# Patient Record
Sex: FEMALE | Race: WHITE | NOT HISPANIC OR LATINO | Employment: FULL TIME
[De-identification: names, ages, dates, MRNs, and addresses within clinical notes are randomized per-mention and may not be internally consistent; named-entity substitution may affect disease eponyms.]

---

## 2017-04-12 ENCOUNTER — RECORDS - HEALTHEAST (OUTPATIENT)
Dept: ADMINISTRATIVE | Facility: OTHER | Age: 57
End: 2017-04-12

## 2017-04-19 ENCOUNTER — HOSPITAL ENCOUNTER (OUTPATIENT)
Dept: ULTRASOUND IMAGING | Facility: CLINIC | Age: 57
Discharge: HOME | End: 2017-04-19

## 2017-04-19 DIAGNOSIS — N28.1 RENAL CYST: ICD-10-CM

## 2017-08-21 ENCOUNTER — HOSPITAL ENCOUNTER (OUTPATIENT)
Facility: CLINIC | Age: 57
End: 2017-08-21
Attending: OPHTHALMOLOGY | Admitting: OPHTHALMOLOGY
Payer: COMMERCIAL

## 2017-12-01 ENCOUNTER — ANESTHESIA EVENT (OUTPATIENT)
Dept: SURGERY | Facility: CLINIC | Age: 57
End: 2017-12-01
Payer: COMMERCIAL

## 2017-12-01 ENCOUNTER — SURGERY (OUTPATIENT)
Age: 57
End: 2017-12-01

## 2017-12-01 ENCOUNTER — ANESTHESIA (OUTPATIENT)
Dept: SURGERY | Facility: CLINIC | Age: 57
End: 2017-12-01
Payer: COMMERCIAL

## 2017-12-01 ENCOUNTER — HOSPITAL ENCOUNTER (OUTPATIENT)
Facility: CLINIC | Age: 57
Discharge: HOME | End: 2017-12-01
Attending: OPHTHALMOLOGY | Admitting: OPHTHALMOLOGY
Payer: COMMERCIAL

## 2017-12-01 VITALS
DIASTOLIC BLOOD PRESSURE: 87 MMHG | WEIGHT: 143 LBS | TEMPERATURE: 97.5 F | OXYGEN SATURATION: 96 % | HEIGHT: 63 IN | RESPIRATION RATE: 14 BRPM | SYSTOLIC BLOOD PRESSURE: 124 MMHG | BODY MASS INDEX: 25.34 KG/M2

## 2017-12-01 PROCEDURE — 25000125 ZZHC RX 250: Performed by: ANESTHESIOLOGY

## 2017-12-01 PROCEDURE — 25000128 H RX IP 250 OP 636: Performed by: NURSE ANESTHETIST, CERTIFIED REGISTERED

## 2017-12-01 PROCEDURE — 40000170 ZZH STATISTIC PRE-PROCEDURE ASSESSMENT II: Performed by: OPHTHALMOLOGY

## 2017-12-01 PROCEDURE — 71000028 ZZH EYE RECOVERY PHASE 2 EACH 15 MINS: Performed by: OPHTHALMOLOGY

## 2017-12-01 PROCEDURE — 71000027 ZZH RECOVERY PHASE 2 EACH 15 MINS: Performed by: OPHTHALMOLOGY

## 2017-12-01 PROCEDURE — 36000056 ZZH SURGERY LEVEL 3 1ST 30 MIN: Performed by: OPHTHALMOLOGY

## 2017-12-01 PROCEDURE — 25000125 ZZHC RX 250: Performed by: NURSE ANESTHETIST, CERTIFIED REGISTERED

## 2017-12-01 PROCEDURE — 37000009 ZZH ANESTHESIA TECHNICAL FEE, EACH ADDTL 15 MIN: Performed by: OPHTHALMOLOGY

## 2017-12-01 PROCEDURE — 37000008 ZZH ANESTHESIA TECHNICAL FEE, 1ST 30 MIN: Performed by: OPHTHALMOLOGY

## 2017-12-01 PROCEDURE — 25000128 H RX IP 250 OP 636: Performed by: ANESTHESIOLOGY

## 2017-12-01 PROCEDURE — 25000125 ZZHC RX 250: Performed by: OPHTHALMOLOGY

## 2017-12-01 PROCEDURE — 36000101 ZZH EYE SURGERY LEVEL 3 1ST 30 MIN: Performed by: OPHTHALMOLOGY

## 2017-12-01 PROCEDURE — 36000102 ZZH EYE SURGERY LEVEL 3 EA 15 ADDTL MIN: Performed by: OPHTHALMOLOGY

## 2017-12-01 PROCEDURE — 25000132 ZZH RX MED GY IP 250 OP 250 PS 637: Performed by: OPHTHALMOLOGY

## 2017-12-01 PROCEDURE — 36000058 ZZH SURGERY LEVEL 3 EA 15 ADDTL MIN: Performed by: OPHTHALMOLOGY

## 2017-12-01 PROCEDURE — 27210794 ZZH OR GENERAL SUPPLY STERILE: Performed by: OPHTHALMOLOGY

## 2017-12-01 RX ORDER — LIDOCAINE HCL/EPINEPHRINE/PF 2%-1:200K
VIAL (ML) INJECTION PRN
Status: DISCONTINUED | OUTPATIENT
Start: 2017-12-01 | End: 2017-12-01 | Stop reason: HOSPADM

## 2017-12-01 RX ORDER — FENTANYL CITRATE 50 UG/ML
INJECTION, SOLUTION INTRAMUSCULAR; INTRAVENOUS PRN
Status: DISCONTINUED | OUTPATIENT
Start: 2017-12-01 | End: 2017-12-01

## 2017-12-01 RX ORDER — ONDANSETRON 2 MG/ML
INJECTION INTRAMUSCULAR; INTRAVENOUS PRN
Status: DISCONTINUED | OUTPATIENT
Start: 2017-12-01 | End: 2017-12-01

## 2017-12-01 RX ORDER — SODIUM CHLORIDE, SODIUM LACTATE, POTASSIUM CHLORIDE, CALCIUM CHLORIDE 600; 310; 30; 20 MG/100ML; MG/100ML; MG/100ML; MG/100ML
INJECTION, SOLUTION INTRAVENOUS CONTINUOUS PRN
Status: DISCONTINUED | OUTPATIENT
Start: 2017-12-01 | End: 2017-12-01

## 2017-12-01 RX ORDER — ACETAMINOPHEN 500 MG
1000 TABLET ORAL ONCE
Status: COMPLETED | OUTPATIENT
Start: 2017-12-01 | End: 2017-12-01

## 2017-12-01 RX ORDER — PROPOFOL 10 MG/ML
INJECTION, EMULSION INTRAVENOUS PRN
Status: DISCONTINUED | OUTPATIENT
Start: 2017-12-01 | End: 2017-12-01

## 2017-12-01 RX ORDER — DEXAMETHASONE SODIUM PHOSPHATE 4 MG/ML
INJECTION, SOLUTION INTRA-ARTICULAR; INTRALESIONAL; INTRAMUSCULAR; INTRAVENOUS; SOFT TISSUE PRN
Status: DISCONTINUED | OUTPATIENT
Start: 2017-12-01 | End: 2017-12-01

## 2017-12-01 RX ORDER — SODIUM CHLORIDE, SODIUM LACTATE, POTASSIUM CHLORIDE, CALCIUM CHLORIDE 600; 310; 30; 20 MG/100ML; MG/100ML; MG/100ML; MG/100ML
INJECTION, SOLUTION INTRAVENOUS CONTINUOUS
Status: DISCONTINUED | OUTPATIENT
Start: 2017-12-01 | End: 2017-12-01 | Stop reason: HOSPADM

## 2017-12-01 RX ORDER — ERYTHROMYCIN 5 MG/G
OINTMENT OPHTHALMIC PRN
Status: DISCONTINUED | OUTPATIENT
Start: 2017-12-01 | End: 2017-12-01 | Stop reason: HOSPADM

## 2017-12-01 RX ADMIN — SODIUM CHLORIDE, POTASSIUM CHLORIDE, SODIUM LACTATE AND CALCIUM CHLORIDE: 600; 310; 30; 20 INJECTION, SOLUTION INTRAVENOUS at 08:45

## 2017-12-01 RX ADMIN — LIDOCAINE HYDROCHLORIDE 1 ML: 10 INJECTION, SOLUTION EPIDURAL; INFILTRATION; INTRACAUDAL; PERINEURAL at 08:36

## 2017-12-01 RX ADMIN — DEXMEDETOMIDINE 4 MCG: 100 INJECTION, SOLUTION, CONCENTRATE INTRAVENOUS at 09:20

## 2017-12-01 RX ADMIN — DEXMEDETOMIDINE 8 MCG: 100 INJECTION, SOLUTION, CONCENTRATE INTRAVENOUS at 09:08

## 2017-12-01 RX ADMIN — MIDAZOLAM HYDROCHLORIDE 1 MG: 1 INJECTION, SOLUTION INTRAMUSCULAR; INTRAVENOUS at 09:00

## 2017-12-01 RX ADMIN — FENTANYL CITRATE 50 MCG: 50 INJECTION, SOLUTION INTRAMUSCULAR; INTRAVENOUS at 09:01

## 2017-12-01 RX ADMIN — LIDOCAINE HYDROCHLORIDE,EPINEPHRINE BITARTRATE 6 ML: 20; .005 INJECTION, SOLUTION EPIDURAL; INFILTRATION; INTRACAUDAL; PERINEURAL at 10:12

## 2017-12-01 RX ADMIN — ACETAMINOPHEN 1000 MG: 500 TABLET, FILM COATED ORAL at 10:30

## 2017-12-01 RX ADMIN — DEXAMETHASONE SODIUM PHOSPHATE 4 MG: 4 INJECTION, SOLUTION INTRA-ARTICULAR; INTRALESIONAL; INTRAMUSCULAR; INTRAVENOUS; SOFT TISSUE at 09:16

## 2017-12-01 RX ADMIN — DEXMEDETOMIDINE 4 MCG: 100 INJECTION, SOLUTION, CONCENTRATE INTRAVENOUS at 09:25

## 2017-12-01 RX ADMIN — MIDAZOLAM HYDROCHLORIDE 0.5 MG: 1 INJECTION, SOLUTION INTRAMUSCULAR; INTRAVENOUS at 09:33

## 2017-12-01 RX ADMIN — ERYTHROMYCIN 2 G: 5 OINTMENT OPHTHALMIC at 10:05

## 2017-12-01 RX ADMIN — ONDANSETRON 4 MG: 2 INJECTION INTRAMUSCULAR; INTRAVENOUS at 09:16

## 2017-12-01 RX ADMIN — SODIUM CHLORIDE, POTASSIUM CHLORIDE, SODIUM LACTATE AND CALCIUM CHLORIDE: 600; 310; 30; 20 INJECTION, SOLUTION INTRAVENOUS at 08:36

## 2017-12-01 RX ADMIN — MIDAZOLAM HYDROCHLORIDE 0.5 MG: 1 INJECTION, SOLUTION INTRAMUSCULAR; INTRAVENOUS at 09:05

## 2017-12-01 RX ADMIN — LIDOCAINE HYDROCHLORIDE,EPINEPHRINE BITARTRATE 4 ML: 20; .005 INJECTION, SOLUTION EPIDURAL; INFILTRATION; INTRACAUDAL; PERINEURAL at 09:14

## 2017-12-01 RX ADMIN — PROPOFOL 50 MG: 10 INJECTION, EMULSION INTRAVENOUS at 09:01

## 2017-12-01 ASSESSMENT — LIFESTYLE VARIABLES: TOBACCO_USE: 0

## 2017-12-01 ASSESSMENT — COPD QUESTIONNAIRES: COPD: 0

## 2017-12-01 NOTE — ANESTHESIA POSTPROCEDURE EVALUATION
Patient: Selam Rene    Procedure(s):  BILATERAL UPPER LID PTOSIS AND MECHANICAL PTOSIS REPAIR - Wound Class: I-Clean    Diagnosis:BILATERAL UPPER LID PTOSIS AND MECHANICAL PTOSIS  Diagnosis Additional Information: No value filed.    Anesthesia Type:  MAC    Note:  Anesthesia Post Evaluation    Patient location during evaluation: PACU  Patient participation: Able to fully participate in evaluation  Level of consciousness: awake  Pain management: adequate  Airway patency: patent  Cardiovascular status: acceptable  Respiratory status: acceptable  Hydration status: acceptable  PONV: none     Anesthetic complications: None          Last vitals:  Vitals:    12/01/17 0824 12/01/17 1015 12/01/17 1034   BP: (!) 119/94 (!) 125/95 124/87   Resp: 16 14 14   Temp: 36.4  C (97.5  F)     SpO2: 94% 95% 96%         Electronically Signed By: Johanne Tripp MD  December 1, 2017  1:13 PM

## 2017-12-01 NOTE — DISCHARGE INSTRUCTIONS
[facility] Discharge  Instructions  Anesthesia ([facility] Care Center)   Adult Discharge Instructions    For 24 hours after surgery    1. Get plenty of rest.  Make arrangements to have a responsible adult stay with you for at least 6 hours after you leave the hospital.  2. Do not drive or use heavy equipment for 24 hours.    3. Do not drink alcohol for 24 hours.  4. Do not sign legal documents or make important decisions for 24 hours.  5. Avoid strenuous or risky activities. You may feel lightheaded.  If so, sit for a few minutes before standing.  Have someone help you get up.   6. Conscious sedation patients may resume a regular diet..  7. Any questions of medical nature, call your physician.    [facility]   Eyelid/Orbital Surgery Discharge Instructions    Lawrence West M.D..     ICE COMPRESSES  Immediately following surgery, you should begin to apply ice compresses.  Apply a cold gel pack or wrap a clean washcloth around a cup of crushed ice in a plastic bag (a bag of frozen peas also works well) and hold the cold compresses directly against the closed eyelid (s).Apply cold pack for a minimum of six times daily for no longer than 15 minutes at a time. Continue cold compresses every day until the bruising and swelling begin to subside.  This can vary for each patient, but three 3 days may be common.    HOT COMPRESSES  After your swelling and bruising have begun to subside, hot compresses should be applied.  Take a clean washcloth and wring it out in hot water (as warm as you can tolerate comfortably).  Hold this warm compress against the closed eyelid(s) at least six times per day for 15 minutes.  This should be continued for about two weeks.    OINTMENT  You may be given some ointment when you leave the hospital.  Apply this ointment to the suture line(s) twice a day, 1/4 inch into the eye at bedtime for 7 days.  Expect some blurring of vision from the ointment.      ACTIVITY  Avoid heavy lifting or vigorous exercise for one week after surgery.  You may resume regular activities as tolerated.  You may shower and wash your hair on the day after surgery; be careful to avoid getting shampoo in your eyes. While your eyes are still swelling, it is recommended you sleep on your back and elevate your head with 2-3 pillows.    MEDICATION  If the doctor has given you some medications to take after surgery, please take these according to the instructions on the bottle.  Pain medications may make you drowsy so do not drive, operate heavy machinery, or use alcohol while taking it.  When you feel that you do not need the prescription pain medication, you may substitute Extra Strength Tylenol for mild pain by also following the directions on the bottle.    If you were taking Coumadin (warfarin) prior to your surgery, you may resume this medication with your next scheduled dose.    WHAT TO EXPECT  You should expect some slight oozing of blood from the incision site over the first two to three days after surgery.  Swelling and bruising will occur for one to two weeks or longer.  You may also experience itching and tearing during the first several weeks after surgery.  This is part of the normal healing process    QUESTIONS  Please feel free to contact the office, should you have any questions that are not answered above.  The phone number is [phone].  Please call immediately if you are unable to establish vision in the operative eye, you are experiencing heavy bleeding that will not stop with gentle pressure or you have any signs of an infection (greenish/yellow discharge or progressive redness).    [facility]  [place] [address]  [place], [place] [address]  [phone]    While you were at the hospital today you were given 1000 mg of Tylenol at 10:30 today. The recommended daily maximum dose is 4000 mg in  24 hours.

## 2017-12-01 NOTE — ANESTHESIA PREPROCEDURE EVALUATION
Anesthesia Evaluation     . Pt has had prior anesthetic.     No history of anesthetic complications          ROS/MED HX    ENT/Pulmonary:     (+)asthma , . .   (-) tobacco use, COPD and sleep apnea   Neurologic:       Cardiovascular:     (+) hypertension----. : . . . :. .      (-) CAD   METS/Exercise Tolerance:     Hematologic:         Musculoskeletal:         GI/Hepatic:     (+) GERD      (-) liver disease   Renal/Genitourinary:      (-) renal disease   Endo:      (-) Type I DM and Type II DM   Psychiatric:         Infectious Disease:         Malignancy:         Other:                     Physical Exam  Normal systems: cardiovascular, pulmonary and dental    Airway   Mallampati: II  TM distance: >3 FB  Neck ROM: full    Dental     Cardiovascular       Pulmonary                     Anesthesia Plan      History & Physical Review  History and physical reviewed and following examination; no interval change.    ASA Status:  2 .    NPO Status:  > 8 hours    Plan for MAC Reason for MAC:  Procedure to face, neck, head or breast  PONV prophylaxis:  Ondansetron (or other 5HT-3)       Postoperative Care  Postoperative pain management:  IV analgesics.      Consents  Anesthetic plan, risks, benefits and alternatives discussed with:  Patient..                          .

## 2017-12-01 NOTE — IP AVS SNAPSHOT
[facility] [place] [facility]    [address]    [place] [address]    Phone:  [phone]    Fax:  [phone]                                       After Visit Summary   12/1/2017    Selam Rene    MRN: [id number]           After Visit Summary Signature Page     I have received my discharge instructions, and my questions have been answered. I have discussed any challenges I see with this plan with the nurse or doctor.    ..........................................................................................................................................  Patient/Patient Representative Signature      ..........................................................................................................................................  Patient Representative Print Name and Relationship to Patient    ..................................................               ................................................  Date                                            Time    ..........................................................................................................................................  Reviewed by Signature/Title    ...................................................              ..............................................  Date                                                            Time

## 2017-12-01 NOTE — IP AVS SNAPSHOT
MRN:[id number]                      After Visit Summary   12/1/2017    Selam Rene    MRN: [id number]           Thank you!     Thank you for choosing [place] for your care. Our goal is always to provide you with excellent care. Hearing back from our patients is one way we can continue to improve our services. Please take a few minutes to complete the written survey that you may receive in the mail after you visit with us. Thank you!        Patient Information     Date Of Birth          1960        About your hospital stay     You were admitted on:  December 1, 2017 You last received care in the:  [facility]    You were discharged on:  December 1, 2017       Who to Call     For medical emergencies, please call 911.  For non-urgent questions about your medical care, please call your primary care provider or clinic, [phone]  For questions related to your surgery, please call your surgery clinic        Attending Provider     Provider Specialty    Lawrence West MD Ophthalmology       Primary Care Provider Office Phone # Fax #    Jennifer SELF Sita [phone]      After Care Instructions     Discharge Medication Instructions       Do NOT take aspirin or medications containing NSAIDS for 72 hours after procedure.            Ice to affected area       Apply cold pack for 15 minutes on, 15 minutes off, for 48 hours while awake.                  Further instructions from your care team       [facility] Discharge  Instructions  Anesthesia ([facility])   Adult Discharge Instructions    For 24 hours after surgery    1. Get plenty of rest.  Make arrangements to have a responsible adult stay with you for at least 6 hours after you leave the hospital.  2. Do not drive or use heavy equipment for 24 hours.    3. Do not drink alcohol for 24 hours.  4. Do not sign legal documents or make important decisions for 24  hours.  5. Avoid strenuous or risky activities. You may feel lightheaded.  If so, sit for a few minutes before standing.  Have someone help you get up.   6. Conscious sedation patients may resume a regular diet..  7. Any questions of medical nature, call your physician.    [facility]   Eyelid/Orbital Surgery Discharge Instructions    Lawrecne West M.D..     ICE COMPRESSES  Immediately following surgery, you should begin to apply ice compresses.  Apply a cold gel pack or wrap a clean washcloth around a cup of crushed ice in a plastic bag (a bag of frozen peas also works well) and hold the cold compresses directly against the closed eyelid (s).Apply cold pack for a minimum of six times daily for no longer than 15 minutes at a time. Continue cold compresses every day until the bruising and swelling begin to subside.  This can vary for each patient, but three 3 days may be common.    HOT COMPRESSES  After your swelling and bruising have begun to subside, hot compresses should be applied.  Take a clean washcloth and wring it out in hot water (as warm as you can tolerate comfortably).  Hold this warm compress against the closed eyelid(s) at least six times per day for 15 minutes.  This should be continued for about two weeks.    OINTMENT  You may be given some ointment when you leave the hospital.  Apply this ointment to the suture line(s) twice a day, 1/4 inch into the eye at bedtime for 7 days.  Expect some blurring of vision from the ointment.     ACTIVITY  Avoid heavy lifting or vigorous exercise for one week after surgery.  You may resume regular activities as tolerated.  You may shower and wash your hair on the day after surgery; be careful to avoid getting shampoo in your eyes. While your eyes are still swelling, it is recommended you sleep on your back and elevate your head with 2-3 pillows.    MEDICATION  If the doctor has given you some medications to take after surgery, please take these  "according to the instructions on the bottle.  Pain medications may make you drowsy so do not drive, operate heavy machinery, or use alcohol while taking it.  When you feel that you do not need the prescription pain medication, you may substitute Extra Strength Tylenol for mild pain by also following the directions on the bottle.    If you were taking Coumadin (warfarin) prior to your surgery, you may resume this medication with your next scheduled dose.    WHAT TO EXPECT  You should expect some slight oozing of blood from the incision site over the first two to three days after surgery.  Swelling and bruising will occur for one to two weeks or longer.  You may also experience itching and tearing during the first several weeks after surgery.  This is part of the normal healing process    QUESTIONS  Please feel free to contact the office, should you have any questions that are not answered above.  The phone number is [phone].  Please call immediately if you are unable to establish vision in the operative eye, you are experiencing heavy bleeding that will not stop with gentle pressure or you have any signs of an infection (greenish/yellow discharge or progressive redness).    [facility]  [facility] [place] [address]  [place], [place] [address]  [phone]    While you were at the hospital today you were given 1000 mg of Tylenol at 10:30 today. The recommended daily maximum dose is 4000 mg in 24 hours.     Pending Results     No orders found from 11/29/2017 to 12/2/2017.            Admission Information     Date & Time Provider Department Dept. Phone    12/1/2017 Lawrence West MD [facility] [phone]      Your Vitals Were     Blood Pressure Temperature Respirations Height Weight Pulse Oximetry    124/87 97.5  F (36.4  C) (Temporal) 14 1.6 m (5' 3\") 64.9 kg (143 lb) 96%    BMI (Body Mass Index)                   " "25.33 kg/m2           [employer] Information     [employer] lets you send messages to your doctor, view your test results, renew your prescriptions, schedule appointments and more. To sign up, go to [email or web address] . Click on \"Log in\" on the left side of the screen, which will take you to the Welcome page. Then click on \"Sign up Now\" on the right side of the page.     You will be asked to enter the access code listed below, as well as some personal information. Please follow the directions to create your username and password.     Your access code is: [email or web address]  Expires: 3/1/2018 10:27 AM     Your access code will  in 90 days. If you need help or a new code, please call your [place] clinic or [phone].        Care EveryWhere ID     This is your Care EveryWhere ID. This could be used by other organizations to access your [place] medical records  [id number]        Equal Access to Services     MARIA ISABEL GIBBONS : Hadii aad ku hadasho Sofuadali, waaxda luqadaha, qaybta kaalmada adeegyagraeme, ruben edgar . So [facility] [phone].    ATENCIÓN: Si habla español, tiene a navarro disposición servicios gratuitos de asistencia lingüística. Llame al [phone].    We comply with applicable federal civil rights laws and [place] laws. We do not discriminate on the basis of race, color, national origin, age, disability, sex, sexual orientation, or gender identity.               Review of your medicines      CONTINUE these medicines which have NOT CHANGED        Dose / Directions    ADVAIR DISKUS 250-50 MCG/DOSE diskus inhaler   Used for:  Mild intermittent asthma   Generic drug:  fluticasone-salmeterol        INHALE ONE PUFF TWICE DAILY   Quantity:  1   Refills:  1       ALBUTEROL IN        prn   Refills:  0       ammonium lactate 12 % cream   Commonly known as:  LAC-HYDRIN   Used for:  Dry skin        Apply topically 2 times daily as needed for dry skin   Quantity:  385 g   Refills:  3       " ARIMIDEX 1 MG tablet   Generic drug:  anastrozole        1 TABLET DAILY   Refills:  0       MACROBID 100 MG capsule   Used for:  Urinary tract infection, site not specified   Generic drug:  nitroFURantoin (macrocrystal-monohydrate)        ONE CAPSULE TWICE DAILY   Quantity:  14   Refills:  0       MIRAPEX PO        1 TABLET 3 TIMES DAILY   Refills:  0       MOBIC PO        1 TABLET DAILY   Refills:  0       predniSONE 20 MG tablet   Commonly known as:  DELTASONE   Used for:  Dysuria        3 tabs daily for 3 days than 2 tabs for 3 days than 1 tab for 3 days   Quantity:  qs   Refills:  0       PROAIR  (90 BASE) MCG/ACT Inhaler   Used for:  Mild intermittent asthma   Generic drug:  albuterol        INHALE 1 TO 2 PUFFS EVERY 4 TO 6 HOURS AS NEEDED   Quantity:  1   Refills:  1 YEAR       PROTONIX PO        1 TABLET DAILY   Refills:  0                Protect others around you: Learn how to safely use, store and throw away your medicines at www.disposemymeds.org.             Medication List: This is a list of all your medications and when to take them. Check marks below indicate your daily home schedule. Keep this list as a reference.      Medications           Morning Afternoon Evening Bedtime As Needed    ADVAIR DISKUS 250-50 MCG/DOSE diskus inhaler   INHALE ONE PUFF TWICE DAILY   Generic drug:  fluticasone-salmeterol                                ALBUTEROL IN   prn                                ammonium lactate 12 % cream   Commonly known as:  LAC-HYDRIN   Apply topically 2 times daily as needed for dry skin                                ARIMIDEX 1 MG tablet   1 TABLET DAILY   Generic drug:  anastrozole                                MACROBID 100 MG capsule   ONE CAPSULE TWICE DAILY   Generic drug:  nitroFURantoin (macrocrystal-monohydrate)                                MIRAPEX PO   1 TABLET 3 TIMES DAILY                                MOBIC PO   1 TABLET DAILY                                predniSONE 20 MG  tablet   Commonly known as:  DELTASONE   3 tabs daily for 3 days than 2 tabs for 3 days than 1 tab for 3 days                                PROAIR  (90 BASE) MCG/ACT Inhaler   INHALE 1 TO 2 PUFFS EVERY 4 TO 6 HOURS AS NEEDED   Generic drug:  albuterol                                PROTONIX PO   1 TABLET DAILY

## 2017-12-01 NOTE — ANESTHESIA CARE TRANSFER NOTE
Patient: Selam Rene    Procedure(s):  BILATERAL UPPER LID PTOSIS AND MECHANICAL PTOSIS REPAIR - Wound Class: I-Clean    Diagnosis: BILATERAL UPPER LID PTOSIS AND MECHANICAL PTOSIS  Diagnosis Additional Information: No value filed.    Anesthesia Type:   MAC     Note:  Airway :Room Air  Patient transferred to:PACU  Comments: Patient awake.   Vital signs stable. Patient transferred to recovery.  IV patent X 1.  Vitals in PACU:  HR 76  RR 17  Sats 98%    Handoff Report: Identifed the Patient, Identified the Reponsible Provider, Reviewed the pertinent medical history, Discussed the surgical course, Reviewed Intra-OP anesthesia mangement and issues during anesthesia, Set expectations for post-procedure period and Allowed opportunity for questions and acknowledgement of understanding      Vitals: (Last set prior to Anesthesia Care Transfer)    CRNA VITALS  12/1/2017 0935 - 12/1/2017 1008      12/1/2017             Pulse: 78    Ht Rate: 77    SpO2: 95 %    Resp Rate (set): 10                Electronically Signed By: APPLE Dalton CRNA  December 1, 2017  10:08 AM

## 2017-12-01 NOTE — OP NOTE
Preoperative Diagnosis: Bilateral Upper Eyelid Ptosis  Post Operative Diagnosis: Bilateral Upper Eyelid Ptosis and Mechanical Ptosis   Operation: Bilateral Upper Lid Ptosis repair and Mechanical Ptosis repair    Anesthesia: Local Monitored    Complications: None    Indications for surgery: Patient has bilateral upper lid ptosis,obstructing the vision and interfering with daily activities. Patient also has excessive skin overhanging the upper   eyelids contributing the visual obstruction.    Procedure: The patient was taken to the operating room and the upper eyelid creases were marked with a marking pen. The upper eyelids were injected with 2% Lidocaine  along both upper eyelid creases. The patient was prepped and draped in the usual sterile fashion. The planned skin incision was outlined with a fine tipped marking pen.  The incision was then made along the previously marked area. A moderate amount of skin was excised taking care to allow adequate closure and avoid lagophthalmos. Meryl scissors were then used to develop a plane over the septum. The septum was opened and a small amount of orbital fat was removed. Levator aponeurosis was then disinserted from the tarsus and a plane was developed between th aponeurosis and the underlying tarsus and hobson's muscle. A 5-0 Mersilene suture was then passed through the tarsus in a lamellar fashion and externalized through the levator aponeurosis. This was tied off in a temporary fashion and the patient was asked to sit up and the eyelids height and contour evaluated. This was repeated until a satisfactory height and contour were obtained,and two additional sutures were placed lateral to the initial suture.This resulted in a normal contour and height to the upper eyelids. Attempted overcorrection of 0.5mm was obtained. The redundant levator aponeurosis was then excised and the skin was then closed with running 6-0 fast absorbing suture. The patient left the operating  room in stable condition.

## 2018-08-23 ENCOUNTER — RECORDS - HEALTHEAST (OUTPATIENT)
Dept: LAB | Facility: HOSPITAL | Age: 58
End: 2018-08-23

## 2018-08-23 LAB — HBV SURFACE AB SERPL IA-ACNC: POSITIVE M[IU]/ML

## 2018-08-24 LAB
MEV IGG SER IA-ACNC: POSITIVE
MUV IGG SER QL IA: POSITIVE
RUBV IGG SERPL QL IA: POSITIVE
VZV IGG SER QL IA: POSITIVE

## 2018-08-25 LAB
GAMMA INTERFERON BACKGROUND BLD IA-ACNC: 0.1 IU/ML
M TB IFN-G BLD-IMP: NEGATIVE
MITOGEN IGNF BCKGRD COR BLD-ACNC: -0.01 IU/ML
MITOGEN IGNF BCKGRD COR BLD-ACNC: 0.01 IU/ML
QTF INTERPRETATION: NORMAL
QTF MITOGEN - NIL: 9.42 IU/ML

## 2018-11-15 ENCOUNTER — RECORDS - HEALTHEAST (OUTPATIENT)
Dept: LAB | Facility: CLINIC | Age: 58
End: 2018-11-15

## 2018-11-16 LAB — BACTERIA SPEC CULT: NO GROWTH

## 2018-12-31 ENCOUNTER — OFFICE VISIT - HEALTHEAST (OUTPATIENT)
Dept: FAMILY MEDICINE | Facility: CLINIC | Age: 58
End: 2018-12-31

## 2018-12-31 DIAGNOSIS — R05.9 COUGH: ICD-10-CM

## 2019-01-18 ENCOUNTER — OFFICE VISIT - HEALTHEAST (OUTPATIENT)
Dept: FAMILY MEDICINE | Facility: CLINIC | Age: 59
End: 2019-01-18

## 2019-01-18 DIAGNOSIS — G43.919 INTRACTABLE MIGRAINE WITHOUT STATUS MIGRAINOSUS, UNSPECIFIED MIGRAINE TYPE: ICD-10-CM

## 2019-01-18 DIAGNOSIS — G43.A0 CYCLIC VOMITING SYNDROME, INTRACTABILITY OF VOMITING NOT SPECIFIED, PRESENCE OF NAUSEA NOT SPECIFIED: ICD-10-CM

## 2019-01-18 DIAGNOSIS — K21.9 GASTROESOPHAGEAL REFLUX DISEASE WITHOUT ESOPHAGITIS: ICD-10-CM

## 2019-01-18 DIAGNOSIS — N39.0 CHRONIC UTI: ICD-10-CM

## 2019-02-01 ENCOUNTER — RECORDS - HEALTHEAST (OUTPATIENT)
Dept: ADMINISTRATIVE | Facility: OTHER | Age: 59
End: 2019-02-01

## 2019-02-04 ENCOUNTER — RECORDS - HEALTHEAST (OUTPATIENT)
Dept: ADMINISTRATIVE | Facility: OTHER | Age: 59
End: 2019-02-04

## 2019-02-08 ENCOUNTER — OFFICE VISIT - HEALTHEAST (OUTPATIENT)
Dept: FAMILY MEDICINE | Facility: CLINIC | Age: 59
End: 2019-02-08

## 2019-02-08 DIAGNOSIS — G43.A0 CYCLICAL VOMITING WITHOUT NAUSEA, INTRACTABILITY OF VOMITING NOT SPECIFIED: ICD-10-CM

## 2019-02-08 DIAGNOSIS — Z01.818 PRE-OP EXAM: ICD-10-CM

## 2019-02-08 LAB
ALBUMIN SERPL-MCNC: 3.8 G/DL (ref 3.5–5)
ALP SERPL-CCNC: 83 U/L (ref 45–120)
ALT SERPL W P-5'-P-CCNC: 9 U/L (ref 0–45)
ANION GAP SERPL CALCULATED.3IONS-SCNC: 7 MMOL/L (ref 5–18)
AST SERPL W P-5'-P-CCNC: 18 U/L (ref 0–40)
ATRIAL RATE - MUSE: 79 BPM
BILIRUB SERPL-MCNC: 0.6 MG/DL (ref 0–1)
BUN SERPL-MCNC: 14 MG/DL (ref 8–22)
CALCIUM SERPL-MCNC: 10.2 MG/DL (ref 8.5–10.5)
CHLORIDE BLD-SCNC: 104 MMOL/L (ref 98–107)
CO2 SERPL-SCNC: 29 MMOL/L (ref 22–31)
CREAT SERPL-MCNC: 0.64 MG/DL (ref 0.6–1.1)
DIASTOLIC BLOOD PRESSURE - MUSE: NORMAL MMHG
ERYTHROCYTE [DISTWIDTH] IN BLOOD BY AUTOMATED COUNT: 11.4 % (ref 11–14.5)
GFR SERPL CREATININE-BSD FRML MDRD: >60 ML/MIN/1.73M2
GLUCOSE BLD-MCNC: 84 MG/DL (ref 70–125)
HCT VFR BLD AUTO: 42.1 % (ref 35–47)
HGB BLD-MCNC: 14.3 G/DL (ref 12–16)
INTERPRETATION ECG - MUSE: NORMAL
MCH RBC QN AUTO: 31.3 PG (ref 27–34)
MCHC RBC AUTO-ENTMCNC: 34.1 G/DL (ref 32–36)
MCV RBC AUTO: 92 FL (ref 80–100)
P AXIS - MUSE: 63 DEGREES
PLATELET # BLD AUTO: 313 THOU/UL (ref 140–440)
PMV BLD AUTO: 7.4 FL (ref 7–10)
POTASSIUM BLD-SCNC: 4.2 MMOL/L (ref 3.5–5)
PR INTERVAL - MUSE: 130 MS
PROT SERPL-MCNC: 7.4 G/DL (ref 6–8)
QRS DURATION - MUSE: 102 MS
QT - MUSE: 366 MS
QTC - MUSE: 419 MS
R AXIS - MUSE: 54 DEGREES
RBC # BLD AUTO: 4.59 MILL/UL (ref 3.8–5.4)
SODIUM SERPL-SCNC: 140 MMOL/L (ref 136–145)
SYSTOLIC BLOOD PRESSURE - MUSE: NORMAL MMHG
T AXIS - MUSE: 53 DEGREES
VENTRICULAR RATE- MUSE: 79 BPM
WBC: 7.6 THOU/UL (ref 4–11)

## 2019-02-08 ASSESSMENT — MIFFLIN-ST. JEOR: SCORE: 1216.34

## 2019-02-19 ENCOUNTER — COMMUNICATION - HEALTHEAST (OUTPATIENT)
Dept: FAMILY MEDICINE | Facility: CLINIC | Age: 59
End: 2019-02-19

## 2019-02-19 DIAGNOSIS — K21.9 GASTROESOPHAGEAL REFLUX DISEASE WITHOUT ESOPHAGITIS: ICD-10-CM

## 2019-04-25 ENCOUNTER — COMMUNICATION - HEALTHEAST (OUTPATIENT)
Dept: TELEHEALTH | Facility: CLINIC | Age: 59
End: 2019-04-25

## 2019-04-25 ENCOUNTER — OFFICE VISIT - HEALTHEAST (OUTPATIENT)
Dept: INTERNAL MEDICINE | Facility: CLINIC | Age: 59
End: 2019-04-25

## 2019-04-25 ENCOUNTER — COMMUNICATION - HEALTHEAST (OUTPATIENT)
Dept: INTERNAL MEDICINE | Facility: CLINIC | Age: 59
End: 2019-04-25

## 2019-04-25 DIAGNOSIS — Z85.3 HISTORY OF BREAST CANCER: ICD-10-CM

## 2019-04-25 DIAGNOSIS — R59.9 ENLARGED LYMPH NODES: ICD-10-CM

## 2019-04-25 ASSESSMENT — MIFFLIN-ST. JEOR: SCORE: 1187.31

## 2019-05-02 ENCOUNTER — COMMUNICATION - HEALTHEAST (OUTPATIENT)
Dept: SCHEDULING | Facility: CLINIC | Age: 59
End: 2019-05-02

## 2019-05-03 ENCOUNTER — OFFICE VISIT - HEALTHEAST (OUTPATIENT)
Dept: FAMILY MEDICINE | Facility: CLINIC | Age: 59
End: 2019-05-03

## 2019-05-03 DIAGNOSIS — S39.012A BACK STRAIN, INITIAL ENCOUNTER: ICD-10-CM

## 2019-05-09 ENCOUNTER — COMMUNICATION - HEALTHEAST (OUTPATIENT)
Dept: FAMILY MEDICINE | Facility: CLINIC | Age: 59
End: 2019-05-09

## 2019-06-01 ENCOUNTER — COMMUNICATION - HEALTHEAST (OUTPATIENT)
Dept: FAMILY MEDICINE | Facility: CLINIC | Age: 59
End: 2019-06-01

## 2019-06-01 DIAGNOSIS — K21.9 GASTROESOPHAGEAL REFLUX DISEASE WITHOUT ESOPHAGITIS: ICD-10-CM

## 2019-06-07 ENCOUNTER — COMMUNICATION - HEALTHEAST (OUTPATIENT)
Dept: FAMILY MEDICINE | Facility: CLINIC | Age: 59
End: 2019-06-07

## 2019-06-07 ENCOUNTER — OFFICE VISIT - HEALTHEAST (OUTPATIENT)
Dept: FAMILY MEDICINE | Facility: CLINIC | Age: 59
End: 2019-06-07

## 2019-06-07 DIAGNOSIS — R30.0 DYSURIA: ICD-10-CM

## 2019-06-07 DIAGNOSIS — M48.062 SPINAL STENOSIS OF LUMBAR REGION WITH NEUROGENIC CLAUDICATION: ICD-10-CM

## 2019-06-07 DIAGNOSIS — G43.919 INTRACTABLE MIGRAINE WITHOUT STATUS MIGRAINOSUS, UNSPECIFIED MIGRAINE TYPE: ICD-10-CM

## 2019-06-07 LAB
ALBUMIN UR-MCNC: NEGATIVE MG/DL
AMORPH CRY #/AREA URNS HPF: ABNORMAL /[HPF]
APPEARANCE UR: ABNORMAL
BACTERIA #/AREA URNS HPF: ABNORMAL HPF
BILIRUB UR QL STRIP: NEGATIVE
COLOR UR AUTO: YELLOW
GLUCOSE UR STRIP-MCNC: NEGATIVE MG/DL
HGB UR QL STRIP: NEGATIVE
KETONES UR STRIP-MCNC: NEGATIVE MG/DL
LEUKOCYTE ESTERASE UR QL STRIP: ABNORMAL
NITRATE UR QL: NEGATIVE
PH UR STRIP: 7 [PH] (ref 5–8)
RBC #/AREA URNS AUTO: ABNORMAL HPF
SP GR UR STRIP: 1.01 (ref 1–1.03)
SQUAMOUS #/AREA URNS AUTO: ABNORMAL LPF
UROBILINOGEN UR STRIP-ACNC: ABNORMAL
WBC #/AREA URNS AUTO: ABNORMAL HPF

## 2019-06-08 LAB — BACTERIA SPEC CULT: NO GROWTH

## 2019-06-13 ENCOUNTER — COMMUNICATION - HEALTHEAST (OUTPATIENT)
Dept: FAMILY MEDICINE | Facility: CLINIC | Age: 59
End: 2019-06-13

## 2019-06-21 ENCOUNTER — OFFICE VISIT - HEALTHEAST (OUTPATIENT)
Dept: FAMILY MEDICINE | Facility: CLINIC | Age: 59
End: 2019-06-21

## 2019-06-21 DIAGNOSIS — R30.0 DYSURIA: ICD-10-CM

## 2019-06-21 DIAGNOSIS — M51.379 DDD (DEGENERATIVE DISC DISEASE), LUMBOSACRAL: ICD-10-CM

## 2019-06-21 DIAGNOSIS — C44.310 BCC (BASAL CELL CARCINOMA), FACE: ICD-10-CM

## 2019-06-21 LAB
ALBUMIN UR-MCNC: NEGATIVE MG/DL
APPEARANCE UR: CLEAR
BACTERIA #/AREA URNS HPF: ABNORMAL HPF
BILIRUB UR QL STRIP: NEGATIVE
COLOR UR AUTO: YELLOW
GLUCOSE UR STRIP-MCNC: NEGATIVE MG/DL
HGB UR QL STRIP: NEGATIVE
KETONES UR STRIP-MCNC: NEGATIVE MG/DL
LEUKOCYTE ESTERASE UR QL STRIP: ABNORMAL
NITRATE UR QL: NEGATIVE
PH UR STRIP: 6.5 [PH] (ref 5–8)
RBC #/AREA URNS AUTO: ABNORMAL HPF
SP GR UR STRIP: 1.02 (ref 1–1.03)
SQUAMOUS #/AREA URNS AUTO: ABNORMAL LPF
UROBILINOGEN UR STRIP-ACNC: ABNORMAL
WBC #/AREA URNS AUTO: ABNORMAL HPF

## 2019-06-21 ASSESSMENT — MIFFLIN-ST. JEOR: SCORE: 1190.48

## 2019-06-22 LAB — BACTERIA SPEC CULT: NO GROWTH

## 2019-06-24 ENCOUNTER — COMMUNICATION - HEALTHEAST (OUTPATIENT)
Dept: FAMILY MEDICINE | Facility: CLINIC | Age: 59
End: 2019-06-24

## 2019-07-01 ENCOUNTER — OFFICE VISIT - HEALTHEAST (OUTPATIENT)
Dept: FAMILY MEDICINE | Facility: CLINIC | Age: 59
End: 2019-07-01

## 2019-07-01 DIAGNOSIS — R30.0 DYSURIA: ICD-10-CM

## 2019-07-01 DIAGNOSIS — N30.00 ACUTE CYSTITIS WITHOUT HEMATURIA: ICD-10-CM

## 2019-07-01 LAB
ALBUMIN UR-MCNC: NEGATIVE MG/DL
APPEARANCE UR: CLEAR
BACTERIA #/AREA URNS HPF: ABNORMAL HPF
BILIRUB UR QL STRIP: NEGATIVE
COLOR UR AUTO: YELLOW
GLUCOSE UR STRIP-MCNC: NEGATIVE MG/DL
HGB UR QL STRIP: ABNORMAL
KETONES UR STRIP-MCNC: NEGATIVE MG/DL
LEUKOCYTE ESTERASE UR QL STRIP: ABNORMAL
NITRATE UR QL: NEGATIVE
PH UR STRIP: 6 [PH] (ref 5–8)
RBC #/AREA URNS AUTO: ABNORMAL HPF
SP GR UR STRIP: 1.02 (ref 1–1.03)
SQUAMOUS #/AREA URNS AUTO: ABNORMAL LPF
UROBILINOGEN UR STRIP-ACNC: ABNORMAL
WBC #/AREA URNS AUTO: ABNORMAL HPF

## 2019-07-03 LAB — BACTERIA SPEC CULT: ABNORMAL

## 2019-07-09 ENCOUNTER — OFFICE VISIT - HEALTHEAST (OUTPATIENT)
Dept: FAMILY MEDICINE | Facility: CLINIC | Age: 59
End: 2019-07-09

## 2019-07-09 DIAGNOSIS — K13.70 LESION OF MOUTH: ICD-10-CM

## 2019-07-09 DIAGNOSIS — J02.9 SORE THROAT: ICD-10-CM

## 2019-07-10 ENCOUNTER — COMMUNICATION - HEALTHEAST (OUTPATIENT)
Dept: LAB | Facility: CLINIC | Age: 59
End: 2019-07-10

## 2019-07-10 ENCOUNTER — HOSPITAL ENCOUNTER (OUTPATIENT)
Dept: CT IMAGING | Facility: HOSPITAL | Age: 59
Discharge: HOME | End: 2019-07-10
Attending: OTOLARYNGOLOGY

## 2019-07-10 ENCOUNTER — OFFICE VISIT - HEALTHEAST (OUTPATIENT)
Dept: OTOLARYNGOLOGY | Facility: CLINIC | Age: 59
End: 2019-07-10

## 2019-07-10 DIAGNOSIS — M27.2 MANDIBULAR ABSCESS: ICD-10-CM

## 2019-07-11 ENCOUNTER — COMMUNICATION - HEALTHEAST (OUTPATIENT)
Dept: ADMINISTRATIVE | Facility: CLINIC | Age: 59
End: 2019-07-11

## 2019-07-24 ENCOUNTER — SURGERY - HEALTHEAST (OUTPATIENT)
Dept: PODIATRY | Facility: CLINIC | Age: 59
End: 2019-07-24

## 2019-07-24 ENCOUNTER — RECORDS - HEALTHEAST (OUTPATIENT)
Dept: GENERAL RADIOLOGY | Facility: CLINIC | Age: 59
End: 2019-07-24

## 2019-07-24 ENCOUNTER — OFFICE VISIT - HEALTHEAST (OUTPATIENT)
Dept: PODIATRY | Facility: CLINIC | Age: 59
End: 2019-07-24

## 2019-07-24 DIAGNOSIS — L84 TYLOMA: ICD-10-CM

## 2019-07-24 DIAGNOSIS — M89.30 HYPERTROPHY OF BONE: ICD-10-CM

## 2019-07-24 DIAGNOSIS — M89.30 HYPERTROPHY OF BONE, UNSPECIFIED SITE: ICD-10-CM

## 2019-07-24 ASSESSMENT — MIFFLIN-ST. JEOR: SCORE: 1187.31

## 2019-07-25 ENCOUNTER — COMMUNICATION - HEALTHEAST (OUTPATIENT)
Dept: ADMINISTRATIVE | Facility: CLINIC | Age: 59
End: 2019-07-25

## 2019-08-02 ENCOUNTER — COMMUNICATION - HEALTHEAST (OUTPATIENT)
Dept: VASCULAR SURGERY | Facility: CLINIC | Age: 59
End: 2019-08-02

## 2019-08-06 ENCOUNTER — COMMUNICATION - HEALTHEAST (OUTPATIENT)
Dept: VASCULAR SURGERY | Facility: CLINIC | Age: 59
End: 2019-08-06

## 2019-08-06 ENCOUNTER — RECORDS - HEALTHEAST (OUTPATIENT)
Dept: ADMINISTRATIVE | Facility: OTHER | Age: 59
End: 2019-08-06

## 2019-08-06 ENCOUNTER — OFFICE VISIT - HEALTHEAST (OUTPATIENT)
Dept: FAMILY MEDICINE | Facility: CLINIC | Age: 59
End: 2019-08-06

## 2019-08-06 DIAGNOSIS — R22.2 CHEST WALL MASS: ICD-10-CM

## 2019-08-06 ASSESSMENT — MIFFLIN-ST. JEOR: SCORE: 1173.35

## 2019-08-08 ENCOUNTER — COMMUNICATION - HEALTHEAST (OUTPATIENT)
Dept: FAMILY MEDICINE | Facility: CLINIC | Age: 59
End: 2019-08-08

## 2019-08-08 DIAGNOSIS — F41.8 TEST ANXIETY: ICD-10-CM

## 2019-08-12 ENCOUNTER — HOSPITAL ENCOUNTER (OUTPATIENT)
Dept: MRI IMAGING | Facility: CLINIC | Age: 59
Discharge: HOME | End: 2019-08-12

## 2019-08-12 DIAGNOSIS — R22.2 CHEST WALL MASS: ICD-10-CM

## 2019-08-12 DIAGNOSIS — K09.9 CYST OF ORAL REGION: ICD-10-CM

## 2019-08-13 ENCOUNTER — COMMUNICATION - HEALTHEAST (OUTPATIENT)
Dept: FAMILY MEDICINE | Facility: CLINIC | Age: 59
End: 2019-08-13

## 2019-08-14 ENCOUNTER — AMBULATORY - HEALTHEAST (OUTPATIENT)
Dept: VASCULAR SURGERY | Facility: CLINIC | Age: 59
End: 2019-08-14

## 2019-08-14 ENCOUNTER — COMMUNICATION - HEALTHEAST (OUTPATIENT)
Dept: VASCULAR SURGERY | Facility: CLINIC | Age: 59
End: 2019-08-14

## 2019-08-15 ENCOUNTER — RECORDS - HEALTHEAST (OUTPATIENT)
Dept: FAMILY MEDICINE | Facility: CLINIC | Age: 59
End: 2019-08-15

## 2019-09-04 ENCOUNTER — COMMUNICATION - HEALTHEAST (OUTPATIENT)
Dept: FAMILY MEDICINE | Facility: CLINIC | Age: 59
End: 2019-09-04

## 2019-09-04 DIAGNOSIS — B00.9 HERPES SIMPLEX VIRUS INFECTION: ICD-10-CM

## 2019-09-04 DIAGNOSIS — J45.909 UNCOMPLICATED ASTHMA, UNSPECIFIED ASTHMA SEVERITY, UNSPECIFIED WHETHER PERSISTENT: ICD-10-CM

## 2019-09-06 ENCOUNTER — COMMUNICATION - HEALTHEAST (OUTPATIENT)
Dept: FAMILY MEDICINE | Facility: CLINIC | Age: 59
End: 2019-09-06

## 2019-09-16 ENCOUNTER — OFFICE VISIT - HEALTHEAST (OUTPATIENT)
Dept: FAMILY MEDICINE | Facility: CLINIC | Age: 59
End: 2019-09-16

## 2019-09-16 DIAGNOSIS — L72.9 INFECTED CYST OF SKIN: ICD-10-CM

## 2019-09-16 DIAGNOSIS — R22.1 PARAPHARYNGEAL SPACE MASS: ICD-10-CM

## 2019-09-16 DIAGNOSIS — Z01.818 PRE-OP EXAM: ICD-10-CM

## 2019-09-16 DIAGNOSIS — L08.9 INFECTED CYST OF SKIN: ICD-10-CM

## 2019-09-16 DIAGNOSIS — R59.9 ENLARGED LYMPH NODES: ICD-10-CM

## 2019-09-16 DIAGNOSIS — R30.0 DYSURIA: ICD-10-CM

## 2019-09-16 ASSESSMENT — MIFFLIN-ST. JEOR: SCORE: 1188.32

## 2019-09-17 ENCOUNTER — AMBULATORY - HEALTHEAST (OUTPATIENT)
Dept: LAB | Facility: CLINIC | Age: 59
End: 2019-09-17

## 2019-09-17 DIAGNOSIS — L72.9 INFECTED CYST OF SKIN: ICD-10-CM

## 2019-09-17 DIAGNOSIS — L08.9 INFECTED CYST OF SKIN: ICD-10-CM

## 2019-09-17 DIAGNOSIS — Z01.818 PRE-OP EXAM: ICD-10-CM

## 2019-09-17 DIAGNOSIS — R59.9 ENLARGED LYMPH NODES: ICD-10-CM

## 2019-09-17 LAB
ANION GAP SERPL CALCULATED.3IONS-SCNC: 8 MMOL/L (ref 5–18)
BASOPHILS # BLD AUTO: 0.1 THOU/UL (ref 0–0.2)
BASOPHILS NFR BLD AUTO: 1 % (ref 0–2)
BUN SERPL-MCNC: 12 MG/DL (ref 8–22)
CALCIUM SERPL-MCNC: 10.4 MG/DL (ref 8.5–10.5)
CHLORIDE BLD-SCNC: 106 MMOL/L (ref 98–107)
CO2 SERPL-SCNC: 29 MMOL/L (ref 22–31)
CREAT SERPL-MCNC: 0.69 MG/DL (ref 0.6–1.1)
EOSINOPHIL # BLD AUTO: 0.6 THOU/UL (ref 0–0.4)
EOSINOPHIL NFR BLD AUTO: 8 % (ref 0–6)
ERYTHROCYTE [DISTWIDTH] IN BLOOD BY AUTOMATED COUNT: 11.4 % (ref 11–14.5)
GFR SERPL CREATININE-BSD FRML MDRD: >60 ML/MIN/1.73M2
GLUCOSE BLD-MCNC: 83 MG/DL (ref 70–125)
HCT VFR BLD AUTO: 38.9 % (ref 35–47)
HGB BLD-MCNC: 13.6 G/DL (ref 12–16)
LYMPHOCYTES # BLD AUTO: 3 THOU/UL (ref 0.8–4.4)
LYMPHOCYTES NFR BLD AUTO: 41 % (ref 20–40)
MCH RBC QN AUTO: 32.3 PG (ref 27–34)
MCHC RBC AUTO-ENTMCNC: 35 G/DL (ref 32–36)
MCV RBC AUTO: 92 FL (ref 80–100)
MONOCYTES # BLD AUTO: 0.6 THOU/UL (ref 0–0.9)
MONOCYTES NFR BLD AUTO: 7 % (ref 2–10)
NEUTROPHILS # BLD AUTO: 3.2 THOU/UL (ref 2–7.7)
NEUTROPHILS NFR BLD AUTO: 43 % (ref 50–70)
PLATELET # BLD AUTO: 307 THOU/UL (ref 140–440)
PMV BLD AUTO: 7.3 FL (ref 7–10)
POTASSIUM BLD-SCNC: 4.3 MMOL/L (ref 3.5–5)
RBC # BLD AUTO: 4.22 MILL/UL (ref 3.8–5.4)
SODIUM SERPL-SCNC: 143 MMOL/L (ref 136–145)
WBC: 7.4 THOU/UL (ref 4–11)

## 2019-09-19 ENCOUNTER — COMMUNICATION - HEALTHEAST (OUTPATIENT)
Dept: FAMILY MEDICINE | Facility: CLINIC | Age: 59
End: 2019-09-19

## 2019-09-20 ENCOUNTER — OFFICE VISIT - HEALTHEAST (OUTPATIENT)
Dept: FAMILY MEDICINE | Facility: CLINIC | Age: 59
End: 2019-09-20

## 2019-09-20 DIAGNOSIS — G47.00 INSOMNIA, UNSPECIFIED TYPE: ICD-10-CM

## 2019-09-20 ASSESSMENT — MIFFLIN-ST. JEOR: SCORE: 1185.6

## 2019-09-23 ENCOUNTER — COMMUNICATION - HEALTHEAST (OUTPATIENT)
Dept: FAMILY MEDICINE | Facility: CLINIC | Age: 59
End: 2019-09-23

## 2019-10-07 ENCOUNTER — OFFICE VISIT - HEALTHEAST (OUTPATIENT)
Dept: FAMILY MEDICINE | Facility: CLINIC | Age: 59
End: 2019-10-07

## 2019-10-07 DIAGNOSIS — B34.9 ACUTE VIRAL SYNDROME: ICD-10-CM

## 2019-10-07 DIAGNOSIS — C08.9 CARCINOMA OF SALIVARY GLAND (H): ICD-10-CM

## 2019-10-08 ENCOUNTER — COMMUNICATION - HEALTHEAST (OUTPATIENT)
Dept: VASCULAR SURGERY | Facility: CLINIC | Age: 59
End: 2019-10-08

## 2019-10-15 ENCOUNTER — COMMUNICATION - HEALTHEAST (OUTPATIENT)
Dept: FAMILY MEDICINE | Facility: CLINIC | Age: 59
End: 2019-10-15

## 2019-10-15 ENCOUNTER — OFFICE VISIT - HEALTHEAST (OUTPATIENT)
Dept: FAMILY MEDICINE | Facility: CLINIC | Age: 59
End: 2019-10-15

## 2019-10-15 DIAGNOSIS — F41.8 SITUATIONAL ANXIETY: ICD-10-CM

## 2019-10-22 ENCOUNTER — COMMUNICATION - HEALTHEAST (OUTPATIENT)
Dept: ADMINISTRATIVE | Facility: CLINIC | Age: 59
End: 2019-10-22

## 2019-10-22 ENCOUNTER — HOME INFUSION (PRE-WILLOW HOME INFUSION) (OUTPATIENT)
Dept: PHARMACY | Facility: CLINIC | Age: 59
End: 2019-10-22

## 2019-10-23 ENCOUNTER — HOME INFUSION (PRE-WILLOW HOME INFUSION) (OUTPATIENT)
Dept: PHARMACY | Facility: CLINIC | Age: 59
End: 2019-10-23

## 2019-10-23 NOTE — PROGRESS NOTES
This is a recent snapshot of the patient's [facility] medical record.  For current drug dose and complete information and questions, call [phone] or In Basket pool, [facility] home infusion ([id number])  CSN Number:  [id number]

## 2019-10-24 ENCOUNTER — HOME INFUSION (PRE-WILLOW HOME INFUSION) (OUTPATIENT)
Dept: PHARMACY | Facility: CLINIC | Age: 59
End: 2019-10-24

## 2019-10-25 NOTE — PROGRESS NOTES
This is a recent snapshot of the patient's [facility] medical record.  For current drug dose and complete information and questions, call [phone] or In Basket pool, [facility] home infusion ([id number])  CSN Number:  [id number]

## 2019-10-28 NOTE — PROGRESS NOTES
This is a recent snapshot of the patient's [facility] medical record.  For current drug dose and complete information and questions, call [phone] or In Basket pool, [facility] home infusion ([id number])  CSN Number:  [id number]

## 2019-10-29 ENCOUNTER — HOME INFUSION (PRE-WILLOW HOME INFUSION) (OUTPATIENT)
Dept: PHARMACY | Facility: CLINIC | Age: 59
End: 2019-10-29

## 2019-10-30 ENCOUNTER — COMMUNICATION - HEALTHEAST (OUTPATIENT)
Dept: FAMILY MEDICINE | Facility: CLINIC | Age: 59
End: 2019-10-30

## 2019-10-30 DIAGNOSIS — K21.9 GASTROESOPHAGEAL REFLUX DISEASE WITHOUT ESOPHAGITIS: ICD-10-CM

## 2019-10-30 NOTE — PROGRESS NOTES
This is a recent snapshot of the patient's [facility] medical record.  For current drug dose and complete information and questions, call [phone] or In Basket pool, [facility] home infusion ([id number])  CSN Number:  [id number]

## 2019-11-12 ENCOUNTER — THERAPY VISIT (OUTPATIENT)
Dept: PHYSICAL THERAPY | Facility: CLINIC | Age: 59
End: 2019-11-12
Payer: COMMERCIAL

## 2019-11-12 DIAGNOSIS — M26.609 TMJ DYSFUNCTION: ICD-10-CM

## 2019-11-12 DIAGNOSIS — M25.511 SHOULDER PAIN, RIGHT: ICD-10-CM

## 2019-11-12 DIAGNOSIS — Z47.89 AFTERCARE FOLLOWING SURGERY OF THE MUSCULOSKELETAL SYSTEM: ICD-10-CM

## 2019-11-12 PROCEDURE — 97530 THERAPEUTIC ACTIVITIES: CPT | Mod: GP | Performed by: PHYSICAL THERAPIST

## 2019-11-12 PROCEDURE — 97162 PT EVAL MOD COMPLEX 30 MIN: CPT | Mod: GP | Performed by: PHYSICAL THERAPIST

## 2019-11-12 PROCEDURE — 97110 THERAPEUTIC EXERCISES: CPT | Mod: GP | Performed by: PHYSICAL THERAPIST

## 2019-11-12 NOTE — PROGRESS NOTES
Physical Therapy Initial Evaluation  November 12, 2019     Precautions/Restrictions/MD instructions: PT eval and treat. Shoulder ROM and mandibular opening. 6 visits    Subjective:   Date of Surgery:  Biopsy 9/23/19,   Salivary gland mass and lymph node removal on 10/23/19  MD order: 11/6/19  C/C: right sided neck tightness, pain extending from right suboccipitals to right shoulder, burning from anterior neck extending to mid chest.  Quality of pain is aching, burning and tight, throbbing, achy. Pains are described as constant in nature. Pain is worse: evening. Pain is rated 5/10.   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 Since onset, symptoms are gradually improving. Previous episodes: history of similar pain with bilateral mastectomy 17 years ago  Worsened by: lifting more than 10#, eating, chewing, talking, stress  Alleviated by: Rest.    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 She denies night pain and pain with swallowing. She denies any regional implanted devices. She denies history of other surgeries in the area.  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 Return to MD:  2 months.      Objective:    Posture:   Facial Symmetry: Poor control of right mouth opening, closing and smilng  TMJ AROM:   Movement  Joint noise Deviation   Opening 19 none none   Left Lateral deviation  10 none    Right Lateral Deviation 7 none      Cervical AROM:  (Major, Moderate, Minimal or Nil loss)  Movement Loss Jose L Mod Min Nil Pain   Protrusion  x      Flexion  30 degrees       Retraction  x      Extension 15 degrees    Feels unstable   Left Rotation 40 degrees       Right Rotation 15 degrees       Left Side Bending 10 degrees       Right Side bending 15 degrees       Incision: healing well with good approximation, no redness, swelling or heat.     SHOULDER:     AROM L AROM R   Flex 170 110   Abd 180 70   Extension 50 50    80   Flx/ER C7 Unable due to pain   Ext/IR T10 Unable due to pain     Assessment/Plan:    The patient is a 59 year old female with chief complaint of significant limitation in shoulder and jaw range of motion.    The patient has the following significant findings with corresponding treatment plan.  Diagnosis 1:  Shoulder and jaw pain s/p surgery for removal of salivary gland carcinoma     Pain -  hot/cold therapy, manual therapy, self management, education, directional preference exercise and home program  Decreased ROM/flexibility - manual therapy and therapeutic exercise  Decreased joint mobility - manual therapy and therapeutic exercise  Decreased strength - therapeutic exercise and therapeutic activities  Decreased proprioception - neuro re-education and therapeutic activities  Impaired muscle performance - neuro re-education  Decreased function - therapeutic activities  Impaired posture - neuro re-education    Therapy Evaluation Codes:   1) History comprised of:   Personal factors that impact the plan of care:      Time since onset of symptoms and Work status.    Comorbidity factors that impact the plan of care are:      Asthma, Cancer and Migraines/headaches.     Medications impacting care: Anti-depressant.  2) Examination of Body Systems comprised of:   Body structures and functions that impact the plan of care:      Cervical spine, Head, Shoulder and Thoracic Spine.   Activity limitations that impact the plan of care are:      Bathing,  Bending, Driving, Dressing, Grasping, Lifting, Reading/Computer work, Sitting, Standing, Walking and Working. Eating, talking, smiling, chewing   Clinical presentation characteristics are:    Evolving/Changing.  3) Presentation comprised of:   Presentation scored as Moderate complexity with Evolving presentation with changing characteristics..  4) Decision-Making    Moderate complexity using standardized patient assessment instrument and/or measureable assessment of functional outcome.  Cumulative Therapy Evaluation is: Moderate complexity.    Previous and current functional limitations:  (See Goal Flow Sheet for this information)    Short term and Long term goals: (See Goal Flow Sheet for this information)     Communication ability:  Patient appears to be able to clearly communicate and understand verbal and written communication and follow directions correctly.  Treatment Explanation - The following has been discussed with the patient: RX ordered/plan of care, anticipated outcomes, and possible risks and side effects.  This patient would benefit from PT intervention to resume normal activities.   Rehab potential is good.    Frequency:  2 X week, once daily  Duration:  for 1 weeks tapering to 1 X a week over 4 weeks. Will request additional visits as needed.  Discharge Plan: Achieve all LTGs, be independent in home treatment program, and reach maximal therapeutic benefit.    Please refer to the daily flowsheet for treatment today, total treatment time and time spent performing 1:1 timed codes.

## 2019-11-14 ENCOUNTER — THERAPY VISIT (OUTPATIENT)
Dept: PHYSICAL THERAPY | Facility: CLINIC | Age: 59
End: 2019-11-14
Payer: COMMERCIAL

## 2019-11-14 DIAGNOSIS — M25.511 SHOULDER PAIN, RIGHT: ICD-10-CM

## 2019-11-14 DIAGNOSIS — Z47.89 AFTERCARE FOLLOWING SURGERY OF THE MUSCULOSKELETAL SYSTEM: ICD-10-CM

## 2019-11-14 DIAGNOSIS — M26.609 TMJ DYSFUNCTION: ICD-10-CM

## 2019-11-14 PROCEDURE — 97140 MANUAL THERAPY 1/> REGIONS: CPT | Mod: GP | Performed by: PHYSICAL THERAPIST

## 2019-11-14 PROCEDURE — 97110 THERAPEUTIC EXERCISES: CPT | Mod: GP | Performed by: PHYSICAL THERAPIST

## 2019-11-18 ENCOUNTER — THERAPY VISIT (OUTPATIENT)
Dept: PHYSICAL THERAPY | Facility: CLINIC | Age: 59
End: 2019-11-18
Payer: COMMERCIAL

## 2019-11-18 DIAGNOSIS — M25.511 SHOULDER PAIN, RIGHT: ICD-10-CM

## 2019-11-18 DIAGNOSIS — M26.609 TMJ DYSFUNCTION: ICD-10-CM

## 2019-11-18 DIAGNOSIS — Z47.89 AFTERCARE FOLLOWING SURGERY OF THE MUSCULOSKELETAL SYSTEM: ICD-10-CM

## 2019-11-18 PROCEDURE — 97110 THERAPEUTIC EXERCISES: CPT | Mod: GP | Performed by: PHYSICAL THERAPIST

## 2019-11-18 PROCEDURE — 97140 MANUAL THERAPY 1/> REGIONS: CPT | Mod: GP | Performed by: PHYSICAL THERAPIST

## 2019-11-21 ENCOUNTER — OFFICE VISIT - HEALTHEAST (OUTPATIENT)
Dept: FAMILY MEDICINE | Facility: CLINIC | Age: 59
End: 2019-11-21

## 2019-11-21 ENCOUNTER — COMMUNICATION - HEALTHEAST (OUTPATIENT)
Dept: TELEHEALTH | Facility: CLINIC | Age: 59
End: 2019-11-21

## 2019-11-21 DIAGNOSIS — S43.431A LABRAL TEAR OF SHOULDER, RIGHT, INITIAL ENCOUNTER: ICD-10-CM

## 2019-11-21 DIAGNOSIS — S46.811S: ICD-10-CM

## 2019-11-21 ASSESSMENT — MIFFLIN-ST. JEOR: SCORE: 1159.8

## 2019-11-22 ENCOUNTER — COMMUNICATION - HEALTHEAST (OUTPATIENT)
Dept: FAMILY MEDICINE | Facility: CLINIC | Age: 59
End: 2019-11-22

## 2019-11-25 ENCOUNTER — THERAPY VISIT (OUTPATIENT)
Dept: PHYSICAL THERAPY | Facility: CLINIC | Age: 59
End: 2019-11-25
Payer: COMMERCIAL

## 2019-11-25 ENCOUNTER — HOME INFUSION (PRE-WILLOW HOME INFUSION) (OUTPATIENT)
Dept: PHARMACY | Facility: CLINIC | Age: 59
End: 2019-11-25

## 2019-11-25 DIAGNOSIS — Z47.89 AFTERCARE FOLLOWING SURGERY OF THE MUSCULOSKELETAL SYSTEM: ICD-10-CM

## 2019-11-25 DIAGNOSIS — M26.609 TMJ DYSFUNCTION: ICD-10-CM

## 2019-11-25 DIAGNOSIS — M25.511 SHOULDER PAIN, RIGHT: ICD-10-CM

## 2019-11-25 PROCEDURE — 97140 MANUAL THERAPY 1/> REGIONS: CPT | Mod: GP | Performed by: PHYSICAL THERAPIST

## 2019-11-25 PROCEDURE — 97110 THERAPEUTIC EXERCISES: CPT | Mod: GP | Performed by: PHYSICAL THERAPIST

## 2019-12-05 ENCOUNTER — THERAPY VISIT (OUTPATIENT)
Dept: PHYSICAL THERAPY | Facility: CLINIC | Age: 59
End: 2019-12-05
Payer: COMMERCIAL

## 2019-12-05 DIAGNOSIS — M25.511 SHOULDER PAIN, RIGHT: ICD-10-CM

## 2019-12-05 DIAGNOSIS — Z47.89 AFTERCARE FOLLOWING SURGERY OF THE MUSCULOSKELETAL SYSTEM: ICD-10-CM

## 2019-12-05 DIAGNOSIS — M26.609 TMJ DYSFUNCTION: ICD-10-CM

## 2019-12-05 PROCEDURE — 97110 THERAPEUTIC EXERCISES: CPT | Mod: GP | Performed by: PHYSICAL THERAPIST

## 2019-12-05 PROCEDURE — 97140 MANUAL THERAPY 1/> REGIONS: CPT | Mod: GP | Performed by: PHYSICAL THERAPIST

## 2019-12-05 NOTE — PROGRESS NOTES
PROGRESS  REPORT    Progress reporting period is from 11/12/19 to 12/5/19.       SUBJECTIVE  Subjective changes noted by patient:  Selam reports she has made improvements in shoulder and jaw range of motion with a 50% improvement overall. She is ready to progress to active shoulder strengthening exercises. She has not yet returned to work. She will not be able to return until her lifting restrictions have been removed as she needs to lift up to 35 pounds for her normal work duties. She will follow up with her surgeon in the next few weeks for updated and more specific work restrictions.  Current pain level is 2/10.      Previous pain level was 5/10.   Changes in function:  Yes (See Goal flowsheet attached for changes in current functional level)  Adverse reaction to treatment or activity: None    OBJECTIVE  Changes noted in objective findings:  Yes,   TMJ AROM:   Movement   Joint noise Deviation   Opening 25 none none   Left Lateral deviation  10 none     Right Lateral Deviation 10 none        Cervical AROM: (Major, Moderate, Minimal or Nil loss)  Movement Loss Jose L Mod Min Nil Pain   Protrusion   x         Flexion   40          Retraction   x         Extension   40     No instability    Left Rotation     70       Right Rotation  60          Incision: near healed, mobility is improving     SHOULDER:       AROM L AROM R   Flex 170 150   Abd 180 155   Extension 50 60    85   Flx/ER C7 C7   Ext/IR T10 L1         ASSESSMENT/PLAN  Updated problem list and treatment plan: Diagnosis 1:  Right shoulder ROM and limited mandibular mouth opening    Pain -  hot/cold therapy, manual therapy, self management, education and home program  Decreased ROM/flexibility - manual therapy and therapeutic exercise  Decreased joint mobility - manual therapy and therapeutic exercise  Decreased strength - therapeutic exercise and therapeutic activities  Decreased proprioception - neuro re-education and therapeutic activities  Impaired  gait - gait training  Impaired muscle performance - neuro re-education  Decreased function - therapeutic activities  Impaired posture - neuro re-education  STG/LTGs have been met or progress has been made towards goals:  Yes (See Goal flow sheet completed today.)  Assessment of Progress: The patient's condition is improving.  Self Management Plans:  Patient has been instructed in a home treatment program.  I have re-evaluated this patient and find that the nature, scope, duration and intensity of the therapy is appropriate for the medical condition of the patient.  Selam continues to require the following intervention to meet STG and LTG's:  PT    Recommendations:  This patient would benefit from continued therapy.     Frequency:  1 X week, once daily  Duration:  for 6 weeks        Please refer to the daily flowsheet for treatment today, total treatment time and time spent performing 1:1 timed codes.

## 2019-12-05 NOTE — LETTER
[facility] PHYSICAL THERAPY  [address] [place] [address]  [phone]    2019    Re: Selam K Edgard   :   1960  MRN:  [id number]   REFERRING PHYSICIAN:   Solitario Posada MD    [facility]    Date of Initial Evaluation:  19  Visits:  Rxs Used: 5  Reason for Referral:     TMJ dysfunction  Shoulder pain, right  Aftercare following surgery of the musculoskeletal system        PROGRESS  REPORT    Progress reporting period is from 19 to 19.       SUBJECTIVE  Subjective changes noted by patient:  Selam reports she has made improvements in shoulder and jaw range of motion with a 50% improvement overall. She is ready to progress to active shoulder strengthening exercises. She has not yet returned to work. She will not be able to return until her lifting restrictions have been removed as she needs to lift up to 35 pounds for her normal work duties. She will follow up with her surgeon in the next few weeks for updated and more specific work restrictions.  Current pain level is 2/10.      Previous pain level was 5/10.   Changes in function:  Yes (See Goal flowsheet attached for changes in current functional level)  Adverse reaction to treatment or activity: None    OBJECTIVE  Changes noted in objective findings:  Yes,                           Re: Selam JOAQUÍN Edgard   :   1960            TMJ AROM:   Movement   Joint noise Deviation   Opening 25 none none   Left Lateral deviation  10 none     Right Lateral Deviation 10 none        Cervical AROM: (Major, Moderate, Minimal or Nil loss)  Movement Loss Jose L Mod Min Nil Pain   Protrusion   x         Flexion   40          Retraction   x         Extension   40     No instability    Left Rotation     70       Right Rotation  60            Incision: near healed, mobility is improving     SHOULDER:       AROM L AROM R   Flex 170 150   Abd  180 155   Extension 50 60    85   Flx/ER C7 C7   Ext/IR T10 L1       ASSESSMENT/PLAN  Updated problem list and treatment plan: Diagnosis 1:  Right shoulder ROM and limited mandibular mouth opening  Pain -  hot/cold therapy, manual therapy, self management, education and home program  Decreased ROM/flexibility - manual therapy and therapeutic exercise  Decreased joint mobility - manual therapy and therapeutic exercise  Decreased strength - therapeutic exercise and therapeutic activities  Decreased proprioception - neuro re-education and therapeutic activities  Impaired gait - gait training  Impaired muscle performance - neuro re-education              Re: Selam Rene   :   1960              Decreased function - therapeutic activities  Impaired posture - neuro re-education  STG/LTGs have been met or progress has been made towards goals:  Yes (See Goal flow sheet completed today.)  Assessment of Progress: The patient's condition is improving.  Self Management Plans:  Patient has been instructed in a home treatment program.  I have re-evaluated this patient and find that the nature, scope, duration and intensity of the therapy is appropriate for the medical condition of the patient.  Selam continues to require the following intervention to meet STG and LTG's:  PT    Recommendations:  This patient would benefit from continued therapy.     Frequency:  1 X week, once daily  Duration:  for 6 weeks    Please refer to the daily flowsheet for treatment today, total treatment time and time spent performing 1:1 timed codes.      Thank you for your referral.    INQUIRIES  Therapist: Genevieve Yuan DPT   [facility] PHYSICAL THERAPY  [address] [place] [address]  Phone: [phone]  Fax: [phone]

## 2019-12-11 NOTE — PROGRESS NOTES
This is a recent snapshot of the patient's [facility] medical record.  For current drug dose and complete information and questions, call [phone] or In Basket pool, [facility] home infusion ([id number])  CSN Number:  [id number]

## 2019-12-12 ENCOUNTER — THERAPY VISIT (OUTPATIENT)
Dept: PHYSICAL THERAPY | Facility: CLINIC | Age: 59
End: 2019-12-12
Payer: COMMERCIAL

## 2019-12-12 DIAGNOSIS — Z47.89 AFTERCARE FOLLOWING SURGERY OF THE MUSCULOSKELETAL SYSTEM: ICD-10-CM

## 2019-12-12 DIAGNOSIS — M26.609 TMJ DYSFUNCTION: ICD-10-CM

## 2019-12-12 DIAGNOSIS — M25.511 SHOULDER PAIN, RIGHT: ICD-10-CM

## 2019-12-12 PROCEDURE — 97110 THERAPEUTIC EXERCISES: CPT | Mod: GP | Performed by: PHYSICAL THERAPIST

## 2019-12-20 ENCOUNTER — OFFICE VISIT - HEALTHEAST (OUTPATIENT)
Dept: FAMILY MEDICINE | Facility: CLINIC | Age: 59
End: 2019-12-20

## 2019-12-20 ENCOUNTER — COMMUNICATION - HEALTHEAST (OUTPATIENT)
Dept: ONCOLOGY | Facility: CLINIC | Age: 59
End: 2019-12-20

## 2019-12-20 ENCOUNTER — OFFICE VISIT - HEALTHEAST (OUTPATIENT)
Dept: OTOLARYNGOLOGY | Facility: CLINIC | Age: 59
End: 2019-12-20

## 2019-12-20 DIAGNOSIS — C06.9: ICD-10-CM

## 2019-12-20 DIAGNOSIS — N39.0 RECURRENT UTI: ICD-10-CM

## 2019-12-20 DIAGNOSIS — R35.0 URINE FREQUENCY: ICD-10-CM

## 2019-12-20 DIAGNOSIS — N39.0 URINARY TRACT INFECTION WITHOUT HEMATURIA, SITE UNSPECIFIED: ICD-10-CM

## 2019-12-20 LAB
ALBUMIN UR-MCNC: NEGATIVE MG/DL
APPEARANCE UR: ABNORMAL
BACTERIA #/AREA URNS HPF: ABNORMAL HPF
BILIRUB UR QL STRIP: NEGATIVE
COLOR UR AUTO: YELLOW
GLUCOSE UR STRIP-MCNC: NEGATIVE MG/DL
HGB UR QL STRIP: ABNORMAL
KETONES UR STRIP-MCNC: NEGATIVE MG/DL
LEUKOCYTE ESTERASE UR QL STRIP: ABNORMAL
NITRATE UR QL: POSITIVE
PH UR STRIP: 6.5 [PH] (ref 5–8)
RBC #/AREA URNS AUTO: ABNORMAL HPF
SP GR UR STRIP: 1.02 (ref 1–1.03)
SQUAMOUS #/AREA URNS AUTO: ABNORMAL LPF
UROBILINOGEN UR STRIP-ACNC: ABNORMAL
WBC #/AREA URNS AUTO: ABNORMAL HPF

## 2019-12-20 ASSESSMENT — MIFFLIN-ST. JEOR: SCORE: 1149.31

## 2019-12-22 LAB — BACTERIA SPEC CULT: ABNORMAL

## 2019-12-23 ENCOUNTER — COMMUNICATION - HEALTHEAST (OUTPATIENT)
Dept: ONCOLOGY | Facility: HOSPITAL | Age: 59
End: 2019-12-23

## 2019-12-24 ENCOUNTER — COMMUNICATION - HEALTHEAST (OUTPATIENT)
Dept: FAMILY MEDICINE | Facility: CLINIC | Age: 59
End: 2019-12-24

## 2019-12-24 ENCOUNTER — OFFICE VISIT - HEALTHEAST (OUTPATIENT)
Dept: FAMILY MEDICINE | Facility: CLINIC | Age: 59
End: 2019-12-24

## 2019-12-24 ENCOUNTER — RECORDS - HEALTHEAST (OUTPATIENT)
Dept: RADIOLOGY | Facility: CLINIC | Age: 59
End: 2019-12-24

## 2019-12-24 DIAGNOSIS — M25.511 RIGHT SHOULDER PAIN, UNSPECIFIED CHRONICITY: ICD-10-CM

## 2019-12-24 DIAGNOSIS — J39.2 PHARYNGEAL MASS: ICD-10-CM

## 2019-12-24 DIAGNOSIS — C08.9 SALIVARY GLAND CARCINOMA (H): ICD-10-CM

## 2019-12-24 ASSESSMENT — MIFFLIN-ST. JEOR: SCORE: 1166.55

## 2020-01-07 ENCOUNTER — COMMUNICATION - HEALTHEAST (OUTPATIENT)
Dept: ADMINISTRATIVE | Facility: HOSPITAL | Age: 60
End: 2020-01-07

## 2020-01-07 ENCOUNTER — OFFICE VISIT - HEALTHEAST (OUTPATIENT)
Dept: ONCOLOGY | Facility: HOSPITAL | Age: 60
End: 2020-01-07

## 2020-01-07 DIAGNOSIS — C06.9: ICD-10-CM

## 2020-01-07 ASSESSMENT — MIFFLIN-ST. JEOR: SCORE: 1153.06

## 2020-01-13 ENCOUNTER — OFFICE VISIT - HEALTHEAST (OUTPATIENT)
Dept: OTOLARYNGOLOGY | Facility: CLINIC | Age: 60
End: 2020-01-13

## 2020-01-13 DIAGNOSIS — R22.0 TONGUE SWELLING: ICD-10-CM

## 2020-02-26 ENCOUNTER — OFFICE VISIT - HEALTHEAST (OUTPATIENT)
Dept: ONCOLOGY | Facility: HOSPITAL | Age: 60
End: 2020-02-26

## 2020-02-26 ENCOUNTER — RECORDS - HEALTHEAST (OUTPATIENT)
Dept: ADMINISTRATIVE | Facility: OTHER | Age: 60
End: 2020-02-26

## 2020-02-26 DIAGNOSIS — Z80.3 FAMILY HISTORY OF MALIGNANT NEOPLASM OF BREAST: ICD-10-CM

## 2020-02-26 DIAGNOSIS — Z80.0 FAMILY HISTORY OF COLON CANCER: ICD-10-CM

## 2020-02-26 DIAGNOSIS — Z71.83 ENCOUNTER FOR NONPROCREATIVE GENETIC COUNSELING: ICD-10-CM

## 2020-02-26 DIAGNOSIS — Z85.828 HISTORY OF BASAL CELL CARCINOMA: ICD-10-CM

## 2020-02-26 DIAGNOSIS — Z80.8 FAMILY HISTORY OF BRAIN CANCER: ICD-10-CM

## 2020-02-26 DIAGNOSIS — C50.919 TRIPLE NEGATIVE MALIGNANT NEOPLASM OF BREAST (H): ICD-10-CM

## 2020-02-26 DIAGNOSIS — Z17.421 TRIPLE NEGATIVE MALIGNANT NEOPLASM OF BREAST (H): ICD-10-CM

## 2020-02-26 DIAGNOSIS — Z83.49: ICD-10-CM

## 2020-03-02 ENCOUNTER — OFFICE VISIT - HEALTHEAST (OUTPATIENT)
Dept: FAMILY MEDICINE | Facility: CLINIC | Age: 60
End: 2020-03-02

## 2020-03-02 DIAGNOSIS — M54.2 ANTERIOR NECK PAIN: ICD-10-CM

## 2020-03-02 DIAGNOSIS — R10.13 EPIGASTRIC PAIN: ICD-10-CM

## 2020-03-02 DIAGNOSIS — Z13.220 SCREENING FOR LIPID DISORDERS: ICD-10-CM

## 2020-03-02 DIAGNOSIS — F32.5 MAJOR DEPRESSIVE DISORDER WITH SINGLE EPISODE, IN FULL REMISSION (H): ICD-10-CM

## 2020-03-02 DIAGNOSIS — G43.719 INTRACTABLE CHRONIC MIGRAINE WITHOUT AURA AND WITHOUT STATUS MIGRAINOSUS: ICD-10-CM

## 2020-03-02 LAB
ALBUMIN SERPL-MCNC: 3.8 G/DL (ref 3.5–5)
ALP SERPL-CCNC: 79 U/L (ref 45–120)
ALT SERPL W P-5'-P-CCNC: <9 U/L (ref 0–45)
ANION GAP SERPL CALCULATED.3IONS-SCNC: 10 MMOL/L (ref 5–18)
AST SERPL W P-5'-P-CCNC: 17 U/L (ref 0–40)
BILIRUB SERPL-MCNC: 0.7 MG/DL (ref 0–1)
BUN SERPL-MCNC: 14 MG/DL (ref 8–22)
CALCIUM SERPL-MCNC: 9.7 MG/DL (ref 8.5–10.5)
CHLORIDE BLD-SCNC: 102 MMOL/L (ref 98–107)
CHOLEST SERPL-MCNC: 228 MG/DL
CO2 SERPL-SCNC: 26 MMOL/L (ref 22–31)
CREAT SERPL-MCNC: 0.65 MG/DL (ref 0.6–1.1)
ERYTHROCYTE [DISTWIDTH] IN BLOOD BY AUTOMATED COUNT: 11.6 % (ref 11–14.5)
FASTING STATUS PATIENT QL REPORTED: NO
GFR SERPL CREATININE-BSD FRML MDRD: >60 ML/MIN/1.73M2
GLUCOSE BLD-MCNC: 102 MG/DL (ref 70–125)
HCT VFR BLD AUTO: 38.8 % (ref 35–47)
HDLC SERPL-MCNC: 69 MG/DL
HGB BLD-MCNC: 13.2 G/DL (ref 12–16)
LDLC SERPL CALC-MCNC: 136 MG/DL
MCH RBC QN AUTO: 31.6 PG (ref 27–34)
MCHC RBC AUTO-ENTMCNC: 34.2 G/DL (ref 32–36)
MCV RBC AUTO: 92 FL (ref 80–100)
PLATELET # BLD AUTO: 323 THOU/UL (ref 140–440)
PMV BLD AUTO: 7.7 FL (ref 7–10)
POTASSIUM BLD-SCNC: 4.2 MMOL/L (ref 3.5–5)
PROT SERPL-MCNC: 7.2 G/DL (ref 6–8)
RBC # BLD AUTO: 4.2 MILL/UL (ref 3.8–5.4)
SODIUM SERPL-SCNC: 138 MMOL/L (ref 136–145)
TRIGL SERPL-MCNC: 114 MG/DL
TSH SERPL DL<=0.005 MIU/L-ACNC: 2.53 UIU/ML (ref 0.3–5)
WBC: 7.2 THOU/UL (ref 4–11)

## 2020-03-02 ASSESSMENT — MIFFLIN-ST. JEOR: SCORE: 1154.42

## 2020-03-02 ASSESSMENT — PATIENT HEALTH QUESTIONNAIRE - PHQ9: SUM OF ALL RESPONSES TO PHQ QUESTIONS 1-9: 1

## 2020-03-03 ENCOUNTER — COMMUNICATION - HEALTHEAST (OUTPATIENT)
Dept: FAMILY MEDICINE | Facility: CLINIC | Age: 60
End: 2020-03-03

## 2020-03-03 ENCOUNTER — AMBULATORY - HEALTHEAST (OUTPATIENT)
Dept: ADMINISTRATIVE | Facility: CLINIC | Age: 60
End: 2020-03-03

## 2020-03-03 ENCOUNTER — SURGERY - HEALTHEAST (OUTPATIENT)
Dept: PODIATRY | Facility: CLINIC | Age: 60
End: 2020-03-03

## 2020-03-03 ENCOUNTER — AMBULATORY - HEALTHEAST (OUTPATIENT)
Dept: PODIATRY | Facility: CLINIC | Age: 60
End: 2020-03-03

## 2020-03-03 ENCOUNTER — COMMUNICATION - HEALTHEAST (OUTPATIENT)
Dept: ADMINISTRATIVE | Facility: CLINIC | Age: 60
End: 2020-03-03

## 2020-03-03 DIAGNOSIS — M89.30 HYPERTROPHY OF BONE: ICD-10-CM

## 2020-03-10 ENCOUNTER — OFFICE VISIT - HEALTHEAST (OUTPATIENT)
Dept: PODIATRY | Facility: CLINIC | Age: 60
End: 2020-03-10

## 2020-03-10 DIAGNOSIS — L84 TYLOMA: ICD-10-CM

## 2020-03-10 DIAGNOSIS — M89.30 HYPERTROPHY OF BONE: ICD-10-CM

## 2020-03-17 ENCOUNTER — COMMUNICATION - HEALTHEAST (OUTPATIENT)
Dept: PODIATRY | Facility: CLINIC | Age: 60
End: 2020-03-17

## 2020-03-18 ENCOUNTER — COMMUNICATION - HEALTHEAST (OUTPATIENT)
Dept: FAMILY MEDICINE | Facility: CLINIC | Age: 60
End: 2020-03-18

## 2020-03-23 ENCOUNTER — COMMUNICATION - HEALTHEAST (OUTPATIENT)
Dept: FAMILY MEDICINE | Facility: CLINIC | Age: 60
End: 2020-03-23

## 2020-03-23 PROBLEM — M25.511 SHOULDER PAIN, RIGHT: Status: RESOLVED | Noted: 2019-11-12 | Resolved: 2020-03-23

## 2020-03-23 PROBLEM — M26.609 TMJ DYSFUNCTION: Status: RESOLVED | Noted: 2019-11-12 | Resolved: 2020-03-23

## 2020-03-23 PROBLEM — Z47.89 AFTERCARE FOLLOWING SURGERY OF THE MUSCULOSKELETAL SYSTEM: Status: RESOLVED | Noted: 2019-11-12 | Resolved: 2020-03-23

## 2020-03-23 NOTE — PROGRESS NOTES
Discharge Note    Progress reporting period is from last progress note on  Dec 5 to Dec 12, 2019.    Selam failed to follow up and current status is unknown.  Please see information below for last relevant information on current status.  Patient seen for 6 visits.    SUBJECTIVE  Subjective changes noted by patient:  More soreness and some tingling at the back of the right suboccipital muscle. She has been focusing on the shoulder because she is more sore at the top of the shoulder. 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  Current pain level is 2/10.     Previous pain level was  5/10.   Changes in function:  Yes (See Goal flowsheet attached for changes in current functional level)  Adverse reaction to treatment or activity: None    OBJECTIVE  Changes noted in objective findings: Flexion 150, , ER 82, EXT 60. Cervical extension 50, B rotation with limits at end range     ASSESSMENT/PLAN  Diagnosis: right shoulder   Updated problem list and treatment plan:   Pain - HEP  Decreased ROM/flexibility - HEP  Decreased function - HEP  Decreased strength - HEP  Impaired muscle performance - HEP  Impaired gait - HEP  Decreased proprioception - HEP  Decreased joint mobility - HEP  STG/LTGs have been met or progress has been made towards goals:  Yes, please see goal flowsheet for most current information  Assessment of Progress: current status is unknown.    Last current status: Pt is progressing as expected   Self Management Plans:  HEP  I have re-evaluated this patient and find that the nature, scope, duration and intensity of the therapy is appropriate for the medical condition of the patient.  Selam continues to require the following intervention to meet STG and LTG's:  HEP.    Recommendations:  Discharge with current home program.  Patient to follow up with MD as needed.    Please refer to the daily flowsheet for treatment today, total treatment time and time spent performing  1:1 timed codes.

## 2020-03-26 ENCOUNTER — COMMUNICATION - HEALTHEAST (OUTPATIENT)
Dept: FAMILY MEDICINE | Facility: CLINIC | Age: 60
End: 2020-03-26

## 2020-03-26 DIAGNOSIS — J45.909 UNCOMPLICATED ASTHMA, UNSPECIFIED ASTHMA SEVERITY, UNSPECIFIED WHETHER PERSISTENT: ICD-10-CM

## 2020-04-01 ENCOUNTER — RECORDS - HEALTHEAST (OUTPATIENT)
Dept: ADMINISTRATIVE | Facility: OTHER | Age: 60
End: 2020-04-01

## 2020-04-01 ENCOUNTER — COMMUNICATION - HEALTHEAST (OUTPATIENT)
Dept: ONCOLOGY | Facility: HOSPITAL | Age: 60
End: 2020-04-01

## 2020-04-02 ENCOUNTER — OFFICE VISIT - HEALTHEAST (OUTPATIENT)
Dept: ONCOLOGY | Facility: CLINIC | Age: 60
End: 2020-04-02

## 2020-04-02 DIAGNOSIS — Z85.828 HISTORY OF BASAL CELL CARCINOMA: ICD-10-CM

## 2020-04-02 DIAGNOSIS — Z80.0 FAMILY HISTORY OF COLON CANCER: ICD-10-CM

## 2020-04-02 DIAGNOSIS — Z17.421 TRIPLE NEGATIVE MALIGNANT NEOPLASM OF BREAST (H): ICD-10-CM

## 2020-04-02 DIAGNOSIS — Z80.3 FAMILY HISTORY OF MALIGNANT NEOPLASM OF BREAST: ICD-10-CM

## 2020-04-02 DIAGNOSIS — C06.9: ICD-10-CM

## 2020-04-02 DIAGNOSIS — C50.919 TRIPLE NEGATIVE MALIGNANT NEOPLASM OF BREAST (H): ICD-10-CM

## 2020-04-07 ENCOUNTER — OFFICE VISIT - HEALTHEAST (OUTPATIENT)
Dept: ONCOLOGY | Facility: HOSPITAL | Age: 60
End: 2020-04-07

## 2020-04-09 ENCOUNTER — COMMUNICATION - HEALTHEAST (OUTPATIENT)
Dept: FAMILY MEDICINE | Facility: CLINIC | Age: 60
End: 2020-04-09

## 2020-04-13 ENCOUNTER — OFFICE VISIT - HEALTHEAST (OUTPATIENT)
Dept: FAMILY MEDICINE | Facility: CLINIC | Age: 60
End: 2020-04-13

## 2020-04-13 DIAGNOSIS — C06.9: ICD-10-CM

## 2020-04-22 ENCOUNTER — COMMUNICATION - HEALTHEAST (OUTPATIENT)
Dept: FAMILY MEDICINE | Facility: CLINIC | Age: 60
End: 2020-04-22

## 2020-04-28 ENCOUNTER — COMMUNICATION - HEALTHEAST (OUTPATIENT)
Dept: OTOLARYNGOLOGY | Facility: CLINIC | Age: 60
End: 2020-04-28

## 2020-05-21 ENCOUNTER — COMMUNICATION - HEALTHEAST (OUTPATIENT)
Dept: LAB | Facility: CLINIC | Age: 60
End: 2020-05-21

## 2020-05-21 ENCOUNTER — COMMUNICATION - HEALTHEAST (OUTPATIENT)
Dept: SCHEDULING | Facility: CLINIC | Age: 60
End: 2020-05-21

## 2020-05-21 ENCOUNTER — COMMUNICATION - HEALTHEAST (OUTPATIENT)
Dept: FAMILY MEDICINE | Facility: CLINIC | Age: 60
End: 2020-05-21

## 2020-05-21 ENCOUNTER — OFFICE VISIT - HEALTHEAST (OUTPATIENT)
Dept: FAMILY MEDICINE | Facility: CLINIC | Age: 60
End: 2020-05-21

## 2020-05-21 DIAGNOSIS — R30.0 DYSURIA: ICD-10-CM

## 2020-05-22 ENCOUNTER — COMMUNICATION - HEALTHEAST (OUTPATIENT)
Dept: LAB | Facility: CLINIC | Age: 60
End: 2020-05-22

## 2020-05-22 DIAGNOSIS — R30.0 DYSURIA: ICD-10-CM

## 2020-05-26 ENCOUNTER — COMMUNICATION - HEALTHEAST (OUTPATIENT)
Dept: FAMILY MEDICINE | Facility: CLINIC | Age: 60
End: 2020-05-26

## 2020-06-18 ENCOUNTER — OFFICE VISIT - HEALTHEAST (OUTPATIENT)
Dept: FAMILY MEDICINE | Facility: CLINIC | Age: 60
End: 2020-06-18

## 2020-06-18 DIAGNOSIS — C18.7 MALIGNANT NEOPLASM OF SIGMOID COLON (H): ICD-10-CM

## 2020-06-18 DIAGNOSIS — Z85.828 HISTORY OF SKIN CANCER: ICD-10-CM

## 2020-06-18 DIAGNOSIS — Z85.3 PERSONAL HISTORY OF MALIGNANT NEOPLASM OF BREAST: ICD-10-CM

## 2020-06-18 DIAGNOSIS — K11.8 MASS OF SALIVARY GLAND: ICD-10-CM

## 2020-06-22 ENCOUNTER — COMMUNICATION - HEALTHEAST (OUTPATIENT)
Dept: ONCOLOGY | Facility: HOSPITAL | Age: 60
End: 2020-06-22

## 2020-06-23 ENCOUNTER — COMMUNICATION - HEALTHEAST (OUTPATIENT)
Dept: FAMILY MEDICINE | Facility: CLINIC | Age: 60
End: 2020-06-23

## 2020-06-30 ENCOUNTER — OFFICE VISIT - HEALTHEAST (OUTPATIENT)
Dept: FAMILY MEDICINE | Facility: CLINIC | Age: 60
End: 2020-06-30

## 2020-06-30 DIAGNOSIS — C18.7 MALIGNANT NEOPLASM OF SIGMOID COLON (H): ICD-10-CM

## 2020-06-30 DIAGNOSIS — C08.9 SALIVARY GLAND CARCINOMA (H): ICD-10-CM

## 2020-06-30 DIAGNOSIS — F41.8 SITUATIONAL ANXIETY: ICD-10-CM

## 2020-06-30 DIAGNOSIS — Z85.3 PERSONAL HISTORY OF MALIGNANT NEOPLASM OF BREAST: ICD-10-CM

## 2020-07-02 ENCOUNTER — COMMUNICATION - HEALTHEAST (OUTPATIENT)
Dept: FAMILY MEDICINE | Facility: CLINIC | Age: 60
End: 2020-07-02

## 2020-07-07 ENCOUNTER — OFFICE VISIT - HEALTHEAST (OUTPATIENT)
Dept: ONCOLOGY | Facility: HOSPITAL | Age: 60
End: 2020-07-07

## 2020-07-07 DIAGNOSIS — C50.919 MALIGNANT NEOPLASM OF FEMALE BREAST, UNSPECIFIED ESTROGEN RECEPTOR STATUS, UNSPECIFIED LATERALITY, UNSPECIFIED SITE OF BREAST (H): ICD-10-CM

## 2020-07-07 DIAGNOSIS — C06.9: ICD-10-CM

## 2020-07-07 DIAGNOSIS — C08.9 SALIVARY GLAND CARCINOMA (H): ICD-10-CM

## 2020-07-07 ASSESSMENT — MIFFLIN-ST. JEOR: SCORE: 1175.74

## 2020-08-21 ENCOUNTER — OFFICE VISIT - HEALTHEAST (OUTPATIENT)
Dept: FAMILY MEDICINE | Facility: CLINIC | Age: 60
End: 2020-08-21

## 2020-08-21 DIAGNOSIS — J01.01 ACUTE RECURRENT MAXILLARY SINUSITIS: ICD-10-CM

## 2020-09-08 ENCOUNTER — COMMUNICATION - HEALTHEAST (OUTPATIENT)
Dept: FAMILY MEDICINE | Facility: CLINIC | Age: 60
End: 2020-09-08

## 2020-09-10 ENCOUNTER — COMMUNICATION - HEALTHEAST (OUTPATIENT)
Dept: FAMILY MEDICINE | Facility: CLINIC | Age: 60
End: 2020-09-10

## 2020-09-10 DIAGNOSIS — J45.909 UNCOMPLICATED ASTHMA, UNSPECIFIED ASTHMA SEVERITY, UNSPECIFIED WHETHER PERSISTENT: ICD-10-CM

## 2020-09-14 ENCOUNTER — OFFICE VISIT - HEALTHEAST (OUTPATIENT)
Dept: FAMILY MEDICINE | Facility: CLINIC | Age: 60
End: 2020-09-14

## 2020-09-14 DIAGNOSIS — J01.01 ACUTE RECURRENT MAXILLARY SINUSITIS: ICD-10-CM

## 2020-09-14 DIAGNOSIS — Z76.89 ENCOUNTER TO ESTABLISH CARE: ICD-10-CM

## 2020-09-18 ENCOUNTER — COMMUNICATION - HEALTHEAST (OUTPATIENT)
Dept: ONCOLOGY | Facility: HOSPITAL | Age: 60
End: 2020-09-18

## 2020-09-30 ENCOUNTER — COMMUNICATION - HEALTHEAST (OUTPATIENT)
Dept: ONCOLOGY | Facility: HOSPITAL | Age: 60
End: 2020-09-30

## 2020-10-05 ENCOUNTER — VIRTUAL VISIT (OUTPATIENT)
Dept: FAMILY MEDICINE | Facility: OTHER | Age: 60
End: 2020-10-05
Payer: COMMERCIAL

## 2020-10-05 ENCOUNTER — COMMUNICATION - HEALTHEAST (OUTPATIENT)
Dept: SCHEDULING | Facility: CLINIC | Age: 60
End: 2020-10-05

## 2020-10-05 PROCEDURE — 99421 OL DIG E/M SVC 5-10 MIN: CPT | Performed by: FAMILY MEDICINE

## 2020-10-06 ENCOUNTER — COMMUNICATION - HEALTHEAST (OUTPATIENT)
Dept: FAMILY MEDICINE | Facility: CLINIC | Age: 60
End: 2020-10-06

## 2020-10-06 DIAGNOSIS — J45.909 UNCOMPLICATED ASTHMA, UNSPECIFIED ASTHMA SEVERITY, UNSPECIFIED WHETHER PERSISTENT: ICD-10-CM

## 2020-10-06 DIAGNOSIS — Z20.822 ENCOUNTER FOR LABORATORY TESTING FOR COVID-19 VIRUS: Primary | ICD-10-CM

## 2020-10-06 PROCEDURE — U0003 INFECTIOUS AGENT DETECTION BY NUCLEIC ACID (DNA OR RNA); SEVERE ACUTE RESPIRATORY SYNDROME CORONAVIRUS 2 (SARS-COV-2) (CORONAVIRUS DISEASE [COVID-19]), AMPLIFIED PROBE TECHNIQUE, MAKING USE OF HIGH THROUGHPUT TECHNOLOGIES AS DESCRIBED BY CMS-2020-01-R: HCPCS | Performed by: FAMILY MEDICINE

## 2020-10-07 LAB
SARS-COV-2 RNA SPEC QL NAA+PROBE: NOT DETECTED
SPECIMEN SOURCE: NORMAL

## 2020-10-20 ENCOUNTER — AMBULATORY - HEALTHEAST (OUTPATIENT)
Dept: ONCOLOGY | Facility: HOSPITAL | Age: 60
End: 2020-10-20

## 2020-10-20 DIAGNOSIS — R45.89 ANXIETY ABOUT HEALTH: ICD-10-CM

## 2020-10-28 ENCOUNTER — HOSPITAL ENCOUNTER (OUTPATIENT)
Dept: CT IMAGING | Facility: HOSPITAL | Age: 60
Setting detail: RADIATION/ONCOLOGY SERIES
Discharge: STILL ADMITTED OR ADMINISTRATIVE | End: 2020-10-28
Attending: INTERNAL MEDICINE

## 2020-10-28 ENCOUNTER — HOSPITAL ENCOUNTER (OUTPATIENT)
Dept: MRI IMAGING | Facility: HOSPITAL | Age: 60
Setting detail: RADIATION/ONCOLOGY SERIES
Discharge: STILL ADMITTED OR ADMINISTRATIVE | End: 2020-10-28
Attending: INTERNAL MEDICINE

## 2020-10-28 DIAGNOSIS — C06.9: ICD-10-CM

## 2020-10-28 LAB
CREAT BLD-MCNC: 0.7 MG/DL (ref 0.6–1.1)
CREAT BLD-MCNC: 0.8 MG/DL (ref 0.6–1.1)
GFR SERPL CREATININE-BSD FRML MDRD: >60 ML/MIN/1.73M2
GFR SERPL CREATININE-BSD FRML MDRD: >60 ML/MIN/1.73M2

## 2020-11-02 ENCOUNTER — OFFICE VISIT - HEALTHEAST (OUTPATIENT)
Dept: ONCOLOGY | Facility: HOSPITAL | Age: 60
End: 2020-11-02

## 2020-11-02 DIAGNOSIS — C06.9: ICD-10-CM

## 2020-11-02 ASSESSMENT — MIFFLIN-ST. JEOR: SCORE: 1172.11

## 2020-11-11 ENCOUNTER — OFFICE VISIT - HEALTHEAST (OUTPATIENT)
Dept: INTERNAL MEDICINE | Facility: CLINIC | Age: 60
End: 2020-11-11

## 2020-11-11 DIAGNOSIS — G44.219 EPISODIC TENSION-TYPE HEADACHE, NOT INTRACTABLE: ICD-10-CM

## 2020-11-11 ASSESSMENT — MIFFLIN-ST. JEOR: SCORE: 1154.42

## 2020-11-25 PROBLEM — G43.909 MIGRAINE HEADACHE: Status: ACTIVE | Noted: 2020-11-25

## 2020-11-25 SDOH — HEALTH STABILITY: MENTAL HEALTH: HOW MANY STANDARD DRINKS CONTAINING ALCOHOL DO YOU HAVE ON A TYPICAL DAY?: NOT ASKED

## 2020-11-25 SDOH — HEALTH STABILITY: MENTAL HEALTH: HOW OFTEN DO YOU HAVE A DRINK CONTAINING ALCOHOL?: 2-4 TIMES A MONTH

## 2020-11-25 SDOH — HEALTH STABILITY: MENTAL HEALTH: HOW OFTEN DO YOU HAVE 6 OR MORE DRINKS ON ONE OCCASION?: NOT ASKED

## 2020-11-27 ENCOUNTER — RECORDS - HEALTHEAST (OUTPATIENT)
Dept: LAB | Facility: HOSPITAL | Age: 60
End: 2020-11-27

## 2020-11-27 ENCOUNTER — VIRTUAL VISIT (OUTPATIENT)
Dept: NEUROLOGY | Facility: CLINIC | Age: 60
End: 2020-11-27
Payer: COMMERCIAL

## 2020-11-27 VITALS — WEIGHT: 135 LBS | BODY MASS INDEX: 23.92 KG/M2 | HEIGHT: 63 IN

## 2020-11-27 DIAGNOSIS — G43.809 OTHER MIGRAINE WITHOUT STATUS MIGRAINOSUS, NOT INTRACTABLE: Primary | ICD-10-CM

## 2020-11-27 DIAGNOSIS — G25.81 RESTLESS LEGS SYNDROME (RLS): ICD-10-CM

## 2020-11-27 LAB
FERRITIN SERPL-MCNC: 104 NG/ML (ref 10–130)
TSH SERPL DL<=0.005 MIU/L-ACNC: 2.56 UIU/ML (ref 0.3–5)
VIT B12 SERPL-MCNC: 487 PG/ML (ref 213–816)

## 2020-11-27 PROCEDURE — 99203 OFFICE O/P NEW LOW 30 MIN: CPT | Mod: TEL | Performed by: PSYCHIATRY & NEUROLOGY

## 2020-11-27 RX ORDER — DEXAMETHASONE 4 MG/1
TABLET ORAL
COMMUNITY
Start: 2020-10-07

## 2020-11-27 RX ORDER — LANSOPRAZOLE 30 MG/1
CAPSULE, DELAYED RELEASE ORAL
Status: ON HOLD | COMMUNITY
Start: 2019-10-31 | End: 2021-02-02

## 2020-11-27 RX ORDER — GABAPENTIN 300 MG/1
300 CAPSULE ORAL AT BEDTIME
Qty: 90 CAPSULE | Refills: 0 | Status: ON HOLD | OUTPATIENT
Start: 2020-11-27 | End: 2021-02-02

## 2020-11-27 RX ORDER — RIZATRIPTAN BENZOATE 10 MG/1
10 TABLET ORAL
COMMUNITY

## 2020-11-27 ASSESSMENT — MIFFLIN-ST. JEOR: SCORE: 1151.49

## 2020-11-27 NOTE — LETTER
11/27/2020         RE: Selam Rene  [address]  [place] [address]        Dear Colleague,    Thank you for referring your patient, Selam Rene, to the [facility] NEUROLOG[facility]IC [place]. Please see a copy of my visit note below.    NEUROLOGY OUTPATIENT CONSULT NOTE (TELEPHONE)  Nov 27, 2020     CHIEF COMPLAINT/REASON FOR VISIT/REASON FOR CONSULT  Patient presents with:  Consult: Consult for headaches. Pt states she has had migraines for a long time. Typically uses Maxalt, but was wanting to discuss other options/  Phone visit: [phone]. Pt does not have access to computer or smart phone.    REASON FOR CONSULTATION- Headaches    REFERRAL SOURCE  Dr. Ayse Basurto  CC Dr. Ayse Basurto      This is a telephone visit note  Visit was held in lieu of off a clinic appointment due to COVID 19 restrictions  Consent was obtained from the patient to have this billable telephone visit.      HISTORY OF PRESENT ILLNESS  Selam Rene is a 60 year old female seen today for evaluation of headaches.  Patient has had headaches since 8 years of age.  Previously they were diagnosed as sinus headaches though in 2000 she was diagnosed with migraine headaches.  She was put on Maxalt and that has been working very well for her.  She tried Imitrex in the past which did not help.  Maxalt does not work if she takes it too late.  She is currently over the last 6 months has slightly worsening of headaches.  They are happening 2-3 times a week.  Lack of sleep can be a trigger for her.  She does have restless leg which bothers her about 3 nights a week.  She tried Mirapex which caused some nausea and throwing up.  Lack of sleep from restless leg is what makes her headaches worse.  She has never tried any preventive medications.  Headaches about 10 to 15 days a month.    Headaches are more pulsating in nature.  More on the right side than the left.  There is photophobia and phonophobia and nausea  with the headaches.  Nuys any visual auras.  Reports no other associated symptoms.    Previous history is reviewed and this is unchanged.    PAST MEDICAL/SURGICAL HISTORY  Past Medical History:   Diagnosis Date     Esophageal reflux      Malignant neoplasm of breast (female), unspecified site     chemo/radiation     Mild intermittent asthma      Restless legs syndrome (RLS)      Patient Active Problem List   Diagnosis     Malignant neoplasm of female breast (H)     Mild intermittent asthma     Esophageal reflux     Migraine headache   Significant for throat cancer, migraine, restless leg    FAMILY HISTORY  Family History   Problem Relation Age of Onset     Chronic Obstructive Pulmonary Disease Mother      Osteoporosis Mother      Anxiety Disorder Mother      Depression Mother      Alcoholism Mother      Leukemia Maternal Grandmother      Stomach Cancer Maternal Grandfather      Diabetes Daughter      Depression Daughter      Breast Cancer Maternal Aunt      Breast Cancer Paternal Aunt      Brain Cancer Maternal Cousin      Rectal Cancer Maternal Cousin      Throat cancer Paternal Cousin      Brain Cancer Paternal Cousin    Brother with migraines    SOCIAL HISTORY  Social History     Tobacco Use     Smoking status: Never Smoker     Smokeless tobacco: Never Used   Substance Use Topics     Alcohol use: Yes     Frequency: 2-4 times a month     Drug use: No       SYSTEMS REVIEW  Twelve-system ROS was done and other than the HPI this was negative.     MEDICATIONS       ALBUTEROL IN, prn       FLOVENT  MCG/ACT inhaler, INHALE TWO PUFFS BY MOUTH DAILY       LANsoprazole (PREVACID) 30 MG DR capsule, Take 1 capsule by mouth daily.       PROAIR  (90 BASE) MCG/ACT IN AERS, INHALE 1 TO 2 PUFFS EVERY 4 TO 6 HOURS AS NEEDED       rizatriptan (MAXALT) 10 MG tablet, Take 10 mg by mouth       ADVAIR DISKUS 250-50 MCG/DOSE IN MISC, INHALE ONE PUFF TWICE DAILY (Patient not taking: No sig reported)       ammonium lactate  "(LAC-HYDRIN) 12 % cream, Apply topically 2 times daily as needed for dry skin (Patient not taking: Reported on 11/27/2020)       ARIMIDEX 1 MG OR TABS, 1 TABLET DAILY       MACROBID 100 MG OR CAPS, ONE CAPSULE TWICE DAILY (Patient not taking: No sig reported)       MIRAPEX OR, 1 TABLET 3 TIMES DAILY       MOBIC OR, 1 TABLET DAILY       PREDNISONE 20 MG OR TABS, 3 tabs daily for 3 days than 2 tabs for 3 days than 1 tab for 3 days (Patient not taking: No sig reported)       PROTONIX OR, 1 TABLET DAILY    No current facility-administered medications on file prior to visit.        PHYSICAL EXAMINATION  VITALS: Ht 1.6 m (5' 3\")   Wt 61.2 kg (135 lb)   BMI 23.91 kg/m    Exam  Patient is awake and oriented. Attention span is normal. Memory is grossly intact. Language is fluent and follows commands appropriately. Appropriate fund of knowledge.  Limited exam due to COVID 19 restrictions and not being able to see the patient face-to-face.      DIAGNOSTICS  MRI 2018  CONCLUSION:  1.  No acute intracranial abnormality. No evidence of acute infarct, mass, or  hemorrhage.    2.  There has been slight interval increase in amount of periventricular FLAIR  hyperintensity when compared to 06/10/2013. This could relate to progression of  sequela of chronic microvascular ischemic change.      OUTSIDE RECORDS  Outside referral notes were reviewed and pertinent information has been summarized:-Patient was last seen on November 11, 2020.  Has a previous history of migraines.  Has not seen neurology for several years.  Has 5 headaches a month.  She states that she has to write it out.  She will occasionally try Sudafed if she is feeling congested.  Maxalt works if she takes it soon enough.    IMPRESSION/REPORT/PLAN  Other migraine without status migrainosus, not intractable  Restless legs syndrome (RLS)    This is a 60 year old female with episodic migraines exacerbated by insomnia due to restless leg syndrome.  I would start her on " gabapentin to see if that would help with her symptoms.  She can continue using Maxalt for abortive therapy.  It should make the Maxalt more effective.  We also check a ferritin to evaluate for other causes of restless leg syndrome.  We will check the blood work for causes of migraines.  Imaging studies were negative for any structural cause of migraines.  I can see her back in about 6 weeks.    -     Vitamin B12  -     TSH with free T4 reflex  -     Methylmalonic Acid  -     Lyme Disease Saadia with reflex to WB Serum  -     Ferritin  -     gabapentin (NEURONTIN) 300 MG capsule; Take 1 capsule (300 mg) by mouth At Bedtime  -     Ferritin    Return in about 6 weeks (around 2021) for In-Clinic Visit.    Over 45 minutes were spent coordinating the care for the patient today more than 25 minutes were spent on the phone majority of the time was spent counseling on the phone.   Billin data points, 4 problem points    Cesario Dale MD  Neurologist  [facility] Neurology [facility]  Tel:- [phone]    This note was dictated using voice recognition software.  Any grammatical or context distortions are unintentional and inherent to the software.        Again, thank you for allowing me to participate in the care of your patient.        Sincerely,        Cesario Dale MD

## 2020-11-27 NOTE — PROGRESS NOTES
NEUROLOGY OUTPATIENT CONSULT NOTE (TELEPHONE)  Nov 27, 2020     CHIEF COMPLAINT/REASON FOR VISIT/REASON FOR CONSULT  Patient presents with:  Consult: Consult for headaches. Pt states she has had migraines for a long time. Typically uses Maxalt, but was wanting to discuss other options/  Phone visit: [phone]. Pt does not have access to computer or smart phone.    REASON FOR CONSULTATION- Headaches    REFERRAL SOURCE  Dr. Ayse Basurto  CC Dr. Ayse Basurto      This is a telephone visit note  Visit was held in lieu of off a clinic appointment due to COVID 19 restrictions  Consent was obtained from the patient to have this billable telephone visit.      HISTORY OF PRESENT ILLNESS  Selam Rene is a 60 year old female seen today for evaluation of headaches.  Patient has had headaches since 8 years of age.  Previously they were diagnosed as sinus headaches though in 2000 she was diagnosed with migraine headaches.  She was put on Maxalt and that has been working very well for her.  She tried Imitrex in the past which did not help.  Maxalt does not work if she takes it too late.  She is currently over the last 6 months has slightly worsening of headaches.  They are happening 2-3 times a week.  Lack of sleep can be a trigger for her.  She does have restless leg which bothers her about 3 nights a week.  She tried Mirapex which caused some nausea and throwing up.  Lack of sleep from restless leg is what makes her headaches worse.  She has never tried any preventive medications.  Headaches about 10 to 15 days a month.    Headaches are more pulsating in nature.  More on the right side than the left.  There is photophobia and phonophobia and nausea with the headaches.  Nuys any visual auras.  Reports no other associated symptoms.    Previous history is reviewed and this is unchanged.    PAST MEDICAL/SURGICAL HISTORY  Past Medical History:   Diagnosis Date     Esophageal reflux      Malignant neoplasm of breast  (female), unspecified site     chemo/radiation     Mild intermittent asthma      Restless legs syndrome (RLS)      Patient Active Problem List   Diagnosis     Malignant neoplasm of female breast (H)     Mild intermittent asthma     Esophageal reflux     Migraine headache   Significant for throat cancer, migraine, restless leg    FAMILY HISTORY  Family History   Problem Relation Age of Onset     Chronic Obstructive Pulmonary Disease Mother      Osteoporosis Mother      Anxiety Disorder Mother      Depression Mother      Alcoholism Mother      Leukemia Maternal Grandmother      Stomach Cancer Maternal Grandfather      Diabetes Daughter      Depression Daughter      Breast Cancer Maternal Aunt      Breast Cancer Paternal Aunt      Brain Cancer Maternal Cousin      Rectal Cancer Maternal Cousin      Throat cancer Paternal Cousin      Brain Cancer Paternal Cousin    Brother with migraines    SOCIAL HISTORY  Social History     Tobacco Use     Smoking status: Never Smoker     Smokeless tobacco: Never Used   Substance Use Topics     Alcohol use: Yes     Frequency: 2-4 times a month     Drug use: No       SYSTEMS REVIEW  Twelve-system ROS was done and other than the HPI this was negative.     MEDICATIONS       ALBUTEROL IN, prn       FLOVENT  MCG/ACT inhaler, INHALE TWO PUFFS BY MOUTH DAILY       LANsoprazole (PREVACID) 30 MG DR capsule, Take 1 capsule by mouth daily.       PROAIR  (90 BASE) MCG/ACT IN AERS, INHALE 1 TO 2 PUFFS EVERY 4 TO 6 HOURS AS NEEDED       rizatriptan (MAXALT) 10 MG tablet, Take 10 mg by mouth       ADVAIR DISKUS 250-50 MCG/DOSE IN MISC, INHALE ONE PUFF TWICE DAILY (Patient not taking: No sig reported)       ammonium lactate (LAC-HYDRIN) 12 % cream, Apply topically 2 times daily as needed for dry skin (Patient not taking: Reported on 11/27/2020)       ARIMIDEX 1 MG OR TABS, 1 TABLET DAILY       MACROBID 100 MG OR CAPS, ONE CAPSULE TWICE DAILY (Patient not taking: No sig reported)        "MIRAPEX OR, 1 TABLET 3 TIMES DAILY       MOBIC OR, 1 TABLET DAILY       PREDNISONE 20 MG OR TABS, 3 tabs daily for 3 days than 2 tabs for 3 days than 1 tab for 3 days (Patient not taking: No sig reported)       PROTONIX OR, 1 TABLET DAILY    No current facility-administered medications on file prior to visit.        PHYSICAL EXAMINATION  VITALS: Ht 1.6 m (5' 3\")   Wt 61.2 kg (135 lb)   BMI 23.91 kg/m    Exam  Patient is awake and oriented. Attention span is normal. Memory is grossly intact. Language is fluent and follows commands appropriately. Appropriate fund of knowledge.  Limited exam due to COVID 19 restrictions and not being able to see the patient face-to-face.      DIAGNOSTICS  MRI 2018  CONCLUSION:  1.  No acute intracranial abnormality. No evidence of acute infarct, mass, or  hemorrhage.    2.  There has been slight interval increase in amount of periventricular FLAIR  hyperintensity when compared to 06/10/2013. This could relate to progression of  sequela of chronic microvascular ischemic change.      OUTSIDE RECORDS  Outside referral notes were reviewed and pertinent information has been summarized:-Patient was last seen on November 11, 2020.  Has a previous history of migraines.  Has not seen neurology for several years.  Has 5 headaches a month.  She states that she has to write it out.  She will occasionally try Sudafed if she is feeling congested.  Maxalt works if she takes it soon enough.    IMPRESSION/REPORT/PLAN  Other migraine without status migrainosus, not intractable  Restless legs syndrome (RLS)    This is a 60 year old female with episodic migraines exacerbated by insomnia due to restless leg syndrome.  I would start her on gabapentin to see if that would help with her symptoms.  She can continue using Maxalt for abortive therapy.  It should make the Maxalt more effective.  We also check a ferritin to evaluate for other causes of restless leg syndrome.  We will check the blood work for " causes of migraines.  Imaging studies were negative for any structural cause of migraines.  I can see her back in about 6 weeks.    -     Vitamin B12  -     TSH with free T4 reflex  -     Methylmalonic Acid  -     Lyme Disease Saadia with reflex to WB Serum  -     Ferritin  -     gabapentin (NEURONTIN) 300 MG capsule; Take 1 capsule (300 mg) by mouth At Bedtime  -     Ferritin    Return in about 6 weeks (around 2021) for In-Clinic Visit.    Over 45 minutes were spent coordinating the care for the patient today more than 25 minutes were spent on the phone majority of the time was spent counseling on the phone.   Billin data points, 4 problem points    Cesario Dale MD  Neurologist  [facility]  Tel:- [phone]    This note was dictated using voice recognition software.  Any grammatical or context distortions are unintentional and inherent to the software.

## 2020-11-27 NOTE — NURSING NOTE
Chief Complaint   Patient presents with     Consult     Consult for headaches. Pt states she has had migraines for a long time. Typically uses Maxalt, but was wanting to discuss other options/     Phone visit     [phone]. Pt does not have access to computer or smart phone.     Shani West LPN on 11/27/2020 at 11:12 AM

## 2020-11-30 LAB
B BURGDOR IGG+IGM SER QL: 0.89 INDEX VALUE
METHYLMALONATE SERPL-SCNC: 0.2 UMOL/L (ref 0–0.4)

## 2020-12-06 ENCOUNTER — COMMUNICATION - HEALTHEAST (OUTPATIENT)
Dept: FAMILY MEDICINE | Facility: CLINIC | Age: 60
End: 2020-12-06

## 2020-12-06 DIAGNOSIS — K21.9 GASTROESOPHAGEAL REFLUX DISEASE WITHOUT ESOPHAGITIS: ICD-10-CM

## 2021-01-05 ENCOUNTER — COMMUNICATION - HEALTHEAST (OUTPATIENT)
Dept: ADMINISTRATIVE | Facility: CLINIC | Age: 61
End: 2021-01-05

## 2021-01-05 DIAGNOSIS — J45.909 UNCOMPLICATED ASTHMA, UNSPECIFIED ASTHMA SEVERITY, UNSPECIFIED WHETHER PERSISTENT: ICD-10-CM

## 2021-01-14 ENCOUNTER — SURGERY - HEALTHEAST (OUTPATIENT)
Dept: PODIATRY | Facility: CLINIC | Age: 61
End: 2021-01-14

## 2021-01-14 ENCOUNTER — AMBULATORY - HEALTHEAST (OUTPATIENT)
Dept: PODIATRY | Facility: CLINIC | Age: 61
End: 2021-01-14

## 2021-01-14 ENCOUNTER — COMMUNICATION - HEALTHEAST (OUTPATIENT)
Dept: PODIATRY | Facility: CLINIC | Age: 61
End: 2021-01-14

## 2021-01-14 DIAGNOSIS — M89.30 HYPERTROPHY OF BONE: ICD-10-CM

## 2021-01-15 ENCOUNTER — OFFICE VISIT - HEALTHEAST (OUTPATIENT)
Dept: INTERNAL MEDICINE | Facility: CLINIC | Age: 61
End: 2021-01-15

## 2021-01-15 ENCOUNTER — HEALTH MAINTENANCE LETTER (OUTPATIENT)
Age: 61
End: 2021-01-15

## 2021-01-15 DIAGNOSIS — R59.1 LYMPHADENOPATHY: ICD-10-CM

## 2021-01-15 DIAGNOSIS — F32.5 MAJOR DEPRESSIVE DISORDER WITH SINGLE EPISODE, IN FULL REMISSION (H): ICD-10-CM

## 2021-01-15 DIAGNOSIS — C06.9: ICD-10-CM

## 2021-01-15 ASSESSMENT — MIFFLIN-ST. JEOR: SCORE: 1163.49

## 2021-01-16 ENCOUNTER — OFFICE VISIT - HEALTHEAST (OUTPATIENT)
Dept: FAMILY MEDICINE | Facility: CLINIC | Age: 61
End: 2021-01-16

## 2021-01-16 DIAGNOSIS — Z85.3 HX: BREAST CANCER: ICD-10-CM

## 2021-01-16 DIAGNOSIS — R30.0 DYSURIA: ICD-10-CM

## 2021-01-16 DIAGNOSIS — N39.0 URINARY TRACT INFECTION IN FEMALE: ICD-10-CM

## 2021-01-16 LAB
ALBUMIN UR-MCNC: NEGATIVE MG/DL
AMORPH CRY #/AREA URNS HPF: ABNORMAL /[HPF]
APPEARANCE UR: ABNORMAL
BACTERIA #/AREA URNS HPF: ABNORMAL HPF
BILIRUB UR QL STRIP: NEGATIVE
COLOR UR AUTO: YELLOW
GLUCOSE UR STRIP-MCNC: NEGATIVE MG/DL
HGB UR QL STRIP: ABNORMAL
KETONES UR STRIP-MCNC: NEGATIVE MG/DL
LEUKOCYTE ESTERASE UR QL STRIP: ABNORMAL
NITRATE UR QL: NEGATIVE
PH UR STRIP: 7 [PH] (ref 5–8)
RBC #/AREA URNS AUTO: ABNORMAL HPF
SP GR UR STRIP: 1.02 (ref 1–1.03)
SQUAMOUS #/AREA URNS AUTO: ABNORMAL LPF
UROBILINOGEN UR STRIP-ACNC: ABNORMAL
WBC #/AREA URNS AUTO: ABNORMAL HPF

## 2021-01-18 ENCOUNTER — RECORDS - HEALTHEAST (OUTPATIENT)
Dept: ADMINISTRATIVE | Facility: OTHER | Age: 61
End: 2021-01-18

## 2021-01-18 ENCOUNTER — COMMUNICATION - HEALTHEAST (OUTPATIENT)
Dept: SCHEDULING | Facility: CLINIC | Age: 61
End: 2021-01-18

## 2021-01-18 LAB — BACTERIA SPEC CULT: ABNORMAL

## 2021-01-19 ENCOUNTER — SURGERY - HEALTHEAST (OUTPATIENT)
Dept: PODIATRY | Facility: CLINIC | Age: 61
End: 2021-01-19

## 2021-01-19 ENCOUNTER — AMBULATORY - HEALTHEAST (OUTPATIENT)
Dept: PODIATRY | Facility: CLINIC | Age: 61
End: 2021-01-19

## 2021-01-19 ENCOUNTER — COMMUNICATION - HEALTHEAST (OUTPATIENT)
Dept: INTERNAL MEDICINE | Facility: CLINIC | Age: 61
End: 2021-01-19

## 2021-01-20 ENCOUNTER — AMBULATORY - HEALTHEAST (OUTPATIENT)
Dept: SURGERY | Facility: AMBULATORY SURGERY CENTER | Age: 61
End: 2021-01-20

## 2021-01-20 DIAGNOSIS — Z11.59 ENCOUNTER FOR SCREENING FOR OTHER VIRAL DISEASES: ICD-10-CM

## 2021-01-25 ENCOUNTER — OFFICE VISIT - HEALTHEAST (OUTPATIENT)
Dept: INTERNAL MEDICINE | Facility: CLINIC | Age: 61
End: 2021-01-25

## 2021-01-25 DIAGNOSIS — K21.00 GASTROESOPHAGEAL REFLUX DISEASE WITH ESOPHAGITIS WITHOUT HEMORRHAGE: ICD-10-CM

## 2021-01-25 DIAGNOSIS — E78.00 ELEVATED LDL CHOLESTEROL LEVEL: ICD-10-CM

## 2021-01-25 DIAGNOSIS — J45.20 MILD INTERMITTENT ASTHMA, UNSPECIFIED WHETHER COMPLICATED: ICD-10-CM

## 2021-01-25 DIAGNOSIS — R03.0 ELEVATED BLOOD PRESSURE READING WITHOUT DIAGNOSIS OF HYPERTENSION: ICD-10-CM

## 2021-01-25 DIAGNOSIS — Z01.818 PREOPERATIVE EXAMINATION: ICD-10-CM

## 2021-01-25 ASSESSMENT — MIFFLIN-ST. JEOR: SCORE: 1180.27

## 2021-01-25 ASSESSMENT — PATIENT HEALTH QUESTIONNAIRE - PHQ9: SUM OF ALL RESPONSES TO PHQ QUESTIONS 1-9: 2

## 2021-01-27 ENCOUNTER — COMMUNICATION - HEALTHEAST (OUTPATIENT)
Dept: PODIATRY | Facility: CLINIC | Age: 61
End: 2021-01-27

## 2021-01-27 ENCOUNTER — NURSE TRIAGE (OUTPATIENT)
Dept: NURSING | Facility: CLINIC | Age: 61
End: 2021-01-27

## 2021-01-28 ENCOUNTER — OFFICE VISIT (OUTPATIENT)
Dept: PODIATRY | Facility: CLINIC | Age: 61
End: 2021-01-28
Payer: COMMERCIAL

## 2021-01-28 ENCOUNTER — TELEPHONE (OUTPATIENT)
Dept: PODIATRY | Facility: CLINIC | Age: 61
End: 2021-01-28

## 2021-01-28 VITALS
WEIGHT: 140 LBS | BODY MASS INDEX: 23.9 KG/M2 | DIASTOLIC BLOOD PRESSURE: 80 MMHG | HEIGHT: 64 IN | SYSTOLIC BLOOD PRESSURE: 118 MMHG

## 2021-01-28 DIAGNOSIS — M79.675 TOE PAIN, LEFT: Primary | ICD-10-CM

## 2021-01-28 DIAGNOSIS — Z11.59 ENCOUNTER FOR SCREENING FOR OTHER VIRAL DISEASES: Primary | ICD-10-CM

## 2021-01-28 PROCEDURE — 99213 OFFICE O/P EST LOW 20 MIN: CPT | Performed by: PODIATRIST

## 2021-01-28 ASSESSMENT — MIFFLIN-ST. JEOR: SCORE: 1177.1

## 2021-01-28 NOTE — LETTER
2021         RE: Selam Rene  [address]  [place] [address]        Dear Colleague,    Thank you for referring your patient, Selam Rene, to the [facility]. Please see a copy of my visit note below.    ASSESSMENT:  Encounter Diagnosis   Name Primary?     fifth Toe pain, left Yes     MEDICAL DECISION MAKING:  I reviewed previous podiatry documentation.  I personally reviewed the previous x-ray images with Selam.  She is requesting that I performed the surgery that was planned with Dr. Weiss.  He is currently on leave.  I am not sure what procedure was planned.  After reviewing the x-rays and her toe clinically, I am anticipating removal of the base of the distal phalanx/arthroplasty with possible derotational skin plasty.  The callus on the toe is a pinch callus from the fifth toe riding underneath the fourth.  I explained there are no guarantees with surgery.  Her only goal is pain reduction.  I reviewed the surgical procedure and the postoperative course.\discussed the risk of infection, ongoing pain, and weeks to months of postoperative swelling.    I will place a case request for her surgical procedures.  (arthroplasty of left fifth toe with possible deratotation skin plasty)    Disclaimer: This note consists of symbols derived from keyboarding, dictation and/or voice recognition software. As a result, there may be errors in the script that have gone undetected. Please consider this when interpreting information found in this chart.    Ed Tello DPM, FACFAS, [facility] Department of Podiatry/Foot & Ankle Surgery      ____________________________________________________________________    HPI:         Chief Complaint: burning, painful baby toe  Onset of problem: 5 years  Pain/ discomfort is described as:  Burning, throbbing  Pain Ratin/10   Frequency:  intermittent    The pain is exacerbated by prolonged weight bearing and certain  shoes  Previous treatment: wider shoe  She had surgery scheduled with Dr. Weiss. This was cancelled.   *  Past Medical History:   Diagnosis Date     Esophageal reflux      Malignant neoplasm of breast (female), unspecified site     chemo/radiation     Mild intermittent asthma      Restless legs syndrome (RLS)    *  *  Past Surgical History:   Procedure Laterality Date     colon ressection       COLONOSCOPY       ENT SURGERY      tonsilectomy     GYN SURGERY      hysterectomy     MASTECTOMY, BILATERAL      25 surgeries with reconstruction     REPAIR PTOSIS BILATERAL Bilateral 12/1/2017    Procedure: REPAIR PTOSIS BILATERAL;  BILATERAL UPPER LID PTOSIS AND MECHANICAL PTOSIS REPAIR;  Surgeon: Lawrence West MD;  Location: [facility]     petey moreno     *  *  Current Outpatient Medications   Medication Sig Dispense Refill     ALBUTEROL IN prn       ARIMIDEX 1 MG OR TABS 1 TABLET DAILY       FLOVENT  MCG/ACT inhaler INHALE TWO PUFFS BY MOUTH DAILY       gabapentin (NEURONTIN) 300 MG capsule Take 1 capsule (300 mg) by mouth At Bedtime 90 capsule 0     LANsoprazole (PREVACID) 30 MG DR capsule Take 1 capsule by mouth daily.       MIRAPEX OR 1 TABLET 3 TIMES DAILY       MOBIC OR 1 TABLET DAILY       PROAIR  (90 BASE) MCG/ACT IN AERS INHALE 1 TO 2 PUFFS EVERY 4 TO 6 HOURS AS NEEDED 1 1 YEAR     PROTONIX OR 1 TABLET DAILY       rizatriptan (MAXALT) 10 MG tablet Take 10 mg by mouth       ADVAIR DISKUS 250-50 MCG/DOSE IN MISC INHALE ONE PUFF TWICE DAILY (Patient not taking: No sig reported) 1 1     ammonium lactate (LAC-HYDRIN) 12 % cream Apply topically 2 times daily as needed for dry skin (Patient not taking: Reported on 11/27/2020) 385 g 3     MACROBID 100 MG OR CAPS ONE CAPSULE TWICE DAILY (Patient not taking: No sig reported) 14 0     PREDNISONE 20 MG OR TABS 3 tabs daily for 3 days than 2 tabs for 3 days than 1 tab for 3 days (Patient not taking: No sig reported) qs 0         EXAM:    Vitals: Ht 1.613 m (5'  "3.5\")   Wt 63.5 kg (140 lb)   BMI 24.41 kg/m    BMI: Body mass index is 24.41 kg/m .    Constitutional:  Selam Rene is in no apparent distress, appears well-nourished.  Cooperative with history and physical exam.    Vascular:  Pedal pulses are palpable for both the DP and PT arteries.  CFT < 3 sec.  No edema.      Neuro: Light touch sensation is intact to the L4, L5, S1 distributions  No evidence of weakness, spasticity, or contracture in the lower extremities.     Derm: Normal texture and turgor.  No erythema, ecchymosis, or cyanosis.  No open lesions. There is a painful callus at the distal medial left fifth toe.     Musculoskeletal:    Lower extremity muscle strength is normal. No gross deformities.  Pain on palpation: distal medial left fifth toe over a callus.  The toe does not appear to have much varus rotation. It has a slight dorsal drift.       X-Ray Findings:    She has 2 phalanges in the left fifth toe.  A prior x-ray with a BB overlying the painful region, suggest her pain is near the base of the distal phalanx.  This is likely related to skin being squeezed between the widening of the metaphysis of the left fifth toe interphalangeal joint and the widening of the left fourth toe proximal interphalangeal joint.    XR FOOT LT G/E 3 VW 4/7/2016 10:19 AM     HISTORY: Corns and callosities, Pain in left toe(s), Acquired  deformities of toe(s), unspecified, left foot              IMPRESSION: Foreign body projected in the soft tissues of the fifth  toe. Clinical correlation recommended. No other findings.     JOE DAIGLE MD            Again, thank you for allowing me to participate in the care of your patient.        Sincerely,        Ed Tello, TITA    "

## 2021-01-28 NOTE — PATIENT INSTRUCTIONS
Thank you for choosing [facility] Podiatry / Foot & Ankle Surgery!    DR. PAVON'S CLINIC LOCATIONS     [facility] SCHEDULE SURGERY: [phone]   [address] [place] [address] APPOINTMENTS: [phone]   [place] [address] BILLING QUESTIONS: [phone]  [phone] RADIOLOGY: [phone]       [place]    [address]    [place] [address]    [phone]  [phone]        REVIEW OF SURGICAL RISKS  The following is a list of possible risks associated with foot and ankle surgery. This is not all-inclusive. Please realize that risks associated with elective foot surgery are low and there are ways to deal with them when they occur.     1) Infection:  Probably the most concerning and discussed risk of surgery, the chance of infection is about 1% with elective surgery. Often it involves the skin around the incision and resolves with oral antibiotics. It is rare that infection will be deep and require surgical intervention. You will receive an antibiotic prior to surgery.    2)  Pain: Surgery is trauma to the foot. Therefore a certain amount of pain is to be expected. This will be most bothersome during the first 1-2 days. Taking your pain medication, elevating the extremity above heart level, and using ice are all very important for adequate pain management.     3)  Swelling: This is due to the trauma of surgery. A certain degree of swelling is normal. However, if there is too much, it can impair healing, increase the risk of infection, add to your pain, and linger for several months after surgery making return to normal shoes difficult. Elevating the limb is extremely important.    4)  Healing Complications: There are many factors that can impair healing. There is no way to speed up the biological rate of healing. People tend to find ways to slow it down. Proper nutrition, not smoking, following the instructions provided by your surgeon are ways to help with normal  healing.    Sometimes the skin is slow to heal, and an open area along the incision develops.  If this happens, it cannot be stitched closed. It then needs to heal from the inside out. Rarely there will be an issue with bone healing, which might require a special device called a bone stimulator and/or a revision surgery.    5)  Temporary and Permanent Numbness: Numbness beyond the area of surgery is to be expected. Initially it is from the local anesthetic that was injected into your foot. This wears off within 24 hours. Continued numbness or tingling is usually from swelling that compresses the nerves. Numbness that lasts for weeks is from nerve injury/irritation. This might eventually resolve or be permanent.      6)  Blood Clot:  Although rare, this is potentially the most dangerous risk associated with foot surgery. A blood clot in the leg can lead to varicose veins and chronic swelling. A blood clot that travels to the lungs is a very serious condition requiring hospitalization. Increased risk is related to trauma of surgery and degree of immobilization. There are many other risk factors that are not related directly to surgery (smoking, family history, personal history of varicose veins and/or blood clots, cancer, high fat cholesterol and lipids). Your surgeon will discuss this with you and devise an appropriate plan to help prevent this complication.    7)  Hypertrophic Scar: Some people have skin that is prone to forming exaggerated scar tissue. This can be unsightly and uncomfortable. There are ways to treat it.        8)  Complex Regional Pain Syndrome:  Rarely some people have pain that is out of proportion to the surgical procedure and harder to get control of. This pain can linger and cause changes in skin temperature and appearance. If this occurs, physical therapy and/or a pain specialist might be needed.      9) There are also intra-operative challenges and complications that can occur.    "Intra-operative challenges can involve finding poor bone quality, difficulty with the internal fixation (when used), and even inadvertent injury to neighboring structures that would then need to be repaired.     Please remember that surgical complications are rare. Your surgeon has a plan to deal with them, should one occur. The primary goal of surgery is pain reduction. There are no guarantees. An \"abnormal\" foot prior to surgery, is not necessarily a \"normal\" foot afterwards. Hopefully it is a less painful one.      If you have any questions, please discuss with Dr. Tello prior to surgery.        POST-OPERATIVE CARE RECOMENDATIONS    ACTIVITY:    1)  Weight bearing status: __________________________    2)  Keep your surgical lower extremity elevated 23/24 hours above heart level. You will want to keep your foot elevated as much as possible for the first week after surgery.     3)  It is recommended that you get up and move around (walk, crutch, roll) for short periods each hour while you are awake. It is okay to wiggle your toes. If you are not in a splint or cast, move your ankle joint. Motion helps lower risk of a blood clot in your leg.      4)  If you bathe or shower, the dressing must be kept dry. Safety is a concern and sponge bathing might be best. You can purchase a water proof cast protector.     5)  You are not to drive while you are taking pain medications. No driving, if surgery was done on the right foot/ ankle or if you drive a stick shift.     SPECIFIC CARES:    1)  Keep the dressing clean, dry, and intact. If your dressing gets wet, comes apart, or falls off, please call your clinic and notify Dr. Tello. Some bleeding through the dressing is okay. But if it causes a spot bigger than 2 inches in diameter, please notify Dr. Tello.     2)  Place an ice pack behind the knee of the surgical side for up to 20min/hour. It can also be placed on the front of the ankle.     4)  Call your clinic if you " experience calf pain, chest pain, or problems breathing.    5)  Call your clinic if fever, increasing pain that you can't control or a large amount of bleeding.      6) What to do if your pain seems out of control:   1)  Make sure you are truly elevating the foot/ankle above heart level.   2)  Make sure you are using a cold pack/ ice   3)  Check the pain medication instructions:     Usually you can take two pain pills every four hours, if needed.   4)  If the above are not adequate, then you need to remove the brace/ boot and   remove the compression wrap. The wrap might be too tight. After you do   this, elevate the foot for an hour and then re-wrap the foot lightly and   replace the splint / boot.      Follow up in clinic one week after surgery. This appointment should already be set up.      MEDICATIONS:    IMPORTANT:  Take your pain medication when you get home, even if you are not having pain. You want it in your system before the local anesthetic (foot numbness from the shot) wears off.      1)  Take your pain medication with food. This will help prevent any nausea side effects.  If hydroxyzine was prescribed, it is for itching, leg spasms, and makes the other pain medication work better.    2)  Anti blood clot plan: To help prevent a blood clot in your legs, it is important that you wiggle your toes and ankles frequently. Obviously, this might be limited on the surgical side. Get up and move around a few minutes every hour while you are awake.  Keep the white sock on the non-surgical side and the compression wraps to the knee on the surgical side. If Dr. Tello thinks that you are at high risk for a blood clot, he might put you on a blood thinner called enoxaparin.    Please call the clinic if you have any pain or swelling in either calf.        SHOWERING BEFORE SURGERY  You must wash with the soap (Hibiclens - 4% CHG) twice before coming to the hospital for your surgery. This will decrease bacteria (germs) on  your skin. It will also help reduce your chance of infection (illness caused by germs) after surgery.  Read the directions and safety tips on the bottle of soap. Wash once the evening before surgery and once the morning of surgery. Use 4 ounces of soap each time. When showering, it is best to use two fresh washcloths and a fresh towel.   Items you will need for showerin newly washed washcloths    2 newly washed towels    8 ounces of one of the soap  FOLLOW THESE INSTRUCTIONS:  The evening before surgery   1. Shower or bathe as you normally would, use your regular soap and a clean washcloth. Give special attention to places where your incision (surgical cut) or catheters will be. This includes your groin area. Rinse well. You may wash your hair with your regular shampoo.  2. Next, wash your entire body from your chin down with the antiseptic soap. See the next page for directions about how to do this.  3. Rinse well and dry off using a newly washed towel.  The morning of surgery  Repeat steps 1, 2 and 3.   Other suggestions:    Wear freshly washed pajamas or clothing after your evening shower.    Wear freshly washed clothes the day of surgery.    Wash and change your bed sheets the day before surgery to have clean bed sheets after you shower and when you get home from surgery.    If you have trouble washing all areas, make sure someone helps you.    Don t use any deodorant, lotion or powder after your shower.    Women who are menstruating should wear a fresh sanitary pad to the hospital.       **If you have questions or concerns after surgery, please call: Podiatry/Foot & Ankle Surgery Triage Team at the [facility] at [phone] (option 2 > option 3 for triage). If it is after 4:30PM you can reach a [facility] Nurse Advisor at [phone] or [phone] toll-free. They can then page the podiatrist on call, if needed. There is a [facility] podiatrist on call all of the time.**

## 2021-01-28 NOTE — PROGRESS NOTES
ASSESSMENT:  Encounter Diagnosis   Name Primary?     fifth Toe pain, left Yes     MEDICAL DECISION MAKING:  I reviewed previous podiatry documentation.  I personally reviewed the previous x-ray images with Selam.  She is requesting that I performed the surgery that was planned with Dr. Weiss.  He is currently on leave.  I am not sure what procedure was planned.  After reviewing the x-rays and her toe clinically, I am anticipating removal of the base of the distal phalanx/arthroplasty with possible derotational skin plasty.  The callus on the toe is a pinch callus from the fifth toe riding underneath the fourth.  I explained there are no guarantees with surgery.  Her only goal is pain reduction.  I reviewed the surgical procedure and the postoperative course.\  We discussed the risk of infection, ongoing pain, and weeks to months of postoperative swelling.    I will place a case request for her surgical procedures.  (arthroplasty of left fifth toe with possible deratotation skin plasty)    Disclaimer: This note consists of symbols derived from keyboarding, dictation and/or voice recognition software. As a result, there may be errors in the script that have gone undetected. Please consider this when interpreting information found in this chart.    Ed Tello DPM, FACFAS, MS    [place] [facility]      ____________________________________________________________________    HPI:         Chief Complaint: burning, painful baby toe  Onset of problem: 5 years  Pain/ discomfort is described as:  Burning, throbbing  Pain Ratin/10   Frequency:  intermittent    The pain is exacerbated by prolonged weight bearing and certain shoes  Previous treatment: wider shoe  She had surgery scheduled with Dr. Weiss. This was cancelled.   *  Past Medical History:   Diagnosis Date     Esophageal reflux      Malignant neoplasm of breast (female), unspecified site     chemo/radiation     Mild  "intermittent asthma      Restless legs syndrome (RLS)    *  *  Past Surgical History:   Procedure Laterality Date     colon ressection       COLONOSCOPY       ENT SURGERY      tonsilectomy     GYN SURGERY      hysterectomy     MASTECTOMY, BILATERAL      25 surgeries with reconstruction     REPAIR PTOSIS BILATERAL Bilateral 12/1/2017    Procedure: REPAIR PTOSIS BILATERAL;  BILATERAL UPPER LID PTOSIS AND MECHANICAL PTOSIS REPAIR;  Surgeon: Lawrence West MD;  [facility]     *  *  Current Outpatient Medications   Medication Sig Dispense Refill     ALBUTEROL IN prn       ARIMIDEX 1 MG OR TABS 1 TABLET DAILY       FLOVENT  MCG/ACT inhaler INHALE TWO PUFFS BY MOUTH DAILY       gabapentin (NEURONTIN) 300 MG capsule Take 1 capsule (300 mg) by mouth At Bedtime 90 capsule 0     LANsoprazole (PREVACID) 30 MG DR capsule Take 1 capsule by mouth daily.       MIRAPEX OR 1 TABLET 3 TIMES DAILY       MOBIC OR 1 TABLET DAILY       PROAIR  (90 BASE) MCG/ACT IN AERS INHALE 1 TO 2 PUFFS EVERY 4 TO 6 HOURS AS NEEDED 1 1 YEAR     PROTONIX OR 1 TABLET DAILY       rizatriptan (MAXALT) 10 MG tablet Take 10 mg by mouth       ADVAIR DISKUS 250-50 MCG/DOSE IN MISC INHALE ONE PUFF TWICE DAILY (Patient not taking: No sig reported) 1 1     ammonium lactate (LAC-HYDRIN) 12 % cream Apply topically 2 times daily as needed for dry skin (Patient not taking: Reported on 11/27/2020) 385 g 3     MACROBID 100 MG OR CAPS ONE CAPSULE TWICE DAILY (Patient not taking: No sig reported) 14 0     PREDNISONE 20 MG OR TABS 3 tabs daily for 3 days than 2 tabs for 3 days than 1 tab for 3 days (Patient not taking: No sig reported) qs 0         EXAM:    Vitals: Ht 1.613 m (5' 3.5\")   Wt 63.5 kg (140 lb)   BMI 24.41 kg/m    BMI: Body mass index is 24.41 kg/m .    Constitutional:  Selam Rene is in no apparent distress, appears well-nourished.  Cooperative with history and physical exam.    Vascular:  Pedal pulses are " palpable for both the DP and PT arteries.  CFT < 3 sec.  No edema.      Neuro: Light touch sensation is intact to the L4, L5, S1 distributions  No evidence of weakness, spasticity, or contracture in the lower extremities.     Derm: Normal texture and turgor.  No erythema, ecchymosis, or cyanosis.  No open lesions. There is a painful callus at the distal medial left fifth toe.     Musculoskeletal:    Lower extremity muscle strength is normal. No gross deformities.  Pain on palpation: distal medial left fifth toe over a callus.  The toe does not appear to have much varus rotation. It has a slight dorsal drift.       X-Ray Findings:    She has 2 phalanges in the left fifth toe.  A prior x-ray with a BB overlying the painful region, suggest her pain is near the base of the distal phalanx.  This is likely related to skin being squeezed between the widening of the metaphysis of the left fifth toe interphalangeal joint and the widening of the left fourth toe proximal interphalangeal joint.    XR FOOT LT G/E 3 VW 4/7/2016 10:19 AM     HISTORY: Corns and callosities, Pain in left toe(s), Acquired  deformities of toe(s), unspecified, left foot              IMPRESSION: Foreign body projected in the soft tissues of the fifth  toe. Clinical correlation recommended. No other findings.     JOE DAIGLE MD

## 2021-01-28 NOTE — TELEPHONE ENCOUNTER
Scheduled surgery.     ATC: Patient getting covid test completed with [employer].  Please place covid order.     Type of surgery: left 5th toe arthroplasty  Location of surgery: [facility]  Date and time of surgery: [facility]2/21 [facility]   Surgeon: Omer  Pre-Op Appt Date: already completed with [employer]  Post-Op Appt Date: 2/10/21   Packet sent out: No  Pre-cert/Authorization completed:  No  Date: 1/28/21    Jim Keene Surgery Scheduler

## 2021-01-28 NOTE — TELEPHONE ENCOUNTER
Please place surgery order. Patient would like to move forward with surgery.       Jim Keene, Surgery Scheduler

## 2021-01-30 ENCOUNTER — AMBULATORY - HEALTHEAST (OUTPATIENT)
Dept: FAMILY MEDICINE | Facility: CLINIC | Age: 61
End: 2021-01-30

## 2021-01-30 DIAGNOSIS — Z11.59 ENCOUNTER FOR SCREENING FOR OTHER VIRAL DISEASES: ICD-10-CM

## 2021-01-31 LAB
SARS-COV-2 PCR COMMENT: NORMAL
SARS-COV-2 RNA SPEC QL NAA+PROBE: NEGATIVE
SARS-COV-2 VIRUS SPECIMEN SOURCE: NORMAL

## 2021-02-01 ENCOUNTER — COMMUNICATION - HEALTHEAST (OUTPATIENT)
Dept: SCHEDULING | Facility: CLINIC | Age: 61
End: 2021-02-01

## 2021-02-02 ENCOUNTER — ANESTHESIA (OUTPATIENT)
Dept: SURGERY | Facility: CLINIC | Age: 61
End: 2021-02-02
Payer: COMMERCIAL

## 2021-02-02 ENCOUNTER — APPOINTMENT (OUTPATIENT)
Dept: GENERAL RADIOLOGY | Facility: CLINIC | Age: 61
End: 2021-02-02
Attending: PODIATRIST
Payer: COMMERCIAL

## 2021-02-02 ENCOUNTER — HOSPITAL ENCOUNTER (OUTPATIENT)
Facility: CLINIC | Age: 61
Discharge: HOME | End: 2021-02-02
Attending: PODIATRIST | Admitting: PODIATRIST
Payer: COMMERCIAL

## 2021-02-02 ENCOUNTER — ANESTHESIA EVENT (OUTPATIENT)
Dept: SURGERY | Facility: CLINIC | Age: 61
End: 2021-02-02
Payer: COMMERCIAL

## 2021-02-02 VITALS
SYSTOLIC BLOOD PRESSURE: 136 MMHG | HEART RATE: 80 BPM | OXYGEN SATURATION: 95 % | WEIGHT: 139 LBS | HEIGHT: 64 IN | RESPIRATION RATE: 14 BRPM | BODY MASS INDEX: 23.73 KG/M2 | TEMPERATURE: 97.7 F | DIASTOLIC BLOOD PRESSURE: 91 MMHG

## 2021-02-02 DIAGNOSIS — T50.905A ITCHING DUE TO DRUG: ICD-10-CM

## 2021-02-02 DIAGNOSIS — G89.18 POST-OPERATIVE PAIN: Primary | ICD-10-CM

## 2021-02-02 DIAGNOSIS — M79.675 TOE PAIN, LEFT: ICD-10-CM

## 2021-02-02 DIAGNOSIS — L29.89 ITCHING DUE TO DRUG: ICD-10-CM

## 2021-02-02 PROCEDURE — 250N000011 HC RX IP 250 OP 636: Performed by: PODIATRIST

## 2021-02-02 PROCEDURE — 250N000009 HC RX 250: Performed by: PODIATRIST

## 2021-02-02 PROCEDURE — 710N000012 HC RECOVERY PHASE 2, PER MINUTE: Performed by: PODIATRIST

## 2021-02-02 PROCEDURE — 250N000011 HC RX IP 250 OP 636

## 2021-02-02 PROCEDURE — 250N000009 HC RX 250

## 2021-02-02 PROCEDURE — 250N000013 HC RX MED GY IP 250 OP 250 PS 637: Performed by: ANESTHESIOLOGY

## 2021-02-02 PROCEDURE — 28108 REMOVAL OF TOE LESIONS: CPT | Mod: T4 | Performed by: PODIATRIST

## 2021-02-02 PROCEDURE — 250N000025 HC SEVOFLURANE, PER MIN: Performed by: PODIATRIST

## 2021-02-02 PROCEDURE — 258N000003 HC RX IP 258 OP 636

## 2021-02-02 PROCEDURE — 360N000083 HC SURGERY LEVEL 3 W/ FLUORO, PER MIN: Performed by: PODIATRIST

## 2021-02-02 PROCEDURE — 999N000141 HC STATISTIC PRE-PROCEDURE NURSING ASSESSMENT: Performed by: PODIATRIST

## 2021-02-02 PROCEDURE — 370N000017 HC ANESTHESIA TECHNICAL FEE, PER MIN: Performed by: PODIATRIST

## 2021-02-02 PROCEDURE — 999N000179 XR SURGERY CARM FLUORO LESS THAN 5 MIN W STILLS

## 2021-02-02 PROCEDURE — 28160 PARTIAL REMOVAL OF TOE: CPT | Mod: T4 | Performed by: PODIATRIST

## 2021-02-02 PROCEDURE — 999N000063 XR FOOT PORT LT 3 VW: Mod: LT

## 2021-02-02 PROCEDURE — 272N000001 HC OR GENERAL SUPPLY STERILE: Performed by: PODIATRIST

## 2021-02-02 PROCEDURE — 710N000009 HC RECOVERY PHASE 1, LEVEL 1, PER MIN: Performed by: PODIATRIST

## 2021-02-02 RX ORDER — OXYCODONE HYDROCHLORIDE 5 MG/1
5 TABLET ORAL EVERY 6 HOURS PRN
Qty: 12 TABLET | Refills: 0 | Status: SHIPPED | OUTPATIENT
Start: 2021-02-02 | End: 2021-02-05

## 2021-02-02 RX ORDER — LANSOPRAZOLE 30 MG/1
30 CAPSULE, DELAYED RELEASE ORAL DAILY
COMMUNITY

## 2021-02-02 RX ORDER — LIDOCAINE HYDROCHLORIDE 20 MG/ML
INJECTION, SOLUTION INFILTRATION; PERINEURAL PRN
Status: DISCONTINUED | OUTPATIENT
Start: 2021-02-02 | End: 2021-02-02

## 2021-02-02 RX ORDER — BUPIVACAINE HYDROCHLORIDE 5 MG/ML
INJECTION, SOLUTION EPIDURAL; INTRACAUDAL
Status: DISCONTINUED
Start: 2021-02-02 | End: 2021-02-02 | Stop reason: HOSPADM

## 2021-02-02 RX ORDER — NALOXONE HYDROCHLORIDE 0.4 MG/ML
0.4 INJECTION, SOLUTION INTRAMUSCULAR; INTRAVENOUS; SUBCUTANEOUS
Status: DISCONTINUED | OUTPATIENT
Start: 2021-02-02 | End: 2021-02-02 | Stop reason: HOSPADM

## 2021-02-02 RX ORDER — DEXAMETHASONE SODIUM PHOSPHATE 4 MG/ML
INJECTION, SOLUTION INTRA-ARTICULAR; INTRALESIONAL; INTRAMUSCULAR; INTRAVENOUS; SOFT TISSUE PRN
Status: DISCONTINUED | OUTPATIENT
Start: 2021-02-02 | End: 2021-02-02

## 2021-02-02 RX ORDER — CEFAZOLIN SODIUM 2 G/100ML
2 INJECTION, SOLUTION INTRAVENOUS
Status: COMPLETED | OUTPATIENT
Start: 2021-02-02 | End: 2021-02-02

## 2021-02-02 RX ORDER — PROPOFOL 10 MG/ML
INJECTION, EMULSION INTRAVENOUS CONTINUOUS PRN
Status: DISCONTINUED | OUTPATIENT
Start: 2021-02-02 | End: 2021-02-02

## 2021-02-02 RX ORDER — ONDANSETRON 2 MG/ML
4 INJECTION INTRAMUSCULAR; INTRAVENOUS EVERY 30 MIN PRN
Status: DISCONTINUED | OUTPATIENT
Start: 2021-02-02 | End: 2021-02-02 | Stop reason: HOSPADM

## 2021-02-02 RX ORDER — BUPIVACAINE HYDROCHLORIDE 5 MG/ML
INJECTION, SOLUTION PERINEURAL PRN
Status: DISCONTINUED | OUTPATIENT
Start: 2021-02-02 | End: 2021-02-02 | Stop reason: HOSPADM

## 2021-02-02 RX ORDER — NALOXONE HYDROCHLORIDE 0.4 MG/ML
0.2 INJECTION, SOLUTION INTRAMUSCULAR; INTRAVENOUS; SUBCUTANEOUS
Status: DISCONTINUED | OUTPATIENT
Start: 2021-02-02 | End: 2021-02-02 | Stop reason: HOSPADM

## 2021-02-02 RX ORDER — OXYCODONE HYDROCHLORIDE 5 MG/1
5 TABLET ORAL ONCE
Status: COMPLETED | OUTPATIENT
Start: 2021-02-02 | End: 2021-02-02

## 2021-02-02 RX ORDER — ONDANSETRON 4 MG/1
4 TABLET, ORALLY DISINTEGRATING ORAL EVERY 30 MIN PRN
Status: DISCONTINUED | OUTPATIENT
Start: 2021-02-02 | End: 2021-02-02 | Stop reason: HOSPADM

## 2021-02-02 RX ORDER — FENTANYL CITRATE 50 UG/ML
25-50 INJECTION, SOLUTION INTRAMUSCULAR; INTRAVENOUS
Status: DISCONTINUED | OUTPATIENT
Start: 2021-02-02 | End: 2021-02-02 | Stop reason: HOSPADM

## 2021-02-02 RX ORDER — HYDROMORPHONE HYDROCHLORIDE 1 MG/ML
.3-.5 INJECTION, SOLUTION INTRAMUSCULAR; INTRAVENOUS; SUBCUTANEOUS EVERY 5 MIN PRN
Status: DISCONTINUED | OUTPATIENT
Start: 2021-02-02 | End: 2021-02-02 | Stop reason: HOSPADM

## 2021-02-02 RX ORDER — IBUPROFEN 600 MG/1
600 TABLET, FILM COATED ORAL EVERY 6 HOURS PRN
Qty: 28 TABLET | Refills: 0 | Status: SHIPPED | OUTPATIENT
Start: 2021-02-02

## 2021-02-02 RX ORDER — SODIUM CHLORIDE, SODIUM LACTATE, POTASSIUM CHLORIDE, CALCIUM CHLORIDE 600; 310; 30; 20 MG/100ML; MG/100ML; MG/100ML; MG/100ML
INJECTION, SOLUTION INTRAVENOUS CONTINUOUS PRN
Status: DISCONTINUED | OUTPATIENT
Start: 2021-02-02 | End: 2021-02-02

## 2021-02-02 RX ORDER — HYDROXYZINE HYDROCHLORIDE 25 MG/1
25 TABLET, FILM COATED ORAL 3 TIMES DAILY PRN
Qty: 30 TABLET | Refills: 0 | Status: SHIPPED | OUTPATIENT
Start: 2021-02-02

## 2021-02-02 RX ORDER — ACETAMINOPHEN 500 MG
500-1000 TABLET ORAL EVERY 8 HOURS PRN
Qty: 42 TABLET | Refills: 0 | Status: SHIPPED | OUTPATIENT
Start: 2021-02-02

## 2021-02-02 RX ORDER — ONDANSETRON 2 MG/ML
INJECTION INTRAMUSCULAR; INTRAVENOUS PRN
Status: DISCONTINUED | OUTPATIENT
Start: 2021-02-02 | End: 2021-02-02

## 2021-02-02 RX ORDER — CEFAZOLIN SODIUM 1 G/3ML
1 INJECTION, POWDER, FOR SOLUTION INTRAMUSCULAR; INTRAVENOUS SEE ADMIN INSTRUCTIONS
Status: DISCONTINUED | OUTPATIENT
Start: 2021-02-02 | End: 2021-02-02 | Stop reason: HOSPADM

## 2021-02-02 RX ORDER — FENTANYL CITRATE 50 UG/ML
INJECTION, SOLUTION INTRAMUSCULAR; INTRAVENOUS PRN
Status: DISCONTINUED | OUTPATIENT
Start: 2021-02-02 | End: 2021-02-02

## 2021-02-02 RX ORDER — PROPOFOL 10 MG/ML
INJECTION, EMULSION INTRAVENOUS PRN
Status: DISCONTINUED | OUTPATIENT
Start: 2021-02-02 | End: 2021-02-02

## 2021-02-02 RX ORDER — SODIUM CHLORIDE, SODIUM LACTATE, POTASSIUM CHLORIDE, CALCIUM CHLORIDE 600; 310; 30; 20 MG/100ML; MG/100ML; MG/100ML; MG/100ML
INJECTION, SOLUTION INTRAVENOUS CONTINUOUS
Status: DISCONTINUED | OUTPATIENT
Start: 2021-02-02 | End: 2021-02-02 | Stop reason: HOSPADM

## 2021-02-02 RX ADMIN — MIDAZOLAM 2 MG: 1 INJECTION INTRAMUSCULAR; INTRAVENOUS at 08:54

## 2021-02-02 RX ADMIN — PROPOFOL 150 MCG/KG/MIN: 10 INJECTION, EMULSION INTRAVENOUS at 08:54

## 2021-02-02 RX ADMIN — PHENYLEPHRINE HYDROCHLORIDE 100 MCG: 10 INJECTION INTRAVENOUS at 09:47

## 2021-02-02 RX ADMIN — PHENYLEPHRINE HYDROCHLORIDE 100 MCG: 10 INJECTION INTRAVENOUS at 09:36

## 2021-02-02 RX ADMIN — SODIUM CHLORIDE, POTASSIUM CHLORIDE, SODIUM LACTATE AND CALCIUM CHLORIDE: 600; 310; 30; 20 INJECTION, SOLUTION INTRAVENOUS at 08:53

## 2021-02-02 RX ADMIN — ONDANSETRON 4 MG: 2 INJECTION INTRAMUSCULAR; INTRAVENOUS at 09:50

## 2021-02-02 RX ADMIN — OXYCODONE HYDROCHLORIDE 5 MG: 5 TABLET ORAL at 11:31

## 2021-02-02 RX ADMIN — LIDOCAINE HYDROCHLORIDE 20 MG: 20 INJECTION, SOLUTION INFILTRATION; PERINEURAL at 08:55

## 2021-02-02 RX ADMIN — LIDOCAINE HYDROCHLORIDE 40 MG: 20 INJECTION, SOLUTION INFILTRATION; PERINEURAL at 09:09

## 2021-02-02 RX ADMIN — PHENYLEPHRINE HYDROCHLORIDE 100 MCG: 10 INJECTION INTRAVENOUS at 09:31

## 2021-02-02 RX ADMIN — DEXAMETHASONE SODIUM PHOSPHATE 4 MG: 4 INJECTION, SOLUTION INTRA-ARTICULAR; INTRALESIONAL; INTRAMUSCULAR; INTRAVENOUS; SOFT TISSUE at 09:20

## 2021-02-02 RX ADMIN — CEFAZOLIN SODIUM 2 G: 2 INJECTION, SOLUTION INTRAVENOUS at 08:54

## 2021-02-02 RX ADMIN — FENTANYL CITRATE 50 MCG: 50 INJECTION, SOLUTION INTRAMUSCULAR; INTRAVENOUS at 09:00

## 2021-02-02 RX ADMIN — PROPOFOL 100 MG: 10 INJECTION, EMULSION INTRAVENOUS at 09:09

## 2021-02-02 ASSESSMENT — MIFFLIN-ST. JEOR: SCORE: 1172.56

## 2021-02-02 ASSESSMENT — LIFESTYLE VARIABLES: TOBACCO_USE: 0

## 2021-02-02 NOTE — BRIEF OP NOTE
[facility]    Brief Operative Note    Pre-operative diagnosis: Toe pain, left [M79.675]  Post-operative diagnosis Same as pre-operative diagnosis    Procedure: Procedure(s):  ARTHROPLASTY OF LEFT 5TH TOE/ PARTIAL PHALANGECTOMY OF LEFT 5TH TOE  Surgeon: Surgeon(s) and Role:     * Ed Tello DPM - Primary  Anesthesia: General   Estimated blood loss: 1 ml  Drains: None  Specimens: * No specimens in log *  Findings:   None.  Complications: None.  Implants: * No implants in log *

## 2021-02-02 NOTE — ANESTHESIA PROCEDURE NOTES
Airway    Patient location during procedure: OR  Staff -   Anesthesiologist:  Ayaan Gonzalez MD  Resident/Fellow: Ed Villarreal APRN CRNA  Other Anesthesia Staff: Maximilian Mayfield  Performed By: ESME    Indications and Patient Condition  Indications for airway management: william-procedural  Induction type:intravenousMask difficulty assessment: 0 - not attempted    Final Airway Details  Final airway type: supraglottic airway    Endotracheal Airway Details   Secured with: pink tape    Post intubation assessment   Placement verified by: capnometry and chest rise   Number of attempts at approach: 1  Secured with:pink tape  Ease of procedure: easy  Dentition: Intact and Unchanged

## 2021-02-02 NOTE — OP NOTE
Procedure Date: 02/02/2021      SURGEON:  Ed Tello DPM      :  Alex West DPM      PREOPERATIVE DIAGNOSIS:  Toe pain, left fifth toe, due to painful corn and varus rotation.      PROCEDURES:   1.  Arthroplasty, left fifth toe.   2.  Partial phalangectomy, left fifth toe distal phalanx.      ANESTHESIA:  General.      ESTIMATED BLOOD LOSS:  1  mL      INJECTABLES:  Marcaine 0.5% plain.      COMPLICATIONS:  None apparent.      INDICATIONS FOR SURGERY:  Selam Rene is a 61-year-old female who presented to my clinic last week inquiring about surgical intervention.  She's had long-term pain associated with her left fifth toe and was scheduled to undergo surgery by one of my colleagues.  My colleague took an unexpected leave of absence and she presented hoping to proceed with surgical intervention.  Clinical exam revealed a painful corn overlying the distal medial aspect of her toe juxtaposed to the proximal interphalangeal joint of the fourth toe.  The fifth toe was noted to have some varus rotation.    I thought surgical intervention was reasonable as she had planned this with another provider.  I discussed options with her in detail and confirmed them again today prior to surgery.  I offered a partial phalangectomy or removal of some of the bone deep to the corn.  However, I think the rigidity of the toe with varus rotation contributes to the pressure on the skin, as her fifth toes contacts the fourth.  I also suggested an arthroplasty of the interphalangeal joint.  She was agreeable and opted to have both procedures done to increase the chance that she has pain relief.  Both procedures were discussed in detail including the risks, the benefits, the postoperative cares, course and prognosis.  No guarantees were given.      DESCRIPTION OF PROCEDURE:  Selam Rene was transported to the operating room and placed supine on the operating table.  IV sedation was initiated. Due to ongoing left  leg movement, .she was converted to general endotracheal anesthesia.  The left lower extremity was then prepped and draped in the normal aseptic fashion.  A timeout was called.  The left foot was exsanguinated and the ankle tourniquet inflated.      PROCEDURE #1:  Incision was made at the distal medial aspect of the left fifth toe.  This involved 2 semi-elliptical incisions that circumvented the corn.  Incision was taken through to the subcutaneous tissue.  The interposed wedge of tissue was removed including the corn.  Blunt dissection was then used to dissect down to the periosteum.  An incision was made through the periosteum.  Subperiosteal reflection was performed.  Some bone was removed with the use of a bone rongeur and a small rasp.  The area was irrigated with a copious amount of normal sterile saline.  Closure was performed with 4-0 Prolene.      PROCEDURE #2:  A dorsal lateral incision was made overlying the left fifth toe interphalangeal joint.  Layered dissection was performed.  A transverse tenotomy and capsulotomy was performed at the level of the joint.  The medial and lateral collateral ligaments were released.  Sagittal saw was then used to resect some of the head of the proximal phalanx.  After doing this, the toe was more movable in the transverse plane.  The wound was irrigated with a copious amount of normal sterile saline.  The extensor tendon was reapproximated with 4-0 Vicryl and skin reapproximated with 4-0 Prolene.      A well-padded compressive dressing was placed, splinting the left fifth toe in a more rectus position.  Selam Alvarez tolerated the anesthesia and procedure well.         JUDIE PAVON DPM             D: 2021   T: 2021   MT: YOVANY      Name:     SELAM ALVAREZ   MRN:      [id number]        Account:        [id number]   :      1960           Procedure Date: 2021      Document: [id number]

## 2021-02-02 NOTE — DISCHARGE INSTRUCTIONS
Same Day Surgery Discharge Instructions for  Sedation and General Anesthesia       It's not unusual to feel dizzy, light-headed or faint for up to 24 hours after surgery or while taking pain medication.  If you have these symptoms: sit for a few minutes before standing and have someone assist you when you get up to walk or use the bathroom.      You should rest and relax for the next 24 hours. We recommend you make arrangements to have an adult stay with you for at least 24 hours after your discharge.  Avoid hazardous and strenuous activity.      DO NOT DRIVE any vehicle or operate mechanical equipment for 24 hours following the end of your surgery.  Even though you may feel normal, your reactions may be affected by the medication you have received.      Do not drink alcoholic beverages for 24 hours following surgery.       Slowly progress to your regular diet as you feel able. It's not unusual to feel nauseated and/or vomit after receiving anesthesia.  If you develop these symptoms, drink clear liquids (apple juice, ginger ale, broth, 7-up, etc. ) until you feel better.  If your nausea and vomiting persists for 24 hours, please notify your surgeon.        All narcotic pain medications, along with inactivity and anesthesia, can cause constipation. Drinking plenty of liquids and increasing fiber intake will help.      For any questions of a medical nature, call your surgeon.      Do not make important decisions for 24 hours.      If you had general anesthesia, you may have a sore throat for a couple of days related to the breathing tube used during surgery.  You may use Cepacol lozenges to help with this discomfort.  If it worsens or if you develop a fever, contact your surgeon.       If you feel your pain is not well managed with the pain medications prescribed by your surgeon, please contact your surgeon's office to let them know so they can address your concerns.       CoVid 19 Information    We want to give you  information regarding Covid. Please consult your primary care provider with any questions you might have.     Patient who have symptoms (cough, fever, or shortness of breath), need to isolate for 7 days from when symptoms started OR 72 hours after fever resolves (without fever reducing medications) AND improvement of respiratory symptoms (whichever is longer).      Isolate yourself at home (in own room/own bathroom if possible)    Do Not allow any visitors    Do Not go to work or school    Do Not go to [place],  centers, shopping, or other public places.    Do Not shake hands.    Avoid close and intimate contact with others (hugging, kissing).    Follow CDC recommendations for household cleaning of frequently touched services.     After the initial 7 days, continue to isolate yourself from household members as much as possible. To continue decrease the risk of community spread and exposure, you and any members of your household should limit activities in public for 14 days after starting home isolation.     You can reference the following CDC link for helpful home isolation/care tips:  https://www.cdc.gov/coronavirus/2019-ncov/downloads/10Things.pdf    Protect Others:    Cover Your Mouth and Nose with a mask, disposable tissue or wash cloth to avoid spreading germs to others.    Wash your hands and face frequently with soap and water    Call Your Primary Doctor If: Breathing difficulty develops or you become worse.    For more information about COVID19 and options for caring for yourself at home, please visit the CDC website at https://www.cdc.gov/coronavirus/2019-ncov/about/steps-when-sick.html  For more options for care at [facility], please visit our website at [email or web address]        **If you have questions or concerns about your procedure,  call Dr. Tello at [phone]**

## 2021-02-02 NOTE — OR NURSING
Discharge instructions was given by danielito to sister Raven. No questions or concerns art this time

## 2021-02-02 NOTE — ANESTHESIA POSTPROCEDURE EVALUATION
Patient: Selam Rene    Procedure(s):  ARTHROPLASTY OF LEFT 5TH TOE/ PARTIAL PHALANGECTOMY OF LEFT 5TH TOE    Diagnosis:Toe pain, left [M79.675]  Diagnosis Additional Information: No value filed.    Anesthesia Type:  General    Note:     Postop Pain Control: Uneventful            Sign Out: Well controlled pain   PONV: No   Neuro/Psych: Uneventful            Sign Out: Acceptable/Baseline neuro status   Airway/Respiratory: Uneventful            Sign Out: Acceptable/Baseline resp. status   CV/Hemodynamics: Uneventful            Sign Out: Acceptable CV status   Other NRE: NONE   DID A NON-ROUTINE EVENT OCCUR? No         Last vitals:  Vitals:    02/02/21 1015 02/02/21 1030 02/02/21 1045   BP: 131/83 134/79 (!) 140/79   Pulse: 73 73 78   Resp: 13 14 11   Temp: 35.8  C (96.4  F) 35.7  C (96.3  F) 35.9  C (96.6  F)   SpO2: 100% 100% 93%       Electronically Signed By: Ayaan Gonzalez MD  February 2, 2021  11:06 AM

## 2021-02-02 NOTE — ANESTHESIA CARE TRANSFER NOTE
Patient: Selam Rene    Procedure(s):  ARTHROPLASTY OF LEFT 5TH TOE/ PARTIAL PHALANGECTOMY OF LEFT 5TH TOE    Diagnosis: Toe pain, left [M79.675]  Diagnosis Additional Information: No value filed.    Anesthesia Type:   MAC     Note:    Oropharynx: oropharynx clear of all foreign objects and spontaneously breathing  Level of Consciousness: awake  Oxygen Supplementation: face mask  Level of Supplemental Oxygen: 6  Independent Airway: airway patency satisfactory and stable  Dentition: dentition unchanged  Vital Signs Stable: post-procedure vital signs reviewed and stable  Report to RN Given: handoff report given  Patient transferred to: PACU  Comments: At end of procedure, spontaneous respirations, adequate tidal volumes, followed commands to voice, LMA removed atraumatically, oropharynx suctioned, airway patent after LMA removal. Oxygen via facemask at 6 liters per minute to PACU. Oxygen tubing connected to wall O2 in PACU, SpO2, NiBP, and EKG monitors and alarms on and functioning, Diana Hugger warmer connected to patient gown, report on patient's clinical status given to PACU RN, RN questions answered.  Handoff Report: Identifed the Patient, Identified the Reponsible Provider, Reviewed the pertinent medical history, Discussed the surgical course, Reviewed Intra-OP anesthesia mangement and issues during anesthesia, Set expectations for post-procedure period and Allowed opportunity for questions and acknowledgement of understanding      Vitals: (Last set prior to Anesthesia Care Transfer)  CRNA VITALS  2/2/2021 0929 - 2/2/2021 1004      2/2/2021             Pulse:  81    Ht Rate:  89    SpO2:  98 %    Resp Rate (observed):  (!) 2    Resp Rate (set):  10        Electronically Signed By: APPLE Llanes CRNA  February 2, 2021  10:04 AM

## 2021-02-02 NOTE — ANESTHESIA PREPROCEDURE EVALUATION
Anesthesia Pre-Procedure Evaluation    Patient: Selam Rene   MRN: [id number] : 1960        Preoperative Diagnosis: Toe pain, left [M79.675]   Procedure : Procedure(s):  ARTHROPLASTY, TOE (arthroplasty of left fifth toe with possible deratotation skin plasty)     Past Medical History:   Diagnosis Date     Adenocarcinoma of salivary gland (H)      Chronic rhinitis      Diverticulitis      Esophageal reflux      Hypertrophy of bone      Major depressive disorder with single episode, in full remission (H)      Malignant neoplasm of breast (female), unspecified site     chemo/radiation     Mass of pharynx      Mild intermittent asthma      Restless legs syndrome (RLS)      Shoulder pain, right      Situational anxiety       Past Surgical History:   Procedure Laterality Date     colon ressection       COLONOSCOPY       ENT SURGERY      tonsilectomy     GYN SURGERY      hysterectomy     MASTECTOMY, BILATERAL      25 surgeries with reconstruction     REPAIR PTOSIS BILATERAL Bilateral 2017    Procedure: REPAIR PTOSIS BILATERAL;  BILATERAL UPPER LID PTOSIS AND MECHANICAL PTOSIS REPAIR;  Surgeon: Lawrence West MD;  Location: [facility]     SOFT TISSUE SURGERY      MOH'S SURGERY     [facility]        Allergies   Allergen Reactions     Sulfa Drugs       Social History     Tobacco Use     Smoking status: Never Smoker     Smokeless tobacco: Never Used   Substance Use Topics     Alcohol use: Yes     Frequency: 2-4 times a month      Wt Readings from Last 1 Encounters:   21 63.5 kg (140 lb)        Anesthesia Evaluation   Pt has had prior anesthetic. Type: General.    No history of anesthetic complications       ROS/MED HX  ENT/Pulmonary: Comment: Hx of salivary gland adenocarcinoma    (+) allergic rhinitis, asthma  (-) tobacco use   Neurologic: Comment: RLS    (+) migraines,     Cardiovascular:       METS/Exercise Tolerance:     Hematologic:       Musculoskeletal:       GI/Hepatic:     (+) GERD,      Renal/Genitourinary:       Endo:       Psychiatric/Substance Use:     (+) psychiatric history anxiety and depression     Infectious Disease:       Malignancy:   (+) Malignancy, History of Breast.    Other:               OUTSIDE LABS:  CBC:   Lab Results   Component Value Date    WBC 8.4 02/05/2008    HGB 14.9 02/05/2008    HCT 43.8 02/05/2008     02/05/2008     BMP:   Lab Results   Component Value Date     02/05/2008    POTASSIUM 4.2 02/05/2008    CHLORIDE 104 02/05/2008    CO2 26 02/05/2008    BUN 11 02/05/2008    CR 0.63 02/05/2008    GLC 87 02/05/2008     COAGS: No results found for: PTT, INR, FIBR  POC: No results found for: BGM, HCG, HCGS  HEPATIC:   Lab Results   Component Value Date    ALBUMIN 5.0 (H) 02/05/2008    PROTTOTAL 9.4 (H) 02/05/2008    ALT 6 02/05/2008    AST 27 02/05/2008    ALKPHOS 96 02/05/2008    BILITOTAL 0.5 02/05/2008     OTHER:   Lab Results   Component Value Date    JOHANNA 10.3 02/05/2008       Anesthesia Plan     History & Physical Review      ASA Status:  3.   Plan for MAC Reason for MAC:  Deep or markedly invasive procedure (G8).         PONV prophylaxis:  Ondansetron (or other 5HT-3).       Consents  Anesthesia Plan(s) and associated risks, benefits, and realistic alternatives discussed.    Questions answered and patient/representative(s) expressed understanding.    Discussed with:  Patient.             Postoperative Care  Postoperative pain management: IV analgesics and Multi-modal analgesia.           Ayaan Gonzalez MD

## 2021-02-08 ENCOUNTER — RECORDS - HEALTHEAST (OUTPATIENT)
Dept: ADMINISTRATIVE | Facility: OTHER | Age: 61
End: 2021-02-08

## 2021-02-10 ENCOUNTER — AMBULATORY - HEALTHEAST (OUTPATIENT)
Dept: ULTRASOUND IMAGING | Facility: HOSPITAL | Age: 61
End: 2021-02-10

## 2021-02-10 ENCOUNTER — OFFICE VISIT (OUTPATIENT)
Dept: PODIATRY | Facility: CLINIC | Age: 61
End: 2021-02-10
Payer: COMMERCIAL

## 2021-02-10 ENCOUNTER — RECORDS - HEALTHEAST (OUTPATIENT)
Dept: ADMINISTRATIVE | Facility: OTHER | Age: 61
End: 2021-02-10

## 2021-02-10 VITALS
BODY MASS INDEX: 23.73 KG/M2 | DIASTOLIC BLOOD PRESSURE: 86 MMHG | HEIGHT: 64 IN | SYSTOLIC BLOOD PRESSURE: 128 MMHG | WEIGHT: 139 LBS

## 2021-02-10 DIAGNOSIS — Z11.59 ENCOUNTER FOR SCREENING FOR OTHER VIRAL DISEASES: ICD-10-CM

## 2021-02-10 DIAGNOSIS — Z09 SURGERY FOLLOW-UP EXAMINATION: Primary | ICD-10-CM

## 2021-02-10 PROCEDURE — 99024 POSTOP FOLLOW-UP VISIT: CPT | Performed by: PODIATRIST

## 2021-02-10 ASSESSMENT — MIFFLIN-ST. JEOR: SCORE: 1172.56

## 2021-02-10 NOTE — LETTER
2/10/2021         RE: Selam Rene  [address]  [place] [address]        Dear Colleague,    Thank you for referring your patient, Selam Rene, to the [facility]. Please see a copy of my visit note below.    S: Selam Rene  presents 1 week  post op.      Procedure(s) performed:      PREOPERATIVE DIAGNOSIS:  Toe pain, left fifth toe, due to painful corn and varus rotation.      PROCEDURES:   1.  Arthroplasty, left fifth toe.   2.  Partial phalangectomy, left fifth toe distal phalanx.      Intermittent burning and throbbing  4/10  Wearing surgical shoe  Asks if she can return to work tomorrow, with restrictions. She works in sterile processing    O:   Vascular:  Pedal pulses are palpable.  Minimal left 5th toe edema.    Neuro: Light touch sensation is intact distal to the incision.    Derm: The incisions are well coapted.  Sutures are intact.  No significant william-incisional erythema.     Musculoskeletal: left fifth toe rectus and does not contact the 4th toe like it did. It is non rigid.     ASSESSMENT:  1) s/p above noted surgery.  No clinical signs of infection.  Pain is controlled.  Encounter Diagnosis   Name Primary?     Surgery follow-up examination Yes       PLAN:  1) sterile re-dress of fifthfourth toe was performed.  2) she is to continue with the surgical shoe.  Ice and elevation on a as needed basis now.  3) I think it is reasonable for her to return to work with restrictions.  A letter was written for her to return to 6-hour shifts tomorrow through Monday.  Work is to be seated work only.  She has to be allowed to wear her surgical shoe.  If things go well, she can return to work 2/16/21 without restrictions.    She will follow-up with Dr. Guerin on 2/15/2021 for suture removal.  If doing well, return to work the next day without restrictions.  Wearing a stiff soled shoe will likely be helpful for the next month.  I asked her to follow-up with  me in some form at approximately 6 weeks postop.      Ed Tello DPM;        Again, thank you for allowing me to participate in the care of your patient.        Sincerely,        Ed Tello DPM

## 2021-02-10 NOTE — LETTER
[facility] PODIATRY  [address]  [address]  [facility] [address]  Phone: [phone]  Fax: [phone]      REPORT OF WORK ABILITY    NOTE TO EMPLOYEE: You must promptly provide a copy of this report to your  employer or worker's compensation insurer, and Qualified Rehabilitation Consultant.    Date: 2/10/2021                     Employee Name: Selam Rene         YOB: 1960      To Whom It May Concern:    Selam Rene had foot surgery 2/2/21. She was evaluated in clinic today and is doing well.      Return to work:  1) 2/11/21 with restrictions: seated work only (majority of the time); 6 hour shifts; allowed to wear surgical shoe.     2) 2/16/21: return to work without restrictions.     Thank you.        Ed Tello DPM, FACFAS, MS  [facility] Department of Podiatry/Foot & Ankle Surgery

## 2021-02-10 NOTE — PROGRESS NOTES
S: Selam Rene  presents 1 week  post op.      Procedure(s) performed:      PREOPERATIVE DIAGNOSIS:  Toe pain, left fifth toe, due to painful corn and varus rotation.      PROCEDURES:   1.  Arthroplasty, left fifth toe.   2.  Partial phalangectomy, left fifth toe distal phalanx.      Intermittent burning and throbbing  4/10  Wearing surgical shoe  Asks if she can return to work tomorrow, with restrictions. She works in sterile processing    O:   Vascular:  Pedal pulses are palpable.  Minimal left 5th toe edema.    Neuro: Light touch sensation is intact distal to the incision.    Derm: The incisions are well coapted.  Sutures are intact.  No significant william-incisional erythema.     Musculoskeletal: left fifth toe rectus and does not contact the 4th toe like it did. It is non rigid.     ASSESSMENT:  1) s/p above noted surgery.  No clinical signs of infection.  Pain is controlled.  Encounter Diagnosis   Name Primary?     Surgery follow-up examination Yes       PLAN:  1) sterile re-dress of fifthfourth toe was performed.  2) she is to continue with the surgical shoe.  Ice and elevation on a as needed basis now.  3) I think it is reasonable for her to return to work with restrictions.  A letter was written for her to return to 6-hour shifts tomorrow through Monday.  Work is to be seated work only.  She has to be allowed to wear her surgical shoe.  If things go well, she can return to work 2/16/21 without restrictions.    She will follow-up with Dr. Guerin on 2/15/2021 for suture removal.  If doing well, return to work the next day without restrictions.  Wearing a stiff soled shoe will likely be helpful for the next month.  I asked her to follow-up with me in some form at approximately 6 weeks postop.      Ed Tello, TITA;

## 2021-02-15 ENCOUNTER — OFFICE VISIT (OUTPATIENT)
Dept: PODIATRY | Facility: CLINIC | Age: 61
End: 2021-02-15
Payer: COMMERCIAL

## 2021-02-15 VITALS
WEIGHT: 139 LBS | HEIGHT: 64 IN | DIASTOLIC BLOOD PRESSURE: 70 MMHG | SYSTOLIC BLOOD PRESSURE: 112 MMHG | BODY MASS INDEX: 23.73 KG/M2

## 2021-02-15 DIAGNOSIS — Z98.890 S/P FOOT SURGERY, LEFT: Primary | ICD-10-CM

## 2021-02-15 DIAGNOSIS — M20.42 HAMMERTOE OF LEFT FOOT: ICD-10-CM

## 2021-02-15 PROCEDURE — 99024 POSTOP FOLLOW-UP VISIT: CPT | Performed by: PODIATRIST

## 2021-02-15 ASSESSMENT — MIFFLIN-ST. JEOR: SCORE: 1172.56

## 2021-02-15 NOTE — PROGRESS NOTES
"Foot & Ankle Surgery  February 15, 2021    S:  Patient in today sp left 5th toe arthroplasty and sandra-phalangectomy by Dr Tello on 2/2/21.  Pain levels minimal.  Wearing surgical shoe, but states she's been on her feet since post-op day 2.  Saw Dr Tello last week, and he gave her instructions on how to proceed after today if sutures are removed    /70   Ht 1.613 m (5' 3.5\")   Wt 63 kg (139 lb)   BMI 24.24 kg/m        ROS - positive for CC.  Patient denies current nausea, vomiting, chills, fevers, belly pain, calf pain, chest pain or SOB.  Complete remainder of ROS is otherwise neg.    PE - sutures removed, incisions healing well without gapping, drainage or SOI.  Skin shows no trophic, color or temperature changes otherwise.  No calf redness, swelling or pain noted otherwise.    A/P - 61 year old yo patient approx 2 weeks sp above procedure  -sutures removed, no s-s needed  -return to shoe gear as tolerated  -return to activities as tolerated  -RICE/NSAID vs tylenol prn based on pain  -tensogrip dispensed for edema control  -post-op instruction changes otherwise per Dr Tello    Follow up  -  4 weeks with Dr Tello or sooner with acute issues    Plan for [id number] - per Dr Tello's previous discussion       Costa Rubio, LAZARUSM FACFAS FACFAOM  Podiatric Foot & Ankle Surgeon  [facility]  [phone]      "

## 2021-03-06 ENCOUNTER — AMBULATORY - HEALTHEAST (OUTPATIENT)
Dept: FAMILY MEDICINE | Facility: CLINIC | Age: 61
End: 2021-03-06

## 2021-03-06 DIAGNOSIS — Z11.59 ENCOUNTER FOR SCREENING FOR OTHER VIRAL DISEASES: ICD-10-CM

## 2021-03-08 ENCOUNTER — HOSPITAL ENCOUNTER (OUTPATIENT)
Dept: ULTRASOUND IMAGING | Facility: HOSPITAL | Age: 61
Discharge: HOME | End: 2021-03-08
Attending: SURGERY

## 2021-03-08 ENCOUNTER — COMMUNICATION - HEALTHEAST (OUTPATIENT)
Dept: SCHEDULING | Facility: CLINIC | Age: 61
End: 2021-03-08

## 2021-03-08 DIAGNOSIS — R22.1 NECK NODULE: ICD-10-CM

## 2021-03-11 ENCOUNTER — TELEPHONE (OUTPATIENT)
Dept: NEUROLOGY | Facility: CLINIC | Age: 61
End: 2021-03-11

## 2021-03-11 NOTE — TELEPHONE ENCOUNTER
Component      Latest Ref Rng & Units 11/27/2020   Ferritin      10 - 130 ng/mL 104   Lyme Antibody Cascade      <0.90 Index Value 0.89   MMA Serum/Plasma, Vitamin B12 Status      0.00 - 0.40 umol/L 0.20   TSH      0.30 - 5.00 uIU/mL 2.56   Vitamin B-12      213 - 816 pg/mL 487       Patient is not schedule for follow up. I will send Scheduling dept a message to call patient and schedule follow up.

## 2021-03-26 ENCOUNTER — COMMUNICATION - HEALTHEAST (OUTPATIENT)
Dept: ADMINISTRATIVE | Facility: CLINIC | Age: 61
End: 2021-03-26

## 2021-03-26 DIAGNOSIS — J45.909 UNCOMPLICATED ASTHMA, UNSPECIFIED ASTHMA SEVERITY, UNSPECIFIED WHETHER PERSISTENT: ICD-10-CM

## 2021-05-25 ENCOUNTER — RECORDS - HEALTHEAST (OUTPATIENT)
Dept: ADMINISTRATIVE | Facility: CLINIC | Age: 61
End: 2021-05-25

## 2021-05-26 ENCOUNTER — RECORDS - HEALTHEAST (OUTPATIENT)
Dept: ADMINISTRATIVE | Facility: CLINIC | Age: 61
End: 2021-05-26

## 2021-05-27 ENCOUNTER — RECORDS - HEALTHEAST (OUTPATIENT)
Dept: ADMINISTRATIVE | Facility: CLINIC | Age: 61
End: 2021-05-27

## 2021-05-27 VITALS — HEART RATE: 64 BPM | SYSTOLIC BLOOD PRESSURE: 118 MMHG | RESPIRATION RATE: 14 BRPM | DIASTOLIC BLOOD PRESSURE: 80 MMHG

## 2021-05-27 VITALS
TEMPERATURE: 98.3 F | RESPIRATION RATE: 16 BRPM | SYSTOLIC BLOOD PRESSURE: 122 MMHG | OXYGEN SATURATION: 96 % | HEART RATE: 103 BPM | DIASTOLIC BLOOD PRESSURE: 83 MMHG

## 2021-05-27 ASSESSMENT — PATIENT HEALTH QUESTIONNAIRE - PHQ9
SUM OF ALL RESPONSES TO PHQ QUESTIONS 1-9: 1
SUM OF ALL RESPONSES TO PHQ QUESTIONS 1-9: 2

## 2021-05-28 ENCOUNTER — RECORDS - HEALTHEAST (OUTPATIENT)
Dept: ADMINISTRATIVE | Facility: CLINIC | Age: 61
End: 2021-05-28

## 2021-05-28 ENCOUNTER — RECORDS - HEALTHEAST (OUTPATIENT)
Dept: ADMINISTRATIVE | Facility: OTHER | Age: 61
End: 2021-05-28

## 2021-05-28 ASSESSMENT — ASTHMA QUESTIONNAIRES
ACT_TOTALSCORE: 25
ACT_TOTALSCORE: 20

## 2021-05-28 NOTE — TELEPHONE ENCOUNTER
Who is calling:  Patient  Reason for Call:  Checking status on below messages- no call back to patient, employer did NOT receive her note,( I re-faxed note to patients employer while on phone- no call back needed it was received)   Date of last appointment with primary care:    Has the patient been recently seen:  Yes  Okay to leave a detailed message: Yes

## 2021-05-28 NOTE — PROGRESS NOTES
Internal Medicine Office Visit  [facility]  Patient Name: Selam Rene  Patient Age: 59 y.o.  YOB: 1960  MRN: [id number]    Date of Visit: 2019  Reason for Office Visit:   Chief Complaint   Patient presents with     Breast Pain     Right side of breast pain for 6 months on the right breast. Had Breast Cancer in . Pain comes and goes. Has felt several bumps on the right side of right breast and under right arm.            Assessment / Plan / Medical Decision Makin. Enlarged lymph nodes  2. History of breast cancer  Given patient's history of breast cancer and current findings will recommend a chest x-ray, ultrasound of the right breast as well as right axillary lymph node ultrasound.  Patient was able to get scheduled for x-ray today the ultrasound is for tomorrow patient declines appointment for tomorrow indicating she she will reschedule for next week when it is more convenient for her.  Patient will follow post diagnostic testing, sooner if needed.  All patient's questions addressed she verbalized understanding and agree with plan.        Orders Placed This Encounter   Procedures     XR Chest 2 Views     US Breast Limited (Focal) Right     US Axillary Lymph Node Right   Followup: Return in about 1 week (around 2019). earlier if needed.    Health Maintenance Review  Health Maintenance   Topic Date Due     DEPRESSION FOLLOW UP  1960     ASTHMA FOLLOW-UP  1960     ADVANCE DIRECTIVES DISCUSSED WITH PATIENT  1978     PAP SMEAR  1990     INFLUENZA VACCINE RULE BASED (1) 2018     MAMMOGRAM  2020 (Originally 1960)     ASTHMA CONTROL TEST  2020     COLONOSCOPY  2021     TD 18+ HE  2027     TDAP ADULT ONE TIME DOSE  Completed         I am having Selam Rene maintain her albuterol, albuterol, diphenhydramine HCl (ZZZQUIL ORAL), fluticasone propionate, ibuprofen, cetirizine, valacyclovir  HCl (VALTREX ORAL), rizatriptan, ARIMIDEX, and lansoprazole.      HPI:  Selam Rene is a 59 y.o. year old who presents to the office today for 6-month history of right sided breast pain into right axilla.  She reports is most prominent at night.  She reports at times it runs under her right clavicle into her right axilla.  She denies any night sweats, unintentional weight loss, cough, bone pain.    2002 right breast cancer.  She did undergo bilateral mastectomy.  Is not currently seeing oncology perhaps last visit 2009.        Review of Systems- pertinent positive in bold:  Constitutional: Fever, chills, night sweats, fainting, weight change, fatigue, dizziness, sleeping difficulties, loud snoring/pauses in breathing  Eyes: change in vision, blurred or double vision, redness/eye pain  Ears, nose, mouth, throat: change in hearing, ear pain, hoarseness, difficulty swallowing, sores in the mouth or throat  Respiratory: shortness of breath, cough, bloody sputum, wheezing  Skin: change in moles/freckles, rash, nodules  Hematologic/lymphatic: See HPI  Neurologic/emotional: worrisome memory change, numbness/tingling, anxiety, mood swings      Current Scheduled Meds:  Outpatient Encounter Medications as of 4/25/2019   Medication Sig Dispense Refill     albuterol (PROAIR HFA;PROVENTIL HFA;VENTOLIN HFA) 90 mcg/actuation inhaler Inhale 2 puffs every 6 (six) hours as needed for wheezing.       albuterol (PROVENTIL) 2.5 mg /3 mL (0.083 %) nebulizer solution Take 3 mL (2.5 mg total) by nebulization every 6 (six) hours as needed for wheezing. 75 mL 0     anastrozole (ARIMIDEX) 1 mg tablet Take 1 mg by mouth daily.       cetirizine (ZYRTEC) 10 MG tablet Take 1 tablet (10 mg total) by mouth daily. 30 tablet 0     diphenhydramine HCl (ZZZQUIL ORAL) Take 25 mg by mouth at bedtime as needed.       fluticasone (FLOVENT HFA) 110 mcg/actuation inhaler Inhale 2 puffs daily.       ibuprofen (ADVIL,MOTRIN) 200 MG tablet Take 400 mg by  "mouth every 6 (six) hours as needed for pain.       lansoprazole (PREVACID) 15 MG capsule Take 1 capsule (15 mg total) by mouth daily. 30 capsule 2     rizatriptan (MAXALT) 10 MG tablet Take 1 tablet (10 mg total) by mouth every 2 (two) hours as needed for migraine. May repeat in 2 hours if needed 10 tablet 11     valacyclovir HCl (VALTREX ORAL) Take by mouth.       No facility-administered encounter medications on file as of 2019.      Past Medical History:   Diagnosis Date     Acute Sinusitis     Created by Conversion      Anxiety      Asthma      Basal cell carcinoma     nasal area, reconstruction.     Breast cancer (H)      Cyclic vomiting syndrome      Depression      Migraine      Past Surgical History:   Procedure Laterality Date     BLADDER SUSPENSION       NASAL RECONSTRUCTION      multiple mohs procedures and nasal reconstruction for BCC     [facility]  DELIVERY ONLY      Description:  Section;  Recorded: 2009;     CO ENLARGE BREAST      Description: Breast Surgery Enlargement Procedure Bilateral;  Recorded: 2011;  Comments: , , .     CO REMOVAL OF TONSILS,<11 Y/O      Description: Tonsillectomy;  Recorded: 2011;     CO TOTAL ABDOM HYSTERECTOMY      Description: Total Abdominal Hysterectomy;  Recorded: 2011;  Comments: with oopherectomy     SIMPLE MASTECTOMY  2002    bilateral, history of breast cancer     Social History     Tobacco Use     Smoking status: Never Smoker     Smokeless tobacco: Never Used   Substance Use Topics     Alcohol use: Yes     Frequency: 2-4 times a month     Drinks per session: 1 or 2     Comment: socially     Drug use: No       Objective / Physical Examination:  Vitals:    19 0915   BP: 118/78   Pulse: 91   Resp: 16   Temp: 98  F (36.7  C)   SpO2: 97%   Weight: 137 lb (62.1 kg)   Height: 5' 5\" (1.651 m)     Wt Readings from Last 3 Encounters:   19 137 lb (62.1 kg)   19 143 lb 6.4 oz (65 kg)   19 142 lb 3.2 oz " (64.5 kg)     Body mass index is 22.8 kg/m .     General Appearance: Alert and oriented, cooperative, affect appropriate, speech clear, in no apparent distress  Head: Normocephalic, atraumatic  Eyes:   Conjunctivae clear and sclerae non-icteric  Throat: Lips and mucosa moist.   Neck: Supple, trachea midline. No cervical adenopathy  Lungs:  Normal inspiratory and expiratory effort  Breast: Patient noted to have bilateral mastectomy with implants.  Upon palpation into the right axilla she does have a prominent lymph node noting it is tender to the touch.  Integumentary: Warm and dry. Without suspicious looking lesions  Neuro: Alert and oriented, follows commands appropriately.       Ayse Basurto, CNP

## 2021-05-28 NOTE — TELEPHONE ENCOUNTER
"Patient calling stating \"I hurt my back at work the other day\" Intermittent pain in lower left side of back that radiates to left side that she rates 6-7/10 due to an injury at work from heavy lifting that occurred on 4/25      \"I have a herniated disk on the right\"    Denies brusing    See assessment below    Per protocol, patient to be seen in the office today. Patient agreeable with the plan, care advice given and patient transferred to scheduling.    Reason for Disposition    Age > 50 and no history of prior similar back pain    Answer Assessment - Initial Assessment Questions  1. ONSET: \"When did the pain begin?\"       \"Last week on 4/25, burned real bad immediately then calmed down and then Monday started hurting more\"  2. LOCATION: \"Where does it hurt?\" (upper, mid or lower back)      \"Starts about where my left kidney is and radiates around the left side)  3. SEVERITY: \"How bad is the pain?\"  (e.g., Scale 1-10; mild, moderate, or severe)    - MILD (1-3): doesn't interfere with normal activities     - MODERATE (4-7): interferes with normal activities or awakens from sleep     - SEVERE (8-10): excruciating pain, unable to do any normal activities       6-7/10, Moderate pain  4. PATTERN: \"Is the pain constant?\" (e.g., yes, no; constant, intermittent)       Intermittent  5. RADIATION: \"Does the pain shoot into your legs or elsewhere?\"      Left side  6. CAUSE:  \"What do you think is causing the back pain?\"       \"I have no idea, it doesn't feel like a herniated disk\"  7. BACK OVERUSE:  \"Any recent lifting of heavy objects, strenuous work or exercise?\"      Yes, lifting heavy objects at work  8. MEDICATIONS: \"What have you taken so far for the pain?\" (e.g., nothing, acetaminophen, NSAIDS)      \"I have been taking ibuprofen\"  9. NEUROLOGIC SYMPTOMS: \"Do you have any weakness, numbness, or problems with bowel/bladder control?\"      Denies  10. OTHER SYMPTOMS: \"Do you have any other symptoms?\" (e.g., fever, " "abdominal pain, burning with urination, blood in urine)        Denies  11. PREGNANCY: \"Is there any chance you are pregnant?\" (e.g., yes, no; LMP)        No    Protocols used: BACK PAIN-A-OH      "
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
## 2021-05-29 ENCOUNTER — RECORDS - HEALTHEAST (OUTPATIENT)
Dept: ADMINISTRATIVE | Facility: CLINIC | Age: 61
End: 2021-05-29

## 2021-05-29 NOTE — TELEPHONE ENCOUNTER
RN cannot approve Refill Request    RN can NOT refill this medication pt has 15 mg cap pn med list not 30 mg.         Althea Ascencio, [facility] Triage/Med Refill 6/3/2019    Requested Prescriptions   Pending Prescriptions Disp Refills     lansoprazole (PREVACID) 30 MG capsule [Pharmacy Med Name: Lansoprazole Oral Capsule Delayed Release 30 MG] 30 capsule 0     Sig: Take 1 capsule (30 mg total) by mouth daily.       GI Medications Refill Protocol Passed - 6/1/2019  2:33 PM        Passed - PCP or prescribing provider visit in last 12 or next 3 months.     Last office visit with prescriber/PCP: 5/3/2019 Meg Stallworth PA-C OR same dept: 5/3/2019 Meg Stallworth PA-C OR same specialty: 5/3/2019 Meg Stallworth PA-C  Last physical: 2/8/2019 Last MTM visit: Visit date not found   Next visit within 3 mo: Visit date not found  Next physical within 3 mo: Visit date not found  Prescriber OR PCP: Meg Stallworth PA-C  Last diagnosis associated with med order: 1. Gastroesophageal reflux disease without esophagitis  - lansoprazole (PREVACID) 30 MG capsule [Pharmacy Med Name: Lansoprazole Oral Capsule Delayed Release 30 MG]; Take 1 capsule (30 mg total) by mouth daily.  Dispense: 30 capsule; Refill: 0    If protocol passes may refill for 12 months if within 3 months of last provider visit (or a total of 15 months).

## 2021-05-29 NOTE — PROGRESS NOTES
No spinal stenosis and no new disk changes since 2012, please call results to the patient or send a letter if not reachable by phone.

## 2021-05-29 NOTE — PROGRESS NOTES
HPI:  Selam Rene is a 59 y.o. female who is seen for   Chief Complaint   Patient presents with     Follow-up     Possible bladder infection   Selam Rene 2-week follow-up on chronic low back pain, and possible new bladder infection, has had symptoms of dysuria and nocturia.  She states that she has returned to work full-time, would like to have a note that lifts her restriction on weight lifting.  She states that a restriction of no more than 15 pounds would be good for 1 more week and then she may return to regular lifting.  She is trying to be careful about her back position, lifting with her legs, not pushing or pulling very heavy loads.  She denies numbness or tingling of her lower extremities and weakness of her lower extremities.  She would also like a referral to dermatologist for mole mapping.  She has history of basal cell cancer removed from her nose and needs to get set up for her yearly mole checks.  Review of systems: Negative for fever, nausea, vomiting, abdominal pain, oliguria, hematuria.    Lab Results   Component Value Date    HGBA1C 4.8 03/05/2013     Lab Results   Component Value Date    CREATININE 0.64 02/08/2019     Patient Active Problem List   Diagnosis     Restless Legs Syndrome     Migraine Headache     Mild Persistent Asthma     Diverticulosis     Depression     Collision Of Motor Vehicle With Non-motor Vehicle On Road     Multiple Renal Cysts     Anxiety     Chronic Rhinitis     Acute Sinusitis     Chronic Reflux Esophagitis     Gastritis     Lower Back Pain     Diverticulitis Of Colon     Herpes Simplex Type II     Rectocele     Asthma     Basal cell carcinoma, face     DDD (degenerative disc disease), lumbosacral     Disorder of bone and cartilage     H/O partial resection of colon     Lateral epicondylitis     Malignant neoplasm of female breast (H)     Mohs defect     Right knee DJD     S/P Mohs surgery for basal cell carcinoma     Allergic rhinitis     Depressive disorder  Esophageal reflux     Family History   Problem Relation Age of Onset     COPD Mother      Osteoporosis Mother      Depression Mother      Anxiety disorder Mother      Alcohol abuse Mother      Lung cancer Father      Stroke Father      Alcohol abuse Father      Liver disease Father      Diabetes Daughter      Depression Daughter      Breast cancer Maternal Aunt      Breast cancer Paternal Aunt      Stomach cancer Maternal great-grandfather      Social History     Socioeconomic History     Marital status:      Spouse name: None     Number of children: None     Years of education: None     Highest education level: None   Occupational History     None   Social Needs     Financial resource strain: None     Food insecurity:     Worry: None     Inability: None     Transportation needs:     Medical: None     Non-medical: None   Tobacco Use     Smoking status: Never Smoker     Smokeless tobacco: Never Used   Substance and Sexual Activity     Alcohol use: Yes     Frequency: 2-4 times a month     Drinks per session: 1 or 2     Comment: socially     Drug use: No     Sexual activity: Yes     Partners: Male   Lifestyle     Physical activity:     Days per week: None     Minutes per session: None     Stress: None   Relationships     Social connections:     Talks on phone: None     Gets together: None     Attends [employer] service: None     Active member of club or organization: None     Attends meetings of clubs or organizations: None     Relationship status: None     Intimate partner violence:     Fear of current or ex partner: None     Emotionally abused: None     Physically abused: None     Forced sexual activity: None   Other Topics Concern     None   Social History Narrative     None     Past Surgical History:   Procedure Laterality Date     BLADDER SUSPENSION       NASAL RECONSTRUCTION      multiple mohs procedures and nasal reconstruction for BCC     AR  DELIVERY ONLY      Description:  Section;   undergoing fluorescein dye angiography may retain small amounts of fluorescein in the body for 48-72 hours post procedure  Samples containing fluorescein can produce falsely depressed TSH values  If the patient had this procedure,a specimen should be resubmitted post fluorescein clearance            The recommended reference ranges for TSH during pregnancy are as follows:  First trimester 0 1 to 2 5 uIU/mL  Second trimester  0 2 to 3 0 uIU/mL  Third trimester 0 3 to 3 0 uIU/m Recorded: 2009;     MD ENLARGE BREAST      Description: Breast Surgery Enlargement Procedure Bilateral;  Recorded: 2011;  Comments: , , .     MD REMOVAL OF TONSILS,<11 Y/O      Description: Tonsillectomy;  Recorded: 2011;     MD TOTAL ABDOM HYSTERECTOMY      Description: Total Abdominal Hysterectomy;  Recorded: 2011;  Comments: with oopherectomy     SIMPLE MASTECTOMY  2002    bilateral, history of breast cancer     Current Outpatient Medications on File Prior to Visit   Medication Sig Dispense Refill     albuterol (PROAIR HFA;PROVENTIL HFA;VENTOLIN HFA) 90 mcg/actuation inhaler Inhale 2 puffs every 6 (six) hours as needed for wheezing.       albuterol (PROVENTIL) 2.5 mg /3 mL (0.083 %) nebulizer solution Take 3 mL (2.5 mg total) by nebulization every 6 (six) hours as needed for wheezing. 75 mL 0     anastrozole (ARIMIDEX) 1 mg tablet Take 1 mg by mouth daily.       cetirizine (ZYRTEC) 10 MG tablet Take 1 tablet (10 mg total) by mouth daily. 30 tablet 0     cyclobenzaprine (FLEXERIL) 5 MG tablet Take 1 tablet (5 mg total) by mouth 3 (three) times a day as needed for muscle spasms. 30 tablet 0     diphenhydramine HCl (ZZZQUIL ORAL) Take 25 mg by mouth at bedtime as needed.       fluticasone (FLOVENT HFA) 110 mcg/actuation inhaler Inhale 2 puffs daily.       ibuprofen (ADVIL,MOTRIN) 200 MG tablet Take 400 mg by mouth every 6 (six) hours as needed for pain.       lansoprazole (PREVACID) 30 MG capsule Take 1 capsule (30 mg total) by mouth daily. 30 capsule 2     rizatriptan (MAXALT) 10 MG tablet Take 1 tablet (10 mg total) by mouth every 2 (two) hours as needed for migraine. May repeat in 2 hours if needed 10 tablet 11     valacyclovir HCl (VALTREX ORAL) Take by mouth.       No current facility-administered medications on file prior to visit.      Allergies   Allergen Reactions     Sulfa (Sulfonamide Antibiotics)      hives       OB History        3    Para   3    Term   3        0    AB   0    Living   0       SAB   0    TAB   0    Ectopic   0    Multiple   0    Live Births   0               I have reviewed the patient's medical history in detail and updated the computerized patient record.  OBJECTIVE:  Wt Readings from Last 3 Encounters:   19 137 lb 11.2 oz (62.5 kg)   19 137 lb 8 oz (62.4 kg)   19 140 lb 6.4 oz (63.7 kg)     Temp Readings from Last 3 Encounters:   19 98  F (36.7  C)   18 98.1  F (36.7  C) (Oral)   18 98.1  F (36.7  C) (Oral)     BP Readings from Last 3 Encounters:   19 133/87   19 110/70   19 120/80     Pulse Readings from Last 3 Encounters:   19 78   19 82   19 87     Body mass index is 22.91 kg/m .     Alert, cooperative, well-hydrated. Appears well.  Eyes: Pupils equal, round, reactive to light.  HEENT: Sclera white, nares patent, MMM, TM's pearly bilaterally  Lungs: Clear to auscultation. No retractions, no increased work of respiration, equal chest rise.   Heart: Regular rate and rhythm, no murmurs, clicks,   Gallops.  Abdomen: Bowel sounds heard in 4 quadrants, no tenderness to light or deep palpation, liver and spleen are not palpably enlarged, and there are no masses noted.  Back: No tenderness to palpation of spinous processes from cervical, to lumbar spine.  No tenderness or palpable spasm of spinous rectus groups bilaterally.  Extremities: Normal bilateral leg lift.    Labs:  Office Visit on 2019   Component Date Value     Color, UA 2019 Yellow      Clarity, UA 2019 Cloudy*     Glucose, UA 2019 Negative      Bilirubin, UA 2019 Negative      Ketones, UA 2019 Negative      Specific Gravity, UA 2019 1.015      Blood, UA 2019 Negative      pH, UA 2019 7.0      Protein, UA 2019 Negative      Urobilinogen, UA 2019 0.2 E.U./dL      Nitrite, UA 2019 Negative      Leukocytes, UA 2019 Small*     Bacteria, UA  06/07/2019 None Seen      RBC,  06/07/2019 0-2      WBC,  06/07/2019 0-5      Squam Epithel,  06/07/2019 5-10*     Amorphous,  06/07/2019 Many*     Culture 06/07/2019 No Growth      ASSESMENT/PLAN:  1. DDD (degenerative disc disease), lumbosacral     2. Dysuria  Urinalysis-UC if Indicated    Urinalysis-UC if Indicated    Culture, Urine   3. BCC (basal cell carcinoma), face  Ambulatory referral to Dermatology   Reviewed her recent lumbar spine imaging, answered all her questions about her current minimal disc protrusion, she will continue to use ice, and ibuprofen and watch her lifting carefully.  Note was given to her for work for decreased lifting for 1 more week no more than 15 pounds and then may return to full lifting status.  If she has any pain with returning to full duties at work she will go to physical therapy, the order has been placed.  Return in approximately 4 weeks for physical.    Meg Stallworth, MS, PA-C 06/22/19

## 2021-05-29 NOTE — TELEPHONE ENCOUNTER
FYI - Status Update  Who is Calling: Patient  Update: Patient was checking the status of her results request below. Patient was informed of Meg Stallworth PA-C's message below about no new changes or spinal stenosis was noted on the MRI.  Okay to leave a detailed message?:  No return call needed

## 2021-05-29 NOTE — TELEPHONE ENCOUNTER
----- Message from Meg Stallworth PA-C sent at 6/13/2019  3:11 PM CDT -----  No spinal stenosis and no new disk changes since 2012, please call results to the patient or send a letter if not reachable by phone.

## 2021-05-29 NOTE — PATIENT INSTRUCTIONS - HE
Rest, elevation, Ice 20 minutes with light towel to protect the skin every 1-2 hours of the next 48 hours. 600-800 mg Ibuprofen with food three times daily for 2 weeks.

## 2021-05-29 NOTE — TELEPHONE ENCOUNTER
Urinalysis shows possible urinary tract infection, Macrobid sent to your pharmacy, will call with culture results if we need to change the medication.

## 2021-05-29 NOTE — TELEPHONE ENCOUNTER
----- Message from Meg Stallworth PA-C sent at 6/22/2019  3:41 PM CDT -----  Urine testing is normal, no infection, please call results to the patient or send a letter if not reachable by phone.

## 2021-05-29 NOTE — TELEPHONE ENCOUNTER
Call placed to patient. Left detailed message for patient to let her that she does have a UTI. I did let her know Meg did send a medication to her pharmacy.

## 2021-05-29 NOTE — PATIENT INSTRUCTIONS - HE
Continue Ice 20 min with light towel to protect skin.  Ibuprofen 800 mg three times a day with food.  6-8 glasses of water daily.  Continue muscle relaxer, 1/2 to one whole tab at night.  Off work for 2 days.
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
## 2021-05-29 NOTE — TELEPHONE ENCOUNTER
Test Results  Who is calling ? Patient  Who ordered the test:  Meg Stallworth PA-C  Type of test: Imaging- MRI lumbar spine  Date of test:  6/12/19  Where was the test performed: [facility]  What are your questions/concerns?: Patient in pain,  please call patient with results,  Okay to leave a detailed message?:  Yes

## 2021-05-29 NOTE — TELEPHONE ENCOUNTER
Who is calling:  Patient  Reason for Call: Regarding below message.  Patient states provider can reach her at: [phone]  Date of last appointment with primary care: 6/7/2019  Okay to leave a detailed message: Yes

## 2021-05-29 NOTE — PROGRESS NOTES
Urine testing is normal, no infection, please call results to the patient or send a letter if not reachable by phone.

## 2021-05-30 NOTE — PROGRESS NOTES
"HISTORY OF PRESENT ILLNESS  Patient is a 58 y/o female who presents to the clinic for evaluation of mouth lesion at the request of Dahlia Gomez CNP.  She noted a bump in the back right of her mouth yesterday.  Thinks its been there for awhile because \"it looks like it.\" Occasionally tastes blood. Denies pain, otalgia, dysphagia, odonyphagia, hemoptysis, chewing difficulty, fever, weight loss, or other issues.  Has not been to the dentist in > 1 year as she hasn't had dental issues.  Notes she has a broken tooth on the upper right side. Denies gum or teeth pain.      REVIEW OF SYSTEMS  Review of Systems: a 10-system review was performed. Pertinent positives are noted in the HPI and on a separate scanned document in the chart.    PMH, PSH, FH and SH has documented in the EHR.      EXAM    CONSTITUTIONAL  General Appearance:  Normal, well developed, well nourished, no obvious distress  Ability to Communicate:  communicates appropriately.    HEAD AND FACE  Appearance and Symmetry:  Normal, no scalp or facial scarring or suspicious lesions.  Paranasal sinuses tenderness:  Normal, Paranasal sinuses non tender    EARS  Clinical speech reception threshold:  Normal, able to hear normal speech.  Auricle:  Normal, Auricles without scars, lesions, masses.  External auditory canal:  Normal, External auditory canal normal.  Tympanic membrane:  Normal, Tympanic membranes normal without swelling or erythema.  Tympanic membrane mobility:  Normal, Normal tympanic membrane mobility.    NOSE (speculum or scope)  Architecture:  Normal, Grossly normal external nasal architecture with no masses or lesions.  Mucosa:  Normal mucosa, No polyps or masses.  Septum:  Normal, Septum non-obstructing.  Turbinates:  Normal, No turbinate abnormalities    ORAL CAVITY AND OROPHARYNX  Lips:  Normal.  Dental and gingiva:  Red, irritation along upper right gum line that is tender to palpation.  Mucosa:  Normal, Moist mucous membranes.  Tongue:  Normal, " Tongue mobile with no mucosal abnormalities  Hard and soft palate:  Normal, Hard and soft palate without cleft or mucosal lesions.  Oral pharynx:  Normal, Posterior pharynx without lesions or remarkable asymmetry.  Saliva:  Normal, Clear saliva.  Masses:  1 cm, soft mas with central opening noted in the retromolar trigone on the right. Pus and blood able to be expressed from opening. No palpable masses or pathologically enlarged lymph nodes.    NECK  Masses/lymph nodes:  Normal, No worrisome neck masses or lymph nodes.  Salivary glands:  Normal, Parotid and submandibular glands.  Trachea and larynx position:  Normal, Trachea and larynx midline.  Thyroid:  Normal, No thyroid abnormality.  Tenderness:  Normal, No cervical tenderness.  Suppleness:  Normal, Neck supple    NEUROLOGICAL  Speech pattern:  Normal, Proasaic    RESPIRATORY  Symmetry and Respiratory effort:  Normal, Symmetric chest movement and expansion with no increased intercostal retractions or use of accessory muscles.     IMPRESSION  Patient with mass in retromolar trigone area on the right suspicious for dental abscess.  She also has some area of irritation on her gum line which could be due to a dental infection as well.  Will obtain CT scan to assess area further.  Patient with good understanding.    RECOMMENDATION  CT scan of neck with focus on oral cavity to assess for odontogenic source of mass.  Will call patient with results and if this is, in fact, and odontogenic source, then will have patient follow-up with oral surgeon.    Josiah Castillo MD    Seen in conjunction with Sania Ray PA-C

## 2021-05-30 NOTE — PATIENT INSTRUCTIONS - HE
Surgery Information      Our  will call to schedule time and date of surgery.    ( Arrive at the hospital one and a half hours prior to your surgery and 1 hour prior at the surgery center. Check in at the . The only exception is if you are scheduled for surgery at 7am, you should arrive at 5:30am) The nurse will call you a couple of day before surgery go with the time the nurse tells you.  All surgery times are estimated please go with what the nurse tells you when she calls.  Emergency's do happen and surgery times do get changed the nurse will let you know.  We give you the best estimate of time that we can at the time.      IF YOU NEED TO RESCHEDULE OR CANCEL YOUR SURGERY FOR ANY REASON PLEASE CALL THE CLINIC AS SOON AS POSSIBLE [phone].    Admission Type: Outpatient    Surgeon: Dr. Weiss    Surgery Procedure: Partial Phalangectomy 5th toe left foot. (cpt 06091)    Surgery Location: [facility],[address] [place] [address], FAX [phone]    Additional Information: If you use a CPAP machine for sleep apnea please bring it with you for surgery. We will want to monitor your breathing using your normal equipment.     HOSPITAL AND SURGERY CENTER INFORMATION    We need to know a lot of information about you before surgery.     1-5 Days Before:     A nurse will call you for a pre-screening interview. The phone call with the nurse will be much faster and easier if you  Have organized your information prior to the call.   This is when you will be told when to show up and what to bring.    Please have the following information available:    Preoperative Exam completed and faxed to our office  has to be within 30 days will not except 31 days.    Primary care provider's and any specialty providers' contact information available. .    If requested by your primary care provider, have any heart and lung exams at least 3 days before surgery.    Have a complete and accurate list of  medications available.     Have a list of your allergies/sensitivities and reactions    Know your health history, surgical history, and medical problems    Know any anesthesia issues with you or within your family.     BE SURE TO NOTIFY US IF YOU ARE TAKING ANY BLOOD THINNING AGENTS: Coumadin, Plavix, Aspirin, Xarelto, Eliquis    Someone plan to bring you and stay with you after the surgery for 24 hours    Day Before Surgery    1. STOP Smoking and Drinking: It is important to stop smoking and drinking at least 24 hours before surgery.  Smoking and drinking may cause complications in your recovery from anesthesia and may lengthen the healing process.    Please bring with you the day of surgery      List of medication    Eyeglass case or contact case with solution. You cannot wear contacts during surgery    Copy of Health Care Directives, Living Will or Power of     Insurance Cards    Photo ID    3. NOTHING BY MOUTH 8 HOURS BEFORE. This includes gum, hard candy, water and mints. The only exception is if you have been instructed by your doctor to take your medications with sips of water. You may rinse your mouth or brush your teeth, but do not swallow water.     4. Remove Nail Polish on fingers as well as toes  Day of Surgery    1. Medications- Take as indicated with sips of water     2. Wear comfortable loose fitting clothes. Wear your glasses-Not contacts. Do not wear jewelry and remove body piercing's. Surgery may be cancelled if they are not removed.     3. If same day surgery-Have a someone come with you to surgery that can help you understand the surgeon's instructions, drive you home and stay with you overnight the first night.     4. A nurse will call you at home within 72 hours following surgery to see how you are doing. Our nurses and doctors will discuss recovery with you.       POST-OPERATIVE CARE INSTRUCTIONS    After surgery  You will have swelling in the foot, and pain from the incision. The  "swelling is related to the increased blood flow to the foot in response to the \"surgical injury\" as well as the decreased capacity of blood to return to the heart due to less lower leg muscle contractions (which have the effect of increasing venous blood return). Swelling is often directly proportional to the pain. The greatest swelling occurs in the first 3-4 days after surgery . After that the swelling gradually diminishes. However, it often takes many months (or even a year for major surgery) for all of the swelling to resolve. The pain associated with swelling can vary widely. If it is not managed appropriately it can be very uncomfortable and frustrating.      1. Following surgery, go directly home and elevate your foot. Elevate your foot about 6 inches about the level of you hip by placing pillows under your foot and leg. Do not sit with your foot down, dangling or with your feet crossed.      2. Rest as often as possible with your foot elevated to reduce the occurrence of swelling and associated pain. Reserve walking for using the restroom and getting food/beverages.     3. Place ice over foot/ ankle area or behind the knee (if casted) for 20 minutes of each hour for 24 hours. Ice should NEVER be used when the foot is numb from a nerve block as this can easily lead to frostbite.     4. Use pain medications as prescribed with food. Once home, take pain medication and do the same at supper time and bedtime. The pain medication and ice should help to \"control\" your pain, however, it may NOT take away all of the pain.      Take or ask for pain relief medication when pain begins. It is easier to prevent or relieve pain before it becomes too bad.     Some activities may make pain worse. Take your pain medicine first.     Your doctor and nurse use a 0-10 pain rating scale to report your pain. 0= no pain and 10 = the worst possible pain. Reporting a number helps us to understand how well your pain control plan is " working and if your pain control plan needs changes.      5. Keep your bandages clean and dry while the incision heals. A small amount of bleeding is normal. DO NOT CHANGE THE DRESSING! If your bandages become damp call our office immediately. Options to keep this area dry when bathing include:      SPONGE BATHS    PLASTIC BAG WITH TAPE OR CAST BAG     SHOWER CHAIR OR HANDHELD SHOWER HEAD    DANGLING YOUR LEG/FOOT OVER THE EDGE OF THE TUB     6. Exercise your legs frequently by bending your knees to stimulate circulation and help aid in healing.     7. If given a post operative shoe or cast boot wear at all times when walking. You may remove the post-operative shoe or cast boot at bedtime. If the cast boot becomes too heavy or painful it may be removed only while the foot is elevated and not bearing weight.      8. DO NOT operate heavy equipment, drive a vehicle for 24 hours after surgery and while taking pain medications. You may resume driving when you feel you can do so safely.     CALL IMMEDIATELY IF:    THE BANDAGES ARE SOAKED FROM BLOOD, DRAINAGE OR WATER    YOUR MEDICATION DOES NOT MANAGE THE DISCOMFORT    YOU INJURE YOUR FOOT    YOU DEVELOP A FEVER    THE BANDAGES BECOME TOO TIGHT OR YOUR TOES BECOME NUMB (after 24 hours you may cut your bandage 1 inch from the toe area and ankle area this will relieve pain)    PLEASE CALL THE [facility] PODIATRY LINE [phone] WITH ANY QUESTION OR CONCERNS. IF CALLING AFTER REGULAR BUSINESS HOURS THE PHYSICIAN ON CALL WILL BE PAGED TO RETURN YOUR CALL.    The doctor will need to see you for 1-3 post operative appointments depending on your surgery and recovery. Please ensure these are scheduled before your surgery or immediately after surgery.

## 2021-05-30 NOTE — TELEPHONE ENCOUNTER
"Lab orders were placed in patient chart.  Patient specimen did not sent to the lab the day orders were placed.    Lab orders must be cancelled and reordered as \"Future\" with an expected date.  Thank you  "

## 2021-05-30 NOTE — PROGRESS NOTES
FOOT AND ANKLE SURGERY/PODIATRY CONSULT NOTE         ASSESSMENT:   Hypertrophy of bone fifth toe left foot  Tyloma fifth toe left foot      TREATMENT:  Debrided hyperkeratotic lesion fifth toe left foot.  I have recommended partial phalangectomy distal phalanx fifth toe left foot        HPI: Selam Rene presented to the clinic today complaining of a painful recurring corn between the fourth and  fifth toe left foot.  The patient indicated she has had this problem for several years.  She stated that the lesion is very painful particularly when wearing normal shoes.  It is extremely thick and difficult to treat.  He describes it as a sharp pain which is aggravated with weightbearing and ambulation.  She has not had any redness, swelling, drainage of bleeding.  She denies any trauma to her left foot.  She denies any other previous treatment.  She is very frustrated at the inability to prevent recurrence of this painful lesion.       Past Medical History:   Diagnosis Date     Acute Sinusitis     Created by Conversion      Anxiety      Asthma      Basal cell carcinoma     nasal area, reconstruction.     Breast cancer (H)      Cyclic vomiting syndrome      Depression      Migraine        Past Surgical History:   Procedure Laterality Date     BLADDER SUSPENSION       NASAL RECONSTRUCTION      multiple mohs procedures and nasal reconstruction for BCC     [place]  DELIVERY ONLY      Description:  Section;  Recorded: 2009;     [place] ENLARGE BREAST      Description: Breast Surgery Enlargement Procedure Bilateral;  Recorded: 2011;  Comments: , , .     [place] REMOVAL OF TONSILS,<11 Y/O      Description: Tonsillectomy;  Recorded: 2011;     [place] TOTAL ABDOM HYSTERECTOMY      Description: Total Abdominal Hysterectomy;  Recorded: 2011;  Comments: with oopherectomy     SIMPLE MASTECTOMY  2002    bilateral, history of breast cancer       Allergies   Allergen Reactions     Sulfa (Sulfonamide  Antibiotics)      hives           Current Outpatient Medications:      albuterol (PROAIR HFA;PROVENTIL HFA;VENTOLIN HFA) 90 mcg/actuation inhaler, Inhale 2 puffs every 6 (six) hours as needed for wheezing., Disp: , Rfl:      albuterol (PROVENTIL) 2.5 mg /3 mL (0.083 %) nebulizer solution, Take 3 mL (2.5 mg total) by nebulization every 6 (six) hours as needed for wheezing., Disp: 75 mL, Rfl: 0     anastrozole (ARIMIDEX) 1 mg tablet, Take 1 mg by mouth daily., Disp: , Rfl:      cetirizine (ZYRTEC) 10 MG tablet, Take 1 tablet (10 mg total) by mouth daily., Disp: 30 tablet, Rfl: 0     cyclobenzaprine (FLEXERIL) 5 MG tablet, Take 1 tablet (5 mg total) by mouth 3 (three) times a day as needed for muscle spasms., Disp: 30 tablet, Rfl: 0     diphenhydramine HCl (ZZZQUIL ORAL), Take 25 mg by mouth at bedtime as needed., Disp: , Rfl:      fluticasone (FLOVENT HFA) 110 mcg/actuation inhaler, Inhale 2 puffs daily., Disp: , Rfl:      ibuprofen (ADVIL,MOTRIN) 200 MG tablet, Take 400 mg by mouth every 6 (six) hours as needed for pain., Disp: , Rfl:      lansoprazole (PREVACID) 30 MG capsule, Take 1 capsule (30 mg total) by mouth daily., Disp: 30 capsule, Rfl: 2     rizatriptan (MAXALT) 10 MG tablet, Take 1 tablet (10 mg total) by mouth every 2 (two) hours as needed for migraine. May repeat in 2 hours if needed, Disp: 10 tablet, Rfl: 11     valacyclovir HCl (VALTREX ORAL), Take by mouth., Disp: , Rfl:     Family History   Problem Relation Age of Onset     COPD Mother      Osteoporosis Mother      Depression Mother      Anxiety disorder Mother      Alcohol abuse Mother      Lung cancer Father      Stroke Father      Alcohol abuse Father      Liver disease Father      Diabetes Daughter      Depression Daughter      Breast cancer Maternal Aunt      Breast cancer Paternal Aunt      Stomach cancer Maternal great-grandfather        Social History     Socioeconomic History     Marital status:      Spouse name: Not on file      Number of children: Not on file     Years of education: Not on file     Highest education level: Not on file   Occupational History     Not on file   Social Needs     Financial resource strain: Not on file     Food insecurity:     Worry: Not on file     Inability: Not on file     Transportation needs:     Medical: Not on file     Non-medical: Not on file   Tobacco Use     Smoking status: Never Smoker     Smokeless tobacco: Never Used   Substance and Sexual Activity     Alcohol use: Yes     Frequency: 2-4 times a month     Drinks per session: 1 or 2     Comment: socially     Drug use: No     Sexual activity: Yes     Partners: Male   Lifestyle     Physical activity:     Days per week: Not on file     Minutes per session: Not on file     Stress: Not on file   Relationships     Social connections:     Talks on phone: Not on file     Gets together: Not on file     Attends [employer] service: Not on file     Active member of club or organization: Not on file     Attends meetings of clubs or organizations: Not on file     Relationship status: Not on file     Intimate partner violence:     Fear of current or ex partner: Not on file     Emotionally abused: Not on file     Physically abused: Not on file     Forced sexual activity: Not on file   Other Topics Concern     Not on file   Social History Narrative     Not on file       Review of Systems - Patient denies fever, chills, rash, wound, stiffness, limping, numbness, weakness, heart burn, blood in stool, chest pain with activity, calf pain when walking, shortness of breath with activity, chronic cough, easy bleeding/bruising, swelling of ankles, excessive thirst, fatigue, depression, anxiety.  Patient admits to painful fifth toe left foot.      OBJECTIVE:  Appearance: alert, well appearing, and in no distress.    Vitals:    07/24/19 0946   BP: 117/79   Pulse: 95   SpO2: 98%       BMI= Body mass index is 22.8 kg/m .    General appearance: Patient is alert and fully  cooperative with history & exam.  No sign of distress is noted during the visit.  Psychiatric: Affect is pleasant & appropriate.  Patient appears motivated to improve health.  Respiratory: Breathing is regular & unlabored while sitting.  HEENT: Hearing is intact to spoken word.  Speech is clear.  No gross evidence of visual impairment that would impact ambulation.    Vascular: Dorsalis pedis and posterior tibial pulses are palpable. There is pedal hair growth bilaterally.  CFT < 3 sec from anterior tibial surface to distal digits bilaterally. There is no appreciable edema noted.  Dermatologic: There is a thick hyperkeratotic nucleated lesion on the medial aspect of the distal interphalangeal joint fifth toe left foot.  Turgor and texture are within normal limits. No coloration or temperature changes. No other primary or secondary lesions noted.  Neurologic: All epicritic and proprioceptive sensations are grossly intact bilaterally.  Musculoskeletal: All active and passive ankle, subtalar, midtarsal, and 1st MPJ range of motion are grossly intact without pain or crepitus, with the exception of none. Manual muscle strength is within normal limits bilaterally. All dorsiflexors, plantarflexors, invertors, evertors are intact bilaterally. Tenderness present to fifth toe left foot on palpation.  No tenderness to fifth toe left foot with range of motion. Calf is soft/non-tender without warmth/induration    Imaging:     Ct Soft Tissue Neck With Contrast    Result Date: 7/10/2019  [facility] CT SOFT TISSUE NECK W CONTRAST 7/10/2019 6:11 PM  INDICATION: Mandibular jaw infection. Lump on the inside cheek. TECHNIQUE: Contiguous serial axial images were obtained through the neck after the administration of IV contrast. Multiplanar reformats were generated. Dose reduction techniques were used. CONTRAST: 100 mL Omnipaque 350 intravenous contrast. COMPARISON: None. FINDINGS: Assessment of the oral cavity at the level of  the mandible is markedly degraded by streak artifact from dental amalgam. Where visualized, oral cavity, mucosal space, and airway are unremarkable. Mild mucosal thickening floor right maxillary sinus. On bone windows, there is a small area of periapical lucency about the right first mandibular molar. No cortical destruction. Parotid, submandibular, and thyroid glands are unremarkable. Small bilateral cervical chain lymph nodes are present that do not meet size criteria for pathologic enlargement. No suppurative or necrotic lymph nodes are seen. Major cervical vascular structures demonstrate normal enhancement. Orbits and visualized portion of the brain are unremarkable. Visualized lung apices are clear. Postsurgical changes are seen involving the anterior right chest wall with areas of dense sclerosis within the ventral superior ribs on the right most likely reflecting prior radiation therapy. Correlate with clinical/surgical history. Left subpectoral breast implant.     1. Periapical lucency is seen about the right first mandibular molar. No associated cortical disruption. Assessment of the oral cavity at the level of the mandible/teeth is markedly degraded by streak artifact from dental amalgam. Where visualized, the oral cavity is unremarkable. No abnormal neck masses or collections. No cervical lymphadenopathy. 2. Postsurgical/treatment changes anterior chest wall on the right.          TREATMENT:  Debrided the hyperkeratotic lesion fifth toe left foot today.  I informed the patient that she will require a surgical procedure to prevent recurrence.  I recommended a partial phalangectomy of the fifth toe left foot to prevent recurrence of the painful hyperkeratotic lesion.  The patient was told this procedure would be performed on an outpatient basis under local anesthesia with IV sedation.  She was told that the procedures take approximately 10 minutes to perform.  She would then be discharged and able to  weight-bear in a postoperative shoe for 2 weeks.  The patient was asked to go n.p.o. at midnight prior to the procedure and she was asked to see her primary care physician for preoperative consultation.  The patient was given a written list of potential postoperative complications with the procedure.    Noah Weiss; TITA  [facility] Foot & Ankle Surgery/Podiatry

## 2021-05-30 NOTE — TELEPHONE ENCOUNTER
Please call pt at work with oral surgeon information due to her cell not working in her work building    Work # [phone]

## 2021-05-30 NOTE — PATIENT INSTRUCTIONS - HE
Dr. Flores is recommending consultation with ear nose and throat for possible biopsy of growth in your mouth.  If this gets much bigger or you develop pain with it, please see your primary care doctor in the meantime if the ENT appointment is booked for out.    Our specialty  will call you tomorrow to help schedule an appointment.  Recommend approximately 1 week or less.

## 2021-05-30 NOTE — TELEPHONE ENCOUNTER
Left message to call back.    Hannah Giraldo RN  [facility] ENT  [phone]      Spoke with patient. She says Dr. Castillo called her and said she has a dental abscess and needs to see an oral surgeon. I gave her 3 different phone numbers to oral surgeons in [place] to see who she could get in with the soonest.    Hannah Giraldo RN  [facility]  [phone]

## 2021-05-30 NOTE — TELEPHONE ENCOUNTER
I cannot figure out how to cancel this lab after the visit is already signed.  You may cancel rapid strep for me pleaes. Dahlia Gomez, CNP

## 2021-05-31 ENCOUNTER — RECORDS - HEALTHEAST (OUTPATIENT)
Dept: ADMINISTRATIVE | Facility: CLINIC | Age: 61
End: 2021-05-31

## 2021-05-31 NOTE — TELEPHONE ENCOUNTER
Phone: [phone]   Call her ENT and she needs the ENT to call her today and answer all her questions concerning neck mass.    Discussed her MRI of neck by phone today.

## 2021-05-31 NOTE — PROGRESS NOTES
Surgery/Procedure: exostectomy left 5th toe     Special Equipment: TBD    Location: [facility]    Date: 10/21/19    Time: 7am     Surgeon: Dr. Weiss    Assist: no    Length of Surgery: 60 min    OR Confirmed/ :  Yes with zoa on 8/14/19    Orders In:  Yes    Provider Team Notified:  Yes    Entered on [employer] [facility] [employer] Calendar:  Yes    Post Op: 10/3 and 11/6/19 @
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
## 2021-05-31 NOTE — TELEPHONE ENCOUNTER
Called [facility].  The staff states that the MRI was done outside their system and the results were uploaded today.  She will have the results reviewed and patient will be contacted accordingly.

## 2021-05-31 NOTE — TELEPHONE ENCOUNTER
Test Results  Who is calling?:  Patient  Who ordered the test:  Meg Stallworth PA-C    Type of test: Imaging  Date of test:  8/12/19  Where was the test performed:  [facility]  What are your questions/concerns?:  Patient is anxious for MRI results as soon as available.  Okay to leave a detailed message?:  No   Patient states works 10-6pm, best to call at work number listed

## 2021-05-31 NOTE — TELEPHONE ENCOUNTER
For patient with all instructions surgery with DR arteaga on 10/21/19 @ 7am @ [facility] preop instructions npo instructions and postop

## 2021-05-31 NOTE — TELEPHONE ENCOUNTER
Who is calling:  Patient  Reason for Call:  Please call with results ASAP.   Date of last appointment with primary care: NA  Okay to leave a detailed message: No [phone]    if not at that number please call [phone]

## 2021-05-31 NOTE — TELEPHONE ENCOUNTER
Who is calling:  Patient  Reason for Call:  Patient is frustrated and wants to know her results. Please call her back.  Date of last appointment with primary care: 8/6/19  Okay to leave a detailed message: No  [phone]

## 2021-06-01 ENCOUNTER — RECORDS - HEALTHEAST (OUTPATIENT)
Dept: ADMINISTRATIVE | Facility: CLINIC | Age: 61
End: 2021-06-01

## 2021-06-01 NOTE — TELEPHONE ENCOUNTER
Medication Request  Medication name:     fluticasone (FLOVENT HFA) 110 mcg/actuation inhaler        Sig - Route: Inhale 2 puffs daily. - Inhalation    Class: Historical Med      albuterol (PROAIR HFA;PROVENTIL HFA;VENTOLIN HFA) 90 mcg/actuation inhaler        Sig - Route: Inhale 2 puffs every 6 (six) hours as needed for wheezing. - Inhalation    Class: Historical Med      valacyclovir HCl (VALTREX ORAL)        Sig - Route: Take by mouth. - Oral    Class: Historical Med      Pharmacy Name and Location: [id number] - [place], [place] - [address] [place] [address]   Reason for request: As requested by the pharmacy the patient would like to resume taking the medication.   When did you use medication last?:  Unknown  Patient offered appointment:  No  Okay to leave a detailed message: Yes

## 2021-06-01 NOTE — TELEPHONE ENCOUNTER
Reviewed patient's chart, Noted that the fluticasone (FLOVENT HFA) 110 mcg/actuation inhaler, valacyclovir HCl (VALTREX ORAL) and albuterol (PROAIR HFA;PROVENTIL HFA;VENTOLIN HFA) 90 mcg/actuation inhaler are Historical medications.    Forwarded to provider for review and planning.
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
## 2021-06-01 NOTE — TELEPHONE ENCOUNTER
Who is calling:  Linda from [facility]   Reason for Call: Requesting Pre Op to be faxed over to [phone].   Date of last appointment with primary care: 9/16/19  Okay to leave a detailed message: Yes, if you need to reach out. Call [phone]

## 2021-06-02 VITALS — HEIGHT: 65 IN | BODY MASS INDEX: 22.82 KG/M2 | WEIGHT: 137 LBS

## 2021-06-02 VITALS — BODY MASS INDEX: 23.36 KG/M2 | WEIGHT: 140.4 LBS

## 2021-06-02 VITALS — BODY MASS INDEX: 23.89 KG/M2 | WEIGHT: 143.4 LBS | HEIGHT: 65 IN

## 2021-06-02 VITALS — WEIGHT: 137.5 LBS | BODY MASS INDEX: 22.88 KG/M2

## 2021-06-02 VITALS — BODY MASS INDEX: 24.6 KG/M2 | WEIGHT: 142.2 LBS

## 2021-06-02 VITALS — BODY MASS INDEX: 24.5 KG/M2 | WEIGHT: 141.6 LBS

## 2021-06-02 NOTE — TELEPHONE ENCOUNTER
Patient is calling and needs to cancel her surgery planned for 10/21.  She will call back to reschedule the surgery at a later date.

## 2021-06-02 NOTE — TELEPHONE ENCOUNTER
New Appointment Needed  What is the reason for the visit:    Same Date/Next Day Appt Request  What is the reason for your visit?: Patient just found out she has cancer and would like to see Meg as soon as possible.    Provider Preference: PCP only  How soon do you need to be seen?: tomorrow  Waitlist offered?: No  Okay to leave a detailed message:  Yes

## 2021-06-02 NOTE — TELEPHONE ENCOUNTER
Please schedule as phone appointment. 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  She will be having additional had neck surgery.  She has not been able to sleep well.  She has a family member who has BuSpar and they gave her  5 mg tablet and this helped her relax.  She had no side effects from this medication, was not dizzy or sleepy.  Refill given of BuSpar explained that she can take this twice daily regularly and may take a third dose as needed.  Count of 90 with 3 refills.  Explained that since this is on her medication list she should be able to get it while she is in the hospital if needed.  Follow-up if any concerns and if additional treatment is needed.  This note will

## 2021-06-02 NOTE — TELEPHONE ENCOUNTER
"Who is calling:  [facility]  Reason for Call:  Patient had a pre-op on 9/16/19 but her surgery was delayed and now scheduled for 10/21/19.   Pre-op report needs to be addended with a statement such as, \"This H & P is valid through 10/22/19.\" Once completed, fax to Linda at [phone]  Date of last appointment with primary care:  9/16/19   Okay to leave a detailed message: Yes      "

## 2021-06-02 NOTE — PROGRESS NOTES
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  She will be having additional had neck surgery.  She has not been able to sleep well.  She has a family member who has BuSpar and they gave her  5 mg tablet and this helped her relax.  She had no side effects from this medication, was not dizzy or sleepy.  Refill given of BuSpar explained that she can take this twice daily regularly and may take a third dose as needed.  Count of 90 with 3 refills.  Explained that since this is on her medication list she should be able to get it while she is in the hospital if needed.  Follow-up if any concerns and if additional treatment is needed.  This note will

## 2021-06-02 NOTE — TELEPHONE ENCOUNTER
Refill Approved    Rx renewed per Medication Renewal Policy. Medication was last renewed on 6/3/19.    Althea Ascencio, [facility] Triage/Med Refill 10/31/2019     Requested Prescriptions   Pending Prescriptions Disp Refills     lansoprazole (PREVACID) 30 MG capsule [Pharmacy Med Name: Lansoprazole Oral Capsule Delayed Release 30 MG] 30 capsule 1     Sig: Take 1 capsule by mouth daily.       GI Medications Refill Protocol Passed - 10/30/2019  6:30 PM        Passed - PCP or prescribing provider visit in last 12 or next 3 months.     Last office visit with prescriber/PCP: 9/20/2019 Meg Stallworth PA-C OR same dept: 9/20/2019 Meg Stallworth PA-C OR same specialty: 9/20/2019 Meg Stallworth PA-C  Last physical: 9/16/2019 Last MTM visit: Visit date not found   Next visit within 3 mo: Visit date not found  Next physical within 3 mo: Visit date not found  Prescriber OR PCP: Meg Stallworth PA-C  Last diagnosis associated with med order: 1. Gastroesophageal reflux disease without esophagitis  - lansoprazole (PREVACID) 30 MG capsule [Pharmacy Med Name: Lansoprazole Oral Capsule Delayed Release 30 MG]; Take 1 capsule by mouth daily.  Dispense: 30 capsule; Refill: 1    If protocol passes may refill for 12 months if within 3 months of last provider visit (or a total of 15 months).

## 2021-06-02 NOTE — TELEPHONE ENCOUNTER
We received a fax from [facility] regarding short term disability but she canceled her surgery with Dr. Weiss. I tried to reach out to patient but she is in the hospital until Friday.    Follow up with her next week to inform her we received it and to clarify if this was to go to another provider or advise her to cancel STD claim with [facility].

## 2021-06-03 VITALS
WEIGHT: 133 LBS | HEART RATE: 81 BPM | DIASTOLIC BLOOD PRESSURE: 82 MMHG | TEMPERATURE: 98.3 F | RESPIRATION RATE: 16 BRPM | HEIGHT: 64 IN | BODY MASS INDEX: 22.71 KG/M2 | OXYGEN SATURATION: 98 % | SYSTOLIC BLOOD PRESSURE: 128 MMHG

## 2021-06-03 VITALS
TEMPERATURE: 98.3 F | WEIGHT: 134.7 LBS | SYSTOLIC BLOOD PRESSURE: 127 MMHG | OXYGEN SATURATION: 96 % | DIASTOLIC BLOOD PRESSURE: 86 MMHG | HEIGHT: 64 IN | HEART RATE: 94 BPM | BODY MASS INDEX: 23 KG/M2

## 2021-06-03 VITALS — WEIGHT: 138.3 LBS | HEIGHT: 64 IN | BODY MASS INDEX: 23.61 KG/M2

## 2021-06-03 VITALS
WEIGHT: 135.31 LBS | SYSTOLIC BLOOD PRESSURE: 120 MMHG | BODY MASS INDEX: 23.1 KG/M2 | HEIGHT: 64 IN | DIASTOLIC BLOOD PRESSURE: 70 MMHG

## 2021-06-03 VITALS — HEIGHT: 65 IN | BODY MASS INDEX: 22.82 KG/M2 | WEIGHT: 137 LBS

## 2021-06-03 VITALS
BODY MASS INDEX: 24.17 KG/M2 | HEART RATE: 82 BPM | WEIGHT: 141.6 LBS | HEIGHT: 64 IN | SYSTOLIC BLOOD PRESSURE: 132 MMHG | DIASTOLIC BLOOD PRESSURE: 80 MMHG | TEMPERATURE: 98.3 F | OXYGEN SATURATION: 98 %

## 2021-06-03 VITALS
DIASTOLIC BLOOD PRESSURE: 71 MMHG | RESPIRATION RATE: 14 BRPM | HEART RATE: 93 BPM | SYSTOLIC BLOOD PRESSURE: 110 MMHG | OXYGEN SATURATION: 99 % | TEMPERATURE: 98.5 F | WEIGHT: 135 LBS | BODY MASS INDEX: 23.35 KG/M2

## 2021-06-03 VITALS
HEIGHT: 64 IN | BODY MASS INDEX: 24.07 KG/M2 | OXYGEN SATURATION: 95 % | HEART RATE: 86 BPM | WEIGHT: 141 LBS | SYSTOLIC BLOOD PRESSURE: 118 MMHG | DIASTOLIC BLOOD PRESSURE: 76 MMHG

## 2021-06-03 VITALS — BODY MASS INDEX: 22.47 KG/M2 | WEIGHT: 135 LBS

## 2021-06-03 VITALS — BODY MASS INDEX: 22.87 KG/M2 | WEIGHT: 137.44 LBS

## 2021-06-03 VITALS — HEIGHT: 65 IN | WEIGHT: 137.7 LBS | BODY MASS INDEX: 22.94 KG/M2

## 2021-06-03 VITALS
HEART RATE: 84 BPM | RESPIRATION RATE: 16 BRPM | DIASTOLIC BLOOD PRESSURE: 82 MMHG | SYSTOLIC BLOOD PRESSURE: 126 MMHG | BODY MASS INDEX: 23.35 KG/M2 | WEIGHT: 136.8 LBS | HEIGHT: 64 IN

## 2021-06-03 NOTE — PROGRESS NOTES
Selam Rene is a 59 y.o.female who is seen for new shoulder pain.  She noted worsening shoulder pain when she started physical therapy for her post neck surgery.  She had a mass removed from her posterior throat area and had 50 lymph nodes removed from the neck area.  Her biopsy came up negative and all nodes were negative as well.  This is wonderful news.  She does note some swelling and pain still in the neck and she is worried about the appearance of the scar.  She has been able to go off of her narcotic pain medication completely and is now only on over-the-counter meds as needed.  She had a feeding tube for 2 weeks and this became dislodged when she slept a different way one night just before when it was going to be removed.  She ended up removing this herself.  She has had no difficulty swallowing since then.  She follows up with her surgeon and has a new PET scan planned.  She has had a long history of cancers including breast cancer, previous cancerous lesion in the neck area.  She is considering doing some DNA testing.  She did have chemotherapy and radiation after her breast cancer.  Review of systems: Negative for dysphasia, globus, fever, cough, rash.  Otherwise as in HPI.  Patient injured it by recent neck surgery.  No other joint complaints.  Patient Active Problem List:  Past Medical History:   Diagnosis Date     Acute Sinusitis     Created by Conversion      Anxiety      Asthma      Basal cell carcinoma     nasal area, reconstruction.     Breast cancer (H)      Cyclic vomiting syndrome      Depression      Migraine        Current Outpatient Medications on File Prior to Visit   Medication Sig Dispense Refill     albuterol (PROAIR HFA;PROVENTIL HFA;VENTOLIN HFA) 90 mcg/actuation inhaler Inhale 2 puffs every 4 (four) hours as needed for wheezing. 1 Inhaler 2     albuterol (PROVENTIL) 2.5 mg /3 mL (0.083 %) nebulizer solution Take 3 mL (2.5 mg total) by nebulization every 6 (six) hours as needed for  "wheezing. 75 mL 0     busPIRone (BUSPAR) 5 MG tablet Take 1 tablet (5 mg total) by mouth 3 (three) times a day. 90 tablet 3     cetirizine (ZYRTEC) 10 MG tablet Take 1 tablet (10 mg total) by mouth daily. 30 tablet 0     cyclobenzaprine (FLEXERIL) 5 MG tablet Take 1 tablet (5 mg total) by mouth 3 (three) times a day as needed for muscle spasms. 30 tablet 0     diphenhydramine HCl (ZZZQUIL ORAL) Take 25 mg by mouth at bedtime as needed.       fluticasone propionate (FLOVENT HFA) 110 mcg/actuation inhaler Inhale 2 puffs daily. 1 Inhaler 1     ibuprofen (ADVIL,MOTRIN) 200 MG tablet Take 400 mg by mouth every 6 (six) hours as needed for pain.       lansoprazole (PREVACID) 30 MG capsule Take 1 capsule by mouth daily. 90 capsule 3     ondansetron (ZOFRAN-ODT) 4 MG disintegrating tablet Take 4-8 mg by mouth.       rizatriptan (MAXALT) 10 MG tablet Take 1 tablet (10 mg total) by mouth every 2 (two) hours as needed for migraine. May repeat in 2 hours if needed 10 tablet 11     valACYclovir (VALTREX) 500 MG tablet Take 1 tablet (500 mg total) by mouth daily. 30 tablet 0     [DISCONTINUED] diazePAM (VALIUM) 5 MG tablet Take 1 tablet (5 mg total) by mouth at bedtime. 10 tablet 0     No current facility-administered medications on file prior to visit.        OBJECTIVE:  /70 (Patient Site: Right Arm, Patient Position: Sitting, Cuff Size: Adult Regular)   Ht 5' 3.75\" (1.619 m)   Wt 135 lb 5 oz (61.4 kg)   BMI 23.41 kg/m    VITAL SIGNS: Afebrile, vital signs are normal.  Appearance: alert, oriented and in no acute distress.  HEENT: Sclera white, MMM.  HEART:Heart has a regular rate and rhythm. Respirations are not labored. Lungs are clear to auscultation, without rales, rhonchi or wheezes.   Skin: no edema , erythema or ecchymoses.   Right shoulder: Inspection of the right shoulder shows a obvious muscle loss at upper chest trapezius to alleviate her scapulae.  Range of motion full, empty can and liftoff positive for " weakness and pain in area of muscle loss.  No AC or SC tenderness, no loss of bicep tendon or tenderness down the bicep tendon.    DIAGNOSIS:  1. Labral tear of shoulder, right, initial encounter  MR Shoulder Without Contrast Right    MR Shoulder Without Contrast Right    diazePAM (VALIUM) 5 MG tablet   2. Strain of right levator scapulae muscle, sequela  MR Shoulder Without Contrast Right    MR Shoulder Without Contrast Right    diazePAM (VALIUM) 5 MG tablet       PLAN:    MRI of the shoulder and shoulder blade ordered.  There is a visible defect of the upper trapezius muscle.  Advised to decrease activity until MRI is completed.  She will restart physical therapy after MRI if there appears to be no major tear here.  Discussed swelling after neck surgery.  Encouraged her that she actually is doing very well and healing quickly.  Answered all her questions, she voices understanding.        Meg Stallworth, MS, PA-C

## 2021-06-03 NOTE — PROGRESS NOTES
MRI of shoulder appears normal, do have some tendinitis in the upper scapula area and subscapular area which appears to be causing your pain, you may continue to do physical therapy which can help for this concern, please call results to the patient or send a letter if not reachable by phone.

## 2021-06-03 NOTE — TELEPHONE ENCOUNTER
----- Message from Meg Stallworth PA-C sent at 11/22/2019  2:40 PM CST -----  MRI of shoulder appears normal, do have some tendinitis in the upper scapula area and subscapular area which appears to be causing your pain, you may continue to do physical therapy which can help for this concern, please call results to the patient or send a letter if not reachable by phone.

## 2021-06-04 VITALS
OXYGEN SATURATION: 97 % | HEIGHT: 64 IN | HEART RATE: 93 BPM | BODY MASS INDEX: 23.85 KG/M2 | DIASTOLIC BLOOD PRESSURE: 82 MMHG | WEIGHT: 139.7 LBS | SYSTOLIC BLOOD PRESSURE: 125 MMHG

## 2021-06-04 VITALS
HEIGHT: 64 IN | OXYGEN SATURATION: 96 % | DIASTOLIC BLOOD PRESSURE: 71 MMHG | BODY MASS INDEX: 23.71 KG/M2 | TEMPERATURE: 98.1 F | SYSTOLIC BLOOD PRESSURE: 120 MMHG | WEIGHT: 138.9 LBS | HEART RATE: 91 BPM

## 2021-06-04 VITALS
DIASTOLIC BLOOD PRESSURE: 88 MMHG | BODY MASS INDEX: 23.05 KG/M2 | HEIGHT: 64 IN | HEART RATE: 89 BPM | SYSTOLIC BLOOD PRESSURE: 128 MMHG | WEIGHT: 135 LBS | OXYGEN SATURATION: 96 %

## 2021-06-04 VITALS
HEIGHT: 64 IN | WEIGHT: 135 LBS | DIASTOLIC BLOOD PRESSURE: 70 MMHG | SYSTOLIC BLOOD PRESSURE: 120 MMHG | BODY MASS INDEX: 23.05 KG/M2

## 2021-06-04 VITALS
BODY MASS INDEX: 24.59 KG/M2 | SYSTOLIC BLOOD PRESSURE: 138 MMHG | HEART RATE: 87 BPM | OXYGEN SATURATION: 97 % | WEIGHT: 141 LBS | DIASTOLIC BLOOD PRESSURE: 86 MMHG

## 2021-06-04 NOTE — TELEPHONE ENCOUNTER
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 Josiah Castillo.  I attempted to reach Selam on her only listed number and the call does not go through.  I emailed Selam on her personal email listed and requested a call back at her earliest convenience to help schedule an appt per Dr. Castillo's request.  I provided my direct number.

## 2021-06-04 NOTE — TELEPHONE ENCOUNTER
New Appointment Needed  What is the reason for the visit:    Follow up- discuss short term disability  Provider Preference: PCP only  How soon do you need to be seen?: as soon as she is able to squeeze the patient in to her schedule   Waitlist offered?: No  Okay to leave a detailed message:  Yes
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
## 2021-06-04 NOTE — PROGRESS NOTES
HISTORY OF PRESENT ILLNESS  Patient comes in today for recheck. She did see an oral surgeon for her jaw lesion. 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 She underwent wide local excision with clear margins and a second procedure. Neck dissection was negative for metastatic spread. She has had pain in the area and wants to have the area checked.    REVIEW OF SYSTEMS  Review of Systems: a 10-system review was performed. Pertinent positives are noted in the HPI and on a separate scanned document in the chart.    PMH, PSH, FH and SH has documented in the EHR.      EXAM    CONSTITUTIONAL  General Appearance:  Normal, well developed, well nourished, no obvious distress  Ability to Communicate:  communicates appropriately.    HEAD AND FACE  Appearance and Symmetry:  Normal, no scalp or facial scarring or suspicious lesions.  Paranasal sinuses tenderness:  Normal, Paranasal sinuses non tender    ORAL CAVITY AND OROPHARYNX  Lips:  Normal.  Dental and gingiva:  Normal, No obvious dental or gingival disease.  Mucosa:  No evidence of local recurrence. There is scarring in the area of the retromolar trigone. .  Tongue:  Normal, Tongue mobile with no mucosal abnormalities  Hard and soft palate:  Normal, Hard and soft palate without cleft or mucosal lesions.  Oral pharynx:  Normal, Posterior pharynx without lesions or remarkable asymmetry.  Saliva:  Normal, Clear saliva.  Masses:  Normal, No palpable masses or pathologically enlarged lymph nodes.    NECK  Masses/lymph nodes:  Postoperative changes of neck dissection right neck..  Salivary glands:  Normal, Parotid and submandibular glands.  Trachea and larynx position:  Normal, Trachea and larynx midline.  Thyroid:  Normal, No thyroid abnormality.  Tenderness:  Normal, No cervical tenderness.  Suppleness:  Normal, Neck supple    NEUROLOGICAL  Speech pattern:  Normal, Proasaic    RESPIRATORY  Symmetry and Respiratory effort:  Normal, Symmetric chest movement and expansion with  no increased intercostal retractions or use of accessory muscles.     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    RECOMMENDATION  I advised obtaining an opinion from Medical Oncology regarding chemoradiation.     Josiah Castillo MD

## 2021-06-05 VITALS
HEART RATE: 96 BPM | HEIGHT: 64 IN | OXYGEN SATURATION: 96 % | DIASTOLIC BLOOD PRESSURE: 70 MMHG | BODY MASS INDEX: 23.39 KG/M2 | SYSTOLIC BLOOD PRESSURE: 116 MMHG | TEMPERATURE: 97.4 F | WEIGHT: 137 LBS

## 2021-06-05 VITALS
TEMPERATURE: 96.4 F | RESPIRATION RATE: 20 BRPM | BODY MASS INDEX: 24.02 KG/M2 | HEART RATE: 104 BPM | DIASTOLIC BLOOD PRESSURE: 68 MMHG | WEIGHT: 140.7 LBS | OXYGEN SATURATION: 96 % | HEIGHT: 64 IN | SYSTOLIC BLOOD PRESSURE: 128 MMHG

## 2021-06-05 NOTE — PROGRESS NOTES
HISTORY OF PRESENT ILLNESS  Patient reports that she felt what she thought was another tumor under the tongue. It then burst open and it was all pus. She reports that she has been drooling off and on for a year.     REVIEW OF SYSTEMS  Review of Systems: a 10-system review was performed. Pertinent positives are noted in the HPI and on a separate scanned document in the chart.    PMH, PSH, FH and SH has documented in the EHR.      EXAM    CONSTITUTIONAL  General Appearance:  Normal, well developed, well nourished, no obvious distress  Ability to Communicate:  communicates appropriately.    HEAD AND FACE  Appearance and Symmetry:  Normal, no scalp or facial scarring or suspicious lesions.    EARS  Clinical speech reception threshold:  Normal, able to hear normal speech.  Auricle:  Normal, Auricles without scars, lesions, masses.  External auditory canal:  Normal, External auditory canal normal.  Tympanic membrane:  Normal, Tympanic membranes normal without swelling or erythema.    NOSE (speculum or scope)  Architecture:  Normal, Grossly normal external nasal architecture with no masses or lesions.  Mucosa:  Normal mucosa, No polyps or masses.  Septum:  Normal, Septum non-obstructing.  Turbinates:  Normal, No turbinate abnormalities    ORAL CAVITY AND OROPHARYNX  Lips:  Normal.  Dental and gingiva:  Normal, No obvious dental or gingival disease.  Mucosa:  Normal, Moist mucous membranes.  Tongue:  Normal, Tongue mobile with no mucosal abnormalities  Hard and soft palate:  Normal, Hard and soft palate without cleft or mucosal lesions.  Oral pharynx:  Postoperative changes right oropharynx.  Saliva:  Normal, Clear saliva.  Masses:  Normal, No palpable masses or pathologically enlarged lymph nodes.    NECK  Masses/lymph nodes:  Normal, No worrisome neck masses or lymph nodes.  Salivary glands:  Normal, Parotid and submandibular glands.  Trachea and larynx position:  Normal, Trachea and larynx midline.  Thyroid:  Normal, No  thyroid abnormality.  Tenderness:  Normal, No cervical tenderness.  Suppleness:  Normal, Neck supple    NEUROLOGICAL  Speech pattern:  Normal, Proasaic    RESPIRATORY  Symmetry and Respiratory effort:  Normal, Symmetric chest movement and expansion with no increased intercostal retractions or use of accessory muscles.     IMPRESSION  Probable mucocele under tongue which ruptured. No evidence of disease or recurrence today.     RECOMMENDATION  I provided reassurance. Follow up as planned.    Josiah Castillo MD

## 2021-06-05 NOTE — CONSULTS
[facility] Hematology and Oncology Consult Note    Patient: Selam Rene  MRN: [id number]  Date of Service: 01/07/2020        Reason for Visit    I was consulted by Dr. 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  No high risk features are noted.  There is clearly no role for any adjuvant chemotherapy and I do not think any adjuvant radiation therapy is indicated.    Discussed small risk of recurrence and the need therefore for imaging and follow-up.    Would recommend repeat MRI of the neck and CT of the chest in April and follow-up after that.    Patient with history of early onset of breast cancer and also family history of cancer.  Will refer to genetics for consideration of genetic testing.    Plan: No adjuvant treatment is recommended  Follow-up in 3 months with repeat MRI of the neck and CT of the chest  Referral to genetics        ECOG Performance      Distress Assessment           Problem List    1. Cancer of minor salivary gland (H)  MR Soft Tissue Neck Without Contrast    CT Chest With Contrast    Ambulatory referral to Genetics   2. Malignant neoplasm of minor salivary gland (H)  Ambulatory referral to Genetics           CC: Meg Stallworth PA-C      ______________________________________________________________________________      Staging History    Cancer Staging  No matching staging information was  found for the patient.    History    Ms. Selam Rene is a 59-year-old with previous history of right breast cancer, asthma, anxiety, GERD and recent diagnosis of secretory cancer involving a minor saliva gland who is referred for discussion of any need for adjuvant treatment.    Presented last July with a bump in the back right of her mouth and no additional symptoms.  Exam revealed 1 cm soft mass with central opening in the right retromolar trigone with pus and blood being able to be expressed from the opening and no other neck masses noted.    Initial CT showed a periapical lucency near the right first mandibular molar.  MRI of the neck confirmed enhancing mass in the region of the right glossotonsillar sulcus versus anterior parapharyngeal space suspicious for neoplastic process with an irregular right level 2A lymph node.    Patient saw Dr. Posada at [facility] and underwent initial seasonal biopsy and then subsequent additional surgery in September and then October 2019 respectively.  Second surgery was wide local excision of right pharynx, right neck dissection, split-thickness skin graft from right thigh to the right pharynx, local tissue rearrangement and feeding tube placement.  No additional treatment recommended.    Pathology from her second surgery showed no additional residual cancer or lymph node involvement.    Patient recovering slowly from surgery.  Still with difficulty opening her mouth completely and also numbness in the right side of her neck and shoulder discomfort.    No headaches or dizziness.  No shortness of breath or cough.  No new bone or abdominal pain.  Appetite and weight are stable.    Past history of right breast cancer the age of 42 treated with bilateral mastectomy, adjuvant chemotherapy and radiation therapy.  No hormonal therapy.  Has history of basal cell cancer on 3 occasions.    No cancer history except for basal cell cancer in her mother in the immediate family.  2  aunts have had breast cancer.  No family history of ovarian cancer.      Past History  Past Medical History:   Diagnosis Date     Acute Sinusitis     Created by Conversion      Anxiety      Asthma      Basal cell carcinoma     nasal area, reconstruction.     Breast cancer (H)      Cyclic vomiting syndrome      Depression      Migraine      Past Surgical History:   Procedure Laterality Date     BLADDER SUSPENSION       NASAL RECONSTRUCTION      multiple mohs procedures and nasal reconstruction for BCC     [place]  DELIVERY [place]      Description:  Section;  Recorded: 2009;     [place] ENLARGE BREAST      Description: Breast Surgery Enlargement Procedure Bilateral;  Recorded: 2011;  Comments: , , .     [place] REMOVAL OF TONSILS,<11 Y/O      Description: Tonsillectomy;  Recorded: 2011;     [place] TOTAL ABDOM HYSTERECTOMY      Description: Total Abdominal Hysterectomy;  Recorded: 2011;  Comments: with oopherectomy     SIMPLE MASTECTOMY  2002    bilateral, history of breast cancer     Family History   Problem Relation Age of Onset     COPD Mother      Osteoporosis Mother      Depression Mother      Anxiety disorder Mother      Alcohol abuse Mother      Lung cancer Father      Stroke Father      Alcohol abuse Father      Liver disease Father      Diabetes Daughter      Depression Daughter      Breast cancer Maternal Aunt         Unknown age of onset     Breast cancer Paternal Aunt         Unknown age of onset     Stomach cancer Maternal great-grandfather      Leukemia Maternal Grandmother         Unknown age of onset     Brain cancer Maternal cousin         Unknown age of onset     Rectal cancer Maternal cousin         Unknown age of onset     Throat cancer Paternal cousin         Unknown age of onset     Brain cancer Paternal cousin         Unknown age of onset     Social History     Socioeconomic History     Marital status:      Spouse name: None     Number of children: None      Years of education: None     Highest education level: None   Occupational History     None   Social Needs     Financial resource strain: None     Food insecurity:     Worry: None     Inability: None     Transportation needs:     Medical: None     Non-medical: None   Tobacco Use     Smoking status: Never Smoker     Smokeless tobacco: Never Used   Substance and Sexual Activity     Alcohol use: Yes     Frequency: 2-4 times a month     Drinks per session: 1 or 2     Comment: 5-6/week     Drug use: No     Sexual activity: Never     Partners: Male   Lifestyle     Physical activity:     Days per week: None     Minutes per session: None     Stress: None   Relationships     Social connections:     Talks on phone: None     Gets together: None     Attends Mandae[employer] service: None     Active member of club or organization: None     Attends meetings of clubs or organizations: None     Relationship status: None     Intimate partner violence:     Fear of current or ex partner: None     Emotionally abused: None     Physically abused: None     Forced sexual activity: None   Other Topics Concern     None   Social History Narrative     None     Allergies    Allergies   Allergen Reactions     Sulfa (Sulfonamide Antibiotics)      hives         Review of Systems    General  General (WDL): Exceptions to WDL  ENT  ENT (WDL): Exceptions to WDL  Sinus Congestion/Drainage: Yes - Recent (Less than 3 months)  Respiratory  Respiratory (WDL): All respiratory elements are within defined limits  Cardiovascular  Cardiovascular (WDL): All cardiovascular elements are within defined limits  Endocrine  Endocrine (WDL): All endocrine elements are within defined limits  Gastrointestinal  Gastrointestinal (WDL): All gastrointestinal elements are within defined limits  Musculoskeletal  Musculoskeletal (WDL): Exceptions to WDL  Muscle pain or stiffness: Yes - Recent (Less than 3 months)  Neurological  Neurological (WDL): Exceptions to WDL  Headaches: Yes -  "Recent (Less than 3 months)  Dominant Hand: Right  Psychological/Emotional  Psychological/Emotional (WDL): Exceptions to WDL  Panic attacks: Yes - Recent (Less than 3 months)  Anxiety: Yes - Recent (Less than 3 months)  Hematological/Lymphatic  Hematological/Lymphatic (WDL): All hematological/lymphatic elements are within defined limits  Dermatological  Dermatologic (WDL): All dermatological elements are within defined limits  Genitourinary/Reproductive  Genitourinary/Reproductive (WDL): All genitourinary/reproductive elements are within defined limits  Reproductive (Females only)     Pain       Physical Exam    Recent Vitals 1/7/2020   Height 5' 3.5\"   Weight 134 lbs 11 oz   BSA (m2) 1.65 m2   /86   Pulse 94   Temp 98.3   Temp src 1   SpO2 96   Some recent data might be hidden       GENERAL: Alert and oriented to time place and person. Seated comfortably. In no distress.    HEAD: Atraumatic and normocephalic.    EYES: ELENA, EOMI.  No pallor.  No icterus.    Oral cavity: no mucosal lesion or tonsillar enlargement.    NECK: supple. JVP normal.  Induration from surgery in the right side of the neck with some swelling of the face on that side.  No thyroid enlargement.    LYMPH NODES: No palpable, cervical, axillary or inguinal lymphadenopathy.    CHEST: clear to auscultation bilaterally.  Resonant to percussion throughout bilaterally.  Symmetrical breath movements bilaterally.    CVS: S1 and S2 are heard. Regular rate and rhythm.  No murmur or gallop or rub heard.  No peripheral edema.    ABDOMEN: Soft. Not tender. Not distended.  No palpable hepatomegaly or splenomegaly.  No other mass palpable.  Bowel sounds heard.    EXTREMITIES: Warm.    SKIN: no rash, or bruising or purpura.  Has a full head of hair.    CNS: Nonfocal      Lab Results    No results found for this or any previous visit (from the past 168 hour(s)).    Imaging Results    No results found.      Signed by: Naveen Jc MD  "

## 2021-06-06 NOTE — TELEPHONE ENCOUNTER
----- Message from Meg Stallworth PA-C sent at 3/3/2020  6:45 AM CST -----  Labs are normal, with only mildly elevated total cholesterol and LDL, low fat diet and regular exercise can improve these levels, we will recheck in one year. please call results to the patient or send a letter if not reachable by phone.

## 2021-06-06 NOTE — PROGRESS NOTES
Surgery/Procedure: Partial phalangectomy distal phalanx of fifth toe, left foot    Special Equipment: TBD    Location: [facility]    Date: 03/30/2020    Time: 8:00am    Surgeon: Dr. Weiss    Assist: No    Length of Surgery: 60 minutes    OR Confirmed/ :  Petra De Los Santos on 03/03/20    Orders In:  Yes    Provider Team Notified:  Yes    Entered on [employer] Calendar:  Yes    Post Op: 04/07 and 04/14 @ [facility]

## 2021-06-06 NOTE — TELEPHONE ENCOUNTER
Who is calling:  Patient  Reason for Call:  Patient stated she works at [employer] at [place] and wants to know with the COVID-19 virus outbreak, should she stay home?  Date of last appointment with primary care: 3/2/20  Okay to leave a detailed message: Yes [phone]

## 2021-06-06 NOTE — TELEPHONE ENCOUNTER
Meg Stallworth is out of the office. I reviewed her chart and do not see a reason that she needs to remain home at this time.
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
## 2021-06-06 NOTE — TELEPHONE ENCOUNTER
Patient Returning Call  Reason for call:  lmtcb   Information relayed to patient:  Relayed msg and patient agrees. Patient has no further questions and will follow up as advised.  Patient has additional questions:  No  If YES, what are your questions/concerns:  Na   Okay to leave a detailed message?: No call back needed

## 2021-06-06 NOTE — PROGRESS NOTES
Labs are normal, with only mildly elevated total cholesterol and LDL, low fat diet and regular exercise can improve these levels, we will recheck in one year. please call results to the patient or send a letter if not reachable by phone.

## 2021-06-06 NOTE — TELEPHONE ENCOUNTER
Called to patient and relayed the message. Patient stated that isnt she considered high risk due to having asthma. Spoke with covering provider who added that patient is under the age of 65 and is not immunocompromised. If patients asthma is well controlled she does not think she would need to stay home at this time. Patient can ask employer their thoughts as well.    Patient requested PCP to advise when she returns to clinic. Patient is wanting a call back from pcp- patient will be at work from 11am-7pm and phone number she can be reached at is [phone]

## 2021-06-06 NOTE — PROGRESS NOTES
FOOT AND ANKLE SURGERY/PODIATRY Progress Note        ASSESSMENT:   Hypertrophy of bone fifth toe left foot  Tyloma fifth toe left foot        TREATMENT:  Debrided hyperkeratotic lesion fifth toe left foot.  The patient has been scheduled for a partial phalangectomy distal phalanx fifth toe left foot           HPI: Selam Rene presented to the clinic today complaining of a painful recurring corn between the fourth and  fifth toe left foot.  The patient indicated she has had this problem for several years.  She stated that the lesion is very painful particularly when wearing normal shoes.  It is extremely thick and difficult to treat.  She describes it as a sharp pain which is aggravated with weightbearing and ambulation.  She has not had any redness, swelling, drainage of bleeding.  She denies any trauma to her left foot.  She denies any other previous treatment.  She is very frustrated at the inability to prevent recurrence of this painful lesion.        Past Medical History:   Diagnosis Date     Acute Sinusitis     Created by Conversion      Anxiety      Asthma      Basal cell carcinoma     nasal area, reconstruction.     Breast cancer (H)      Cyclic vomiting syndrome      Depression      Migraine        Past Surgical History:   Procedure Laterality Date     BLADDER SUSPENSION       NASAL RECONSTRUCTION      multiple mohs procedures and nasal reconstruction for BCC     CT  DELIVERY ONLY      Description:  Section;  Recorded: 2009;     CT ENLARGE BREAST      Description: Breast Surgery Enlargement Procedure Bilateral;  Recorded: 2011;  Comments: , , .     CT REMOVAL OF TONSILS,<11 Y/O      Description: Tonsillectomy;  Recorded: 2011;     CT TOTAL ABDOM HYSTERECTOMY      Description: Total Abdominal Hysterectomy;  Recorded: 2011;  Comments: with oopherectomy     SIMPLE MASTECTOMY  2002    bilateral, history of breast cancer       Allergies   Allergen Reactions      Sulfa (Sulfonamide Antibiotics)      hives           Current Outpatient Medications:      albuterol (PROAIR HFA;PROVENTIL HFA;VENTOLIN HFA) 90 mcg/actuation inhaler, Inhale 2 puffs every 4 (four) hours as needed for wheezing., Disp: 1 Inhaler, Rfl: 2     albuterol (PROVENTIL) 2.5 mg /3 mL (0.083 %) nebulizer solution, Take 3 mL (2.5 mg total) by nebulization every 6 (six) hours as needed for wheezing., Disp: 75 mL, Rfl: 0     cyclobenzaprine (FLEXERIL) 5 MG tablet, Take 1 tablet (5 mg total) by mouth 3 (three) times a day as needed for muscle spasms., Disp: 30 tablet, Rfl: 0     diazePAM (VALIUM) 5 MG tablet, 1 tab 30 min before MRI., Disp: 2 tablet, Rfl: 0     diphenhydramine HCl (ZZZQUIL ORAL), Take 25 mg by mouth at bedtime as needed., Disp: , Rfl:      fluticasone propionate (FLOVENT HFA) 110 mcg/actuation inhaler, Inhale 2 puffs daily., Disp: 1 Inhaler, Rfl: 1     ibuprofen (ADVIL,MOTRIN) 200 MG tablet, Take 400 mg by mouth every 6 (six) hours as needed for pain., Disp: , Rfl:      lansoprazole (PREVACID) 30 MG capsule, Take 1 capsule by mouth daily., Disp: 90 capsule, Rfl: 3     ondansetron (ZOFRAN) 4 MG tablet, Take 1 tablet (4 mg total) by mouth daily as needed for nausea., Disp: 30 tablet, Rfl: 1     ondansetron (ZOFRAN-ODT) 4 MG disintegrating tablet, Take 4-8 mg by mouth., Disp: , Rfl:      rizatriptan (MAXALT) 10 MG tablet, Take 1 tablet (10 mg total) by mouth every 2 (two) hours as needed for migraine. May repeat in 2 hours if needed, Disp: 10 tablet, Rfl: 11     valACYclovir (VALTREX) 500 MG tablet, Take 1 tablet (500 mg total) by mouth daily., Disp: 30 tablet, Rfl: 0    Family History   Problem Relation Age of Onset     COPD Mother      Osteoporosis Mother      Depression Mother      Anxiety disorder Mother      Alcohol abuse Mother      Lung cancer Father      Stroke Father      Alcohol abuse Father      Liver disease Father      Diabetes Daughter      Depression Daughter      Breast cancer  Maternal Aunt         Unknown age of onset     Breast cancer Paternal Aunt         Unknown age of onset     Stomach cancer Maternal great-grandfather      Leukemia Maternal Grandmother         Unknown age of onset     Brain cancer Maternal cousin         Unknown age of onset     Rectal cancer Maternal cousin         Unknown age of onset     Throat cancer Paternal cousin         Unknown age of onset     Brain cancer Paternal cousin         Unknown age of onset       Social History     Socioeconomic History     Marital status:      Spouse name: Not on file     Number of children: Not on file     Years of education: Not on file     Highest education level: Not on file   Occupational History     Not on file   Social Needs     Financial resource strain: Not on file     Food insecurity     Worry: Not on file     Inability: Not on file     Transportation needs     Medical: Not on file     Non-medical: Not on file   Tobacco Use     Smoking status: Never Smoker     Smokeless tobacco: Never Used   Substance and Sexual Activity     Alcohol use: Yes     Frequency: 2-4 times a month     Drinks per session: 1 or 2     Comment: 5-6/week     Drug use: No     Sexual activity: Never     Partners: Male   Lifestyle     Physical activity     Days per week: Not on file     Minutes per session: Not on file     Stress: Not on file   Relationships     Social connections     Talks on phone: Not on file     Gets together: Not on file     Attends Mu-ism service: Not on file     Active member of club or organization: Not on file     Attends meetings of clubs or organizations: Not on file     Relationship status: Not on file     Intimate partner violence     Fear of current or ex partner: Not on file     Emotionally abused: Not on file     Physically abused: Not on file     Forced sexual activity: Not on file   Other Topics Concern     Not on file   Social History Narrative     Not on file       10 point Review of Systems is  negative       Vitals:    03/10/20 1128   BP: 118/80   Pulse: 64   Resp: 14       BMI= There is no height or weight on file to calculate BMI.    OBJECTIVE:  General appearance: Patient is alert and fully cooperative with history & exam.  No sign of distress is noted during the visit.  Vascular: Dorsalis pedis and posterior tibial pulses are palpable. There is pedal hair growth bilaterally.  CFT < 3 sec from anterior tibial surface to distal digits bilaterally. There is no appreciable edema noted.  Dermatologic: There is a thick hyperkeratotic nucleated lesion on the medial aspect of the distal interphalangeal joint fifth toe left foot.  Turgor and texture are within normal limits. No coloration or temperature changes. No other primary or secondary lesions noted.  Neurologic: All epicritic and proprioceptive sensations are grossly intact bilaterally.  Musculoskeletal: All active and passive ankle, subtalar, midtarsal, and 1st MPJ range of motion are grossly intact without pain or crepitus, with the exception of none. Manual muscle strength is within normal limits bilaterally. All dorsiflexors, plantarflexors, invertors, evertors are intact bilaterally. Tenderness present to fifth toe left foot on palpation.  No tenderness to fifth toe left foot with range of motion. Calf is soft/non-tender without warmth/induration  Imaging:       No results found.         TREATMENT:  Debrided the hyperkeratotic lesion fifth toe left foot today.        Noah Weiss; TITA  [facility] Foot & Ankle Surgery/Podiatry

## 2021-06-06 NOTE — TELEPHONE ENCOUNTER
Called patient and let message that her surgery scheduled on March 30 has been canceled with the post ops due to the COVID 19.

## 2021-06-06 NOTE — PROGRESS NOTES
2/26/2020    Referring Provider: Meg Stallworth, *    Present for Today's Visit: Selam    Presenting Information:   I met with Selam Rene today for genetic counseling at [facility] to discuss her personal and family history of brain cancer.  She is here today to review this history, cancer screening recommendations, and available genetic testing options.    Personal History:  Selam is a 60 y.o. female. 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 She underwent a resection and right radical neck dissection on 10/21/2019 with clear margins. She met with Dr. Jc on 01/07/2020 to and no further treatment was recommended. Plan is for imaging next week an follow-up with Dr. Jc in April.     Selam also has a history of right triple negative breast cancer diagnosed at age 42. She underwent bilateral mastectomies and adjuvant chemotherapy and radiation. Selam reports that she had genetic testing at the time of her breast cancer diagnosis in 2002 for BRCA mutation, and she reports this testing was negative. Selam has also had 3 basal cell carcinomas from 2018 to 2019; one on her nose, one between her upper lip and nose and one on her arm.    Selam's medical history also includes GERD, diverticulosis (s/p colon resection in 2015), degenerative disc disease, and . She is s/p total abdominal hysterectomy in 2003 due to abnormal uterine bleeding.       She had her first menstrual period at age 13, her first child at age 17, and went through surgical menopause at age 43.  Selam has had her ovaries, fallopian tubes and uterus in removed as stated above.  She reports a history of oral contraceptive use from age [age] and that she has never been on hormone replacement therapy.      She no longer receives pap smears. Due to her breast surgery, she no longer  receives breast cancer screening. She began having colonoscopies at the age of 42 due to rectal bleeding. She reports that she has had 4 or 5 total colonoscopies for gastrointestinal symptoms and no polyps needed to be removed. She reports that her most recent colonoscopy was in 2019 and no polyps were removed. She cannot remember when follow-up was recommended.  She does not regularly do any other cancer screening at this time.  Selam reported no tobacco use, and social (about 5 drinks/weekend) alcohol use.    Family History: (Please see scanned pedigree for detailed family history information)  Children/Siblings    Selam has a daughter, age 42, who Selam reports had pre-cervical cancer cells around age 20. This daughter has two daughters and a son. On of these daughter (granddaughter to Selam) as Bechet's syndrome.     Selam has a daughter, age 32, with no reported cancer history who has a history of type 1 diabetes, and Fahr's disease with hypoparathyroidism, hypothyroidism, Hashimoto's. Selam also reports that this daughter had kidney stones at age 5.     Selam has a son, age 25, who has no reported cancer history and a history of kidney stones at age 16.    Selam has a brother who passed away at age 3 from a heart condition.    Selam has a brother, age 65, who has COPD and a history of a basal cell carcinoma on his nose at age 65.    Selam has a brother, age 63, with no reported cancer history and is s/p colon resection due to diverticulosis.    Selam has a sister, age 58, with no reported cancer history and a history of benign breast lesions. This sister is s/p NHUNG in her late 30's from menstrual issues and s/p colon resection for diverticulosis.    Selam has a sister, age 56, with no reported cancer history.    Selam has a maternal half-brother, age 48, with a history of a basal cell carcinoma on his arm and a squamous cell carcinoma on his face.     Selam has four nephews and two  nieces; all reportedly healthy with no cancer histories.   Maternal    Selam's mother passed away at age 60 from emphysema with smoking history. Selam's mother had a basal cell carcinoma on her nose around age 55.    Selam has a maternal aunt who passed away in her 60's with a history of breast cancer diagnosed in her 40's.    Selam has a maternal aunt who passed away at age 65 from complications caused by a bladder infection with no reported cancer history.    Selam has a maternal uncle who passed away at age 60 from suicide with no reported cancer history.    Selam's maternal grandmother passed away at age 80 with a history of leukemia diagnosed at an unknown age.    Selam's maternal grandfather passed away at age 88 from age-related health issues with no reported cancer history.  Paternal    Selam's father passed away at age 66 due to complications from his alcoholism with no reported cancer history.    Selam has a number of paternal aunts and uncles who passed away at unknown ages from unknown causes with no reported cancer histories.     Selam has a paternal aunt who passed away in her 70's with a history of breast cancer diagnosed in her 40's.    Selam has a paternal first-cousin, age 65, with a history of throat cancer recently diagnosed. He was a smoker.    Selam has a paternal first-cousin who passed away in her 50's from a brain cancer diagnosed in her 50's.    Selam has a paternal first-cousin who passed away in her 60's from colon cancer diagnosed in her 60's. This cousin has a son who a history of a brain cancer.    Selam has a paternal first-cousin, age 72, who has a history of colon and liver cancer diagnosed in her 70's.    Selam's paternal grandmother passed away in her old age from age-related health issues and no reported cancer history.    Selam's paternal grandfather passed away at an unknown age with a history of lung cancer. He was a smoker.    Her maternal  ethnicity is . Her paternal ethnicity is .  There is no known Ashkenazi Orthodoxy ancestry on either side of her family. There is no reported consanguinity.    Discussion:    Selam's personal and family history of breast cancer is suggestive of a hereditary cancer syndrome.    We reviewed the features of sporadic, familial, and hereditary cancers. In looking at Selam's family history, it is possible that a cancer susceptibility gene is present due to her early-onset triple negative breast cancer, her maternal aunt's early-onset breast cancer, and her paternal aunt's early-onset breast cancer.    We discussed the natural history and genetics of hereditary breast cancers and hereditary cancer syndromes in general. A detailed handout regarding the information we discussed was provided to Selam at the end of our appointment today and can be found in the after visit summary. Topics included: inheritance pattern, cancer risks, cancer screening recommendations, and also risks, benefits and limitations of testing.    We reviewed that the most common cause of hereditary breast cancer is Hereditary Breast and Ovarian Cancer (HBOC) syndrome, which is caused by mutations in the genes BRCA1 and BRCA2. BRCA1 and BRCA2 are two genes that increase the risk for breast and ovarian cancers, among others. Women who inherit a BRCA mutation have a 50 to 85% lifetime risk of breast cancer and a 15 to 45% lifetime risk of ovarian cancer. This is higher than the general population lifetime risks of 12% for breast cancer and less than 2% for ovarian cancer. Men with BRCA gene mutations have up to a 7% risk of breast cancer and 20% risk of prostate cancer. Other cancers, such as pancreatic cancer and melanoma, have also been associated with BRCA mutations.    Based on her personal and family history, Selam meets current National Comprehensive Cancer Network (NCCN) criteria for genetic testing of high penetrance breast  and/or ovarian cancer susceptibility genes.      We discussed that there are additional genes that could cause increased risk for breast and  cancer. As many of these genes present with overlapping features in a family and accurate cancer risk cannot always be established based upon the pedigree analysis alone, it would be reasonable for Selam to consider panel genetic testing to analyze multiple genes at once.    We discussed Selam's family history of colon cancer and the genes associated with increased colon cancer risk. We also discussed her family history of brain cancers. I explained that without knowing the specific type of brain cancers/tumors, accurate risk assessment is difficult. I explained that in some cases, brain cancers can have an inherited genetic component. We discussed genetic testing option for the cancers in Selam and her family suggestive of a hereditary component. We discussed CustomNext-Cancer (CancerNext + polyposis genes AXIN2, MSH3, and NTHL1) which covers genes related to increased risk for breast, gynecologic, and gastrointestinal cancers as well as CancerNext-Expanded which covers genes related to increased risk for breast, gynecologic, gastrointestinal, neuroendocrine, renal, and brain cancers. Selam expressed wanting to learn as much information as possible from genetic testing. After our discussion, Selam opted to proceed with genetic testing via the CancerNext-Expanded panel through [facility].  Genetic testing is available for 67 genes associated with increased risk for many different cancers: CancerNext-Expanded (AIP, ALK, APC, ANNE MARIE, BAP1, BARD1, BLM, BRCA1, BRCA2, BRIP1, BMPR1A, CDH1, CDK4, CDKN1B, CDKN2A, CHEK2, DICER1, EPCAM, FANCC, FH, FLCN, GALNT12, GREM1, HOXB13, MAX, MEN1, MET, MITF, MLH1, MRE11A, MSH2, MSH6, MUTYH, NBN, NF1, NF2, PALB2, PHOX2B, PMS2, POLD1, POLE, POT1, BWKGN2C, PTCH1, PTEN, RAD50, RAD51C, RAD51D, RB1, RET, SDHA, SDHAF2, SDHB, SDHC, SDHD,  SMAD4, SMARCA4, SMARCB1, SMARCE1, STK11, SUFU, [id number], TP53, TSC1, TSC2, VHL, and XRCC2).  We discussed that many of the genes in the CancerNext-Expanded panel are associated with specific hereditary cancer syndromes and have published management guidelines:  Hereditary Breast and Ovarian Cancer syndrome (BRCA1, BRCA2), Pond syndrome (MLH1, MSH2, MSH6, PMS2, EPCAM), Familial Adenomatous Polyposis (APC), Hereditary Diffuse Gastric Cancer (CDH1), Familial Atypical Multiple Mole Melanoma syndrome (CDK4, CDKN2A), Juvenile Polyposis syndrome (BMPR1A, SMAD4), Cowden syndrome (PTEN), Li Fraumeni syndrome (TP53), Peutz-Jeghers syndrome (STK11), MUTYH Associated Polyposis (MUTYH), Hereditary Leiomyomatosis and Renal Cell Cancer (FH), Anns-Npom-Msyv (FLCN), Hereditary Papillary Renal Carcinoma (MET), Hereditary Paraganglioma and Pheochromocytoma syndrome (SDHA, SDHAF2, SDHB, SDHC, SDHD), Multiple Endocrine Neoplasia type 2 (RET), Tuberous sclerosis complex (TSC1, TSC2), Von Hippel-Lindau disease (VHL), Neurofibromatosis type 1 (NF1), Neurofibromatosis type 2 (NF1), Multiple Endocrine Neoplasia type 1 (MEN1), Multiple Endocrine Neoplasia type 4 (CDKN1B), Gorlin syndrome (SUFU, PTCH1), and Hallman complex (ANXZH4D).  The ANNE MARIE, BRIP1, CHEK2, GREM1, NBN, PALB2, POLD1, POLE, RAD51C, and RAD51D genes are associated with increased cancer risk and have published management guidelines for certain cancers.    The remaining genes (AIP, ALK, BAP1, BARD1, BLM, DICER1, FANCC, GALNT12, HOXB13, MRE11A, MAX, MET, MITF, PHOX2B, POT1, RAD50, SMARCA4,SMARCB1, SMARCE1, [id number], and XRCC2) are associated with increased cancer risk and may allow us to make medical recommendations when mutations are identified.    Selam was provided with a detailed brochure from [facility] explaining the CancerNext-Expanded testing.  Consent was obtained and genetic testing for CancerNext-Expanded was sent to [facility].     Medical Management:  For Selam, we reviewed that the information from genetic testing may determine:    additional cancer screening for which Selam may qualify (i.e. more frequent colonoscopies, more frequent dermatologic exams, etc.),    and targeted chemotherapies for Selam if she were to develop certain cancers in the future (i.e. immunotherapy for individuals with Pond syndrome, PARP inhibitors, etc.).     These recommendations and possible targeted chemotherapies will be discussed in detail once genetic testing is completed.     Plan:  1) Today Selam elected to proceed with CancerNext-Expanded genetic testing (67 genes) through [facility].  2) This information should be available in approximately 4 weeks.  3) Selam will be contacted by our scheduling department to set up a result disclosure appointment either in person or over the phone.     Face to face time: 55 minutes    Jade García MS, [facility]  Licensed Genetic Counselor  [facility]  [phone]

## 2021-06-06 NOTE — TELEPHONE ENCOUNTER
"She would like to r/s surgery with Dr. Weiss that she had to cancel back on 10/21/19. Please advise if office visit is needed prior to scheduling surgery for 03/30/20. If not, please enter order for surgery.    07/24/19 visit notes:  \"I recommended a partial phalangectomy of the fifth toe left foot to prevent recurrence of the painful hyperkeratotic lesion.\"  "

## 2021-06-07 NOTE — PROGRESS NOTES
4/2/2020    Referring Provider: Dr. Jc    Presenting Information:  I spoke with Selam over the phone today to discuss her genetic testing results. Her blood was drawn on 2/26/2020.  CancerNext-Expanded testing was ordered from [facility]. This testing was done because of her personal and family history of breast cancer and family history of colon and brain cancers and leukemia.    Genetic Testing Results: POSITIVE - CARRIER  Selam is POSITIVE for one MUTYH mutation, meaning that she is a CARRIER for MUTYH-Associated Polyposis (MAP). Specifically her mutation is called p.Y179C (also known as c.536A>G and p.Y165C). We discussed that this mutation is associated with a slightly increased risk for colon cancer. No other mutations were found in the MUTYH gene.    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 This variant is called p.G265R (also known as c.793G>A).  No other variants or mutations were found in the RAD51D gene. Given the uncertain significance of this result, medical management decisions should NOT be made based on this test result alone.    Of note, Selam is negative for pathogenic (harmful) mutations in the AIP, ALK, APC, ANNE MARIE, BAP1, BARD1, BLM, BRCA1, BRCA2, BRIP1, BMPR1A, CDH1, CDK4, CDKN1B, CDKN2A, CHEK2, DICER1, EPCAM, FANCC, FH, FLCN, GALNT12, GREM1, HOXB13, MAX, MEN1, MET, MITF, MLH1, MRE11A, MSH2, MSH6, NBN, NF1, NF2, PALB2, PHOX2B, PMS2, POLD1, POLE, POT1, EWBIV6X, PTCH1, PTEN, RAD50, RAD51C, RAD51D, RB1, RET, SDHA, SDHAF2, SDHB, SDHC, SDHD, SMAD4, SMARCA4, SMARCB1, SMARCE1, STK11, SUFU, FAGS587, TP53, TSC1, TSC2, VHL, and XRCC2 genes. No pathogenic (harmful) mutations were found in any of the remaining 66 genes analyzed. This test involved sequencing and deletion/duplication analysis of all genes with the exceptions of EPCAM and GREM1 (deletions/duplications only) and MITF  (sequencing only).    Testing did not detect an identifiable mutation associated with Hereditary Breast and Ovarian Cancer syndrome (BRCA1, BRCA2), Pond syndrome (MLH1, MSH2, MSH6, PMS2, EPCAM), Familial Adenomatous Polyposis (APC), Hereditary Diffuse Gastric Cancer (CDH1), Familial Atypical Multiple Mole Melanoma syndrome (CDK4, CDKN2A), Juvenile Polyposis syndrome (BMPR1A, SMAD4), Cowden syndrome (PTEN), Li Fraumeni syndrome (TP53), Peutz-Jeghers syndrome (STK11), MUTYH Associated Polyposis (MUTYH),  Hereditary Leiomyomatosis and Renal Cell Cancer (FH), Ycly-Itmk-Zhib (FLCN), Hereditary Papillary Renal Carcinoma (MET), Hereditary Paraganglioma and Pheochromocytoma syndrome (SDHA, SDHAF2, SDHB, SDHC, SDHD), Multiple Endocrine Neoplasia type 2 (RET), Tuberous sclerosis complex (TSC1, TSC2), Von Hippel-Lindau disease (VHL), Neurofibromatosis type 1 (NF1), Neurofibromatosis type 2 (NF1), Multiple Endocrine Neoplasia type 1 (MEN1), Multiple Endocrine Neoplasia type 4 (CDKN1B), Gorlin syndrome (SUFU, PTCH1), or Hallman complex (MLGSA2X).    MUTYH  Cancer Risks:   We reviewed that having only one MUTYH mutation means that an individual is a carrier for MAP. Approximately 1 in 100 (1%) individuals in the general population are carriers of MAP (carry a single mutation in the MUTYH gene).      Carriers of MAP have a very slightly increased risk of colon cancer (which could be as high as twice the population risk of 5-6%).     Several studies have also suggested that women who carry one MUTYH mutation may be at some increased risk for breast cancer, though additional research is needed to clarify this potential risk.    We discussed that in comparison, individuals with MAP (who have two MUTYH mutations) can have hundreds to thousands of polyps, but most have fewer than 100. Individuals with MAP have up to an 80% risk of developing colon cancer by age 70 if not treated. MAP is also associated with an increased risk of  duodenal cancer.      Cancer Screening and Prevention:  The following screening is recommended for individuals who have one mutation in the MUTYH gene, per current National Comprehensive Cancer Network (NCCN) guidelines:    Individuals who have a first degree relative (parent, child, sibling) with colon cancer should consider have a colonoscopy every five years starting at 40 (or 10 years before their relative's age of diagnosis, whichever is first).    It is uncertain if a specialized screening plan is necessary for those individuals who only have a second degree relative with colon cancer or do not have a family history of colon cancer.  We discussed the importance of keeping in contact with me, at least annually, to determine if guidelines have changed, as this may change how we approach colon screening for her in the future.     There are currently no other specific screening recommendations for other cancers potentially associated with one MUTYH mutation.    RAD51D  Interpretation:  We discussed several different interpretations of this inconclusive test result. It is not clear if this variant in the RAD51D gene is associated with increased cancer risk.  1. This variant may be a benign change that does not increase cancer risk.  2. This variant may be a harmful mutation that causes an increased risk for ovarian and female breast cancer.    We discussed that known pathogenic (harmful) mutations in the RAD51D gene have been associated with moderately increased risk for ovarian cancer (between 10-12% lifetime risk) female breast cancer, and prostate cancer.     I reiterated that at this time, the variant of uncertain significance identified on Selam's testing is NOT considered a pathogenic (harmful) mutation and her screening/management recommendations will not change based on this result alone.    The laboratory is working to determine if this variant is harmful or benign, and they will contact me if it is  reclassified. If this variant is determined to be a benign change, there may be a different gene or combination of genes and environment that are associated with the cancers in Selam and/or her relatives.      It is also important to consider that her other relatives with cancer history may have had a mutation in one of the genes tested and she did not inherit it.  There is also a small possibility that there is a mutation in one of these genes, and we could not find it with our current testing methods.     Other screening based on family history:    Selam should continue to follow her oncology team's recommendations for the treatment and follow-up for her cancer history.    Selam s close female relatives remain at increased risk for breast cancer given their family history. Breast cancer screening is generally recommended to begin approximately 10 years younger than the earliest age of breast cancer diagnosis in the family, or at age 40, whichever comes first. In this family, screening may begin at age 32. Breast screening options should be discussed with an individual's primary care provider and a genetic counselor, to determine at what age to begin screening, what screening is appropriate, and if additional screening (such as breast MRI) is necessary based on personal/family history factors.     We discussed that Selam's close relative likely have some increased risk for skin cancers such as basal cell carcinomas just given her and her mother's and brother's histories.  Selam's close relatives are encouraged to discuss this history with their care providers.      Other population cancer screening options, such as those recommended by the American Cancer Society and the National Comprehensive Cancer Network (NCCN), are also appropriate for Selam and her family. These screening recommendations may change if there are changes to Selam's personal and/or family history. Final screening recommendations  should be made by each individual's managing physician.    Inheritance/Implications for Family Members:  We reviewed the autosomal recessive inheritance of MAP. Individuals with MAP have a mutation within both copies of the MUTYH gene (two mutations, one that was inherited from each parent). When both parents are carriers for MAP, they have a 25% chance of having a child who inherited two mutations (affected with MAP), a 50% chance of having a child who inherited one mutation (carrier of MAP, with small increased risk for colon cancer), and a 25% chance of having a child who inherited neither mutation (unaffected; not a carrier). These chances apply to each pregnancy.     We discussed that testing for MUTYH mutations in the father of Selam s children is available, which would be helpful in determining risk for their children.  If Selam ferro childrens' fathers are a carrier, the above risks apply.  If they are not, their children would, at most, be carriers, and would just need to be tested for the mutation found in Selam. If Selam ferro childrens' fathers chooses not to have testing, her children should have full gene sequencing and deletion/duplication analysis of the MUTYH gene to best clarify their risk.     Genetic counseling is also recommended for Selam s siblings, as they have at least a 50% chance to carry the same MUTYH mutation identified in Selam. It will likely be recommended that they consider comprehensive testing of the MUTYH gene, though, as it is possible that they could have MAP or carry a mutation in the MUTYH gene that is different than the one identified in Selam. I would be happy to see Selam s family members for testing.     We reviewed the autosomal dominant inheritance of the variant of uncertain significance in the RAD51D gene.  We discussed that Selam has a 50% chance to pass this variant to each of her children.   Likewise, each of her siblings has a 50% risk of having the same  variant. Because it is unclear what, if any, risk is associated with this variant, clinical genetic testing for this RAD51C variant alone is not recommended for relatives.    Summary:  Selam has a single pathogenic MUTYH mutation and a variant of uncertain significance in her RAD51D gene; neither of which explain her personal and family history of cancer. Because of that, it is important that she continue with cancer screening based on her personal and family history as discussed above.    Genetic testing is rapidly advancing, and new cancer susceptibility genes will most likely be identified in the future. Therefore, I encouraged Selam to contact me annually or if there are changes in her personal or family history. This may change how we assess her cancer risk, screening, and the testing we would offer.    Plan:  1.  A copy of her results will be mailed to Selam.    2. She plans to follow-up with her oncology care team and her primary care providers.  3. She should contact me annually, or sooner if her family history changes.  4. I will contact Selam if the laboratory informs me that this VUS has been reclassified.  This may change screening and testing recommendations for Selam and her relatives.    If Selam has any further questions, I encouraged her to contact me at [phone].      Time spent over the phone: 20 minutes    Jade García MS, [facility] [place], [place]  Licensed Genetic Counselor  [facility]  [phone]

## 2021-06-07 NOTE — TELEPHONE ENCOUNTER
Who is requesting the letter?  Patient  Why is the letter needed? Patient stated that she needs a letter for work. Patient stated that Meg Stallworth PA-C and the patient had agreed for the patient to be off work for 30 days to start with. Please see telephone encounter 3/18/2020.  How would you like this letter returned? Mail  Okay to leave a detailed message? Yes  [phone] (Patient stated that must dial 1 first the phone number)

## 2021-06-07 NOTE — TELEPHONE ENCOUNTER
Who is calling:  Selam  Reason for Call:  Patient needs a copy of her FMLA form that Meg completed.  She will need to pick it up at the .  Please patient when it is ready.  She needs to do this as soon as possible to get her unemployment payment.  Date of last appointment with primary care: Virtual Visit 4/13/20.  Okay to leave a detailed message: Yes

## 2021-06-07 NOTE — TELEPHONE ENCOUNTER
Forms Request  Name of form/paperwork: Other:  The patient does not know type of form, thinks it is FMLA. The patient states the original date to return to work was 4/19/20 which the patient states has to be extended indefinitely.    Have you been seen for this request:   Do we have the form: The patient's employer -[employer] will be faxing today  When is form needed by: As soon as possible   How would you like the form returned:  The patient states the fax # will be on the form.The patient does not know the fax#.   Patient Notified form requests are processed in 3-5 business days: Yes    Okay to leave a detailed message? Yes  The patient states she cant receive calls from a land line. The patient states Meg Stallworth PA-C may have to use her personal cell to call.

## 2021-06-07 NOTE — TELEPHONE ENCOUNTER
Phone call to Selam as she had left a message both on my voicemail and with one of the cancer care nurses looking for results. I left a message explaining that she is on my schedule to discuss her genetic testing results at 8am tomorrow (4/2) and I will plan on calling her at that time. I also shared that she could call me back yet this afternoon if she is available.     Jade García MS, [facility] [place]  Licensed Genetic Counselor  [facility]  [phone]

## 2021-06-07 NOTE — PROGRESS NOTES
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  CT of the chest shows no evidence of metastatic disease.  MRI also shows no evidence of residual disease.  Patient reassured.  We will continue with observation.  We will consider repeat imaging in 6 months.    Reviewed the positive genetic test.  She did have good discussion the .  Understands that she should have colonoscopy every 5 years.  She has had bilateral mastectomy.  She had no external questions about this.    Plan: Follow-up in 3 months    Measurable disease: None postoperatively        ECOG Performance   ECOG Performance Status: 0    Distress Assessment  Distress Assessment Score: No distress    Pain           Problem List    No diagnosis found.     CC: Meg Stallworth PA-C    ______________________________________________________________________________    History of Present Illness    Ms. Selam Rene is spoken to for a telephone visit.  She was seen 3 months ago.  She is been feeling fine, just a little anxious about the ongoing crisis with the novel coronavirus pandemic.  No headaches or dizziness.  No pain  or soreness in the mouth or throat.  No change with breathing.  No new bone or abdominal pain.  Activity level is unchanged.    Pain Status  Currently in Pain: No/denies    Review of Systems    Constitutional  Constitutional (WDL): Exceptions to WDL  Neurosensory  Neurosensory (WDL): All neurosensory elements are within defined limits  Cardiovascular  Cardiovascular (WDL): All cardiovascular elements are within defined limits  Pulmonary  Respiratory (WDL): Within Defined Limits  Gastrointestinal  Gastrointestinal (WDL): All gastrointestinal elements are within defined limits  Genitourinary  Genitourinary (WDL): All genitourinary elements are within defined limits  Integumentary  Integumentary (WDL): All integumentary elements are within defined limits  Patient Coping  Patient Coping: Accepting  Distress Assessment  Distress Assessment Score: No distress  Accompanied by       Past History  Past Medical History:   Diagnosis Date     Acute Sinusitis     Created by Conversion      Anxiety      Asthma      Basal cell carcinoma     nasal area, reconstruction.     Breast cancer (H)      Cyclic vomiting syndrome      Depression      Migraine          Past Surgical History:   Procedure Laterality Date     BLADDER SUSPENSION       NASAL RECONSTRUCTION      multiple mohs procedures and nasal reconstruction for BCC     GA  DELIVERY ONLY      Description:  Section;  Recorded: 2009;     GA ENLARGE BREAST      Description: Breast Surgery Enlargement Procedure Bilateral;  Recorded: 2011;  Comments: , , .     GA REMOVAL OF TONSILS,<11 Y/O      Description: Tonsillectomy;  Recorded: 2011;     GA TOTAL ABDOM HYSTERECTOMY      Description: Total Abdominal Hysterectomy;  Recorded: 2011;  Comments: with oopherectomy     SIMPLE MASTECTOMY  2002    bilateral, history of breast cancer       Physical Exam    Recent Vitals 3/10/2020   Height -   Weight -   BSA (m2) -   /80   Pulse 64    Temp -   Temp src -   SpO2 -   Some recent data might be hidden       Physical exam was not done.      Lab Results    No results found for this or any previous visit (from the past 168 hour(s)).    Imaging    No results found.      Signed by: Naveen Jc MD
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
## 2021-06-07 NOTE — PROGRESS NOTES
"Selam Rene is a 60 y.o. female who is being evaluated via a billable telephone visit regarding cancer of salivary gland.      The patient has been notified of following:     \"This telephone visit will be conducted via a call between you and your physician/provider. We have found that certain health care needs can be provided without the need for a physical exam.  This service lets us provide the care you need with a short phone conversation.  If a prescription is necessary we can send it directly to your pharmacy.  If lab work is needed we can place an order for that and you can then stop by our lab to have the test done at a later time.    If during the course of the call the physician/provider feels a telephone visit is not appropriate, you will not be charged for this service.\"     I have reviewed and updated the patient's Past Medical History, Social History, Family History and Medication List.    Phone call duration: 6 minutes    Steph Daugherty RN    "

## 2021-06-07 NOTE — TELEPHONE ENCOUNTER
Patient was set up for a telephone visit on 4/13/2020 for FMLA paperwork per PCP, paperwork is faxed to [phone], copy was placed in CA's desk and original was sent to scanning

## 2021-06-07 NOTE — TELEPHONE ENCOUNTER
RN cannot approve Refill Request    RN can NOT refill this medication med is not covered by policy/route to provider     . Last office visit: Visit date not found Last Physical: Visit date not found Last MTM visit: Visit date not found Last visit same specialty: 3/2/2020 Meg Stallworth PA-C.  Next visit within 3 mo: Visit date not found  Next physical within 3 mo: Visit date not found      Althea Ascencio, [facility] Triage/Med Refill 3/27/2020    Requested Prescriptions   Pending Prescriptions Disp Refills     FLOVENT  mcg/actuation inhaler [Pharmacy Med Name: Flovent HFA Inhalation Aerosol 110 MCG/ACT] 1 Inhaler 11     Sig: Inhale 2 puffs by mouth daily       There is no refill protocol information for this order

## 2021-06-07 NOTE — TELEPHONE ENCOUNTER
Phoned patient letter was mailed couple days ago and will be re-mailed today 3/23/2020, asked patient to activate MyChart since letter is already in chart, (faster way to get it).Sent activation code to patient and will be activating MyChart.

## 2021-06-07 NOTE — TELEPHONE ENCOUNTER
Patient had called in to the clinic looking for genetic testing results.  I see in her appointment tab that the genetic counselor plans on calling her on 4/2 at 8 AM for a tele-visit to discuss results.  When I attempted to call patient back to let her know this, it told me that this phone number that she left and the voicemail is no longer valid.  I tried this multiple times and was not able to get through to her.  This message will be forwarded on to the genetic counselor.    Jennifer Silveira RN

## 2021-06-07 NOTE — TELEPHONE ENCOUNTER
Patient called stating she had some pain on her right gum and she pressed on it. It caused a lot of pain and numbness in her tongue and right side of her face. She has a history of right jaw secretory carcinoma and this was operated on by a oral surgeon. She had followed up with Dr. Castillo. Per Dr. Castillo, she should see her surgeon because this is related to her surgery and he is unsure what procedure was performed. Patient notified that she should call her surgeon. She agreed.    Hannah Giraldo RN  [facility]  [phone]

## 2021-06-07 NOTE — TELEPHONE ENCOUNTER
Discussed her current immune compromise due to recent surgery to remove a mass from her neck, history of Salivary gland carcinoma, breast cancer, asthma. She has also had a recent viral illness. She was sick for about 2 weeks with cough and just stopped coughing and wheezing 2 days ago. 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  Note given to be on FMLA for 1 month and will re-evaluate for continued cover- in-place status.

## 2021-06-08 NOTE — TELEPHONE ENCOUNTER
Forms Request  Name of form/paperwork: FMLA update the date to be off work until 7/1/2020.  Have you been seen for this request: N/A  Do we have the form: Yes- in clinic  When is form needed by: the next week  How would you like the form returned: Fax:  on form  Patient Notified form requests are processed in 3-5 business days: No    Okay to leave a detailed message? Yes

## 2021-06-08 NOTE — TELEPHONE ENCOUNTER
Who is calling:  kevin  Reason for Call:  Wanting to  a copy of her [place] paperwork on Thursday morning . Please advise  Date of last appointment with primary care:   Okay to leave a detailed message: Yes

## 2021-06-08 NOTE — TELEPHONE ENCOUNTER
Left message for pt to call back. PCP/CA is out of clinic this week. I have no recent copy of the paperwork; I would like to assume that the paperwork was sent to medical records to be scanned into chart. The only [facility] paperwork we have on file currently is from April 2020.

## 2021-06-08 NOTE — TELEPHONE ENCOUNTER
Patient Returning Call  Reason for call:  Patient returning call stating No one returned my call as instructed.  Information relayed to patient:  Patient states The paperwork was updated and sent to my employer but the unemployment office needs this paperwork ASAP.   Patient has additional questions:  Yes  If YES, what are your questions/concerns:  I need someone to return my call today so I can  the copy ASAP for unemployment.   Okay to leave a detailed message?: Yes

## 2021-06-08 NOTE — TELEPHONE ENCOUNTER
Pt calling to see if she could  her FMLA paperwork. She stated that it was extended until July.   Pt had appointment on 5.21.20 and the provider mentioned:   She also needs her FMLA extended until July1. She has had recent biopsy for throat mass and this was benign, but she is still concerned about immune compromise from her previous history of breath cancer..  No updated copy has been scanned into chart. Would covering provider be willing to update FMLA forms scanned in from 4.13.20 and extend to July 1st? If so pt would like to  a copy in clinic.

## 2021-06-08 NOTE — PROGRESS NOTES
"Selam Rene is a 60 y.o. female who is being evaluated via a billable telephone visit.      The patient has been notified of following:     \"This telephone visit will be conducted via a call between you and your physician/provider. We have found that certain health care needs can be provided without the need for a physical exam.  This service lets us provide the care you need with a short phone conversation.  If a prescription is necessary we can send it directly to your pharmacy.  If lab work is needed we can place an order for that and you can then stop by our lab to have the test done at a later time.    Telephone visits are billed at different rates depending on your insurance coverage. During this emergency period, for some insurers they may be billed the same as an in-person visit.  Please reach out to your insurance provider with any questions.    If during the course of the call the physician/provider feels a telephone visit is not appropriate, you will not be charged for this service.\"    Patient has given verbal consent to a Telephone visit? Yes    What phone number would you like to be contacted at? [phone]    Patient would like to receive their AVS by AVS Preference: Shama.      Assessment/Plan:  1. Dysuria  Urinalysis-UC if Indicated    nitrofurantoin, macrocrystal-monohydrate, (MACROBID) 100 MG capsule     See above note      Phone call duration:  11 minutes    Hayde Strong  "

## 2021-06-08 NOTE — PROGRESS NOTES
CHIEF COMPLAINT:  Chief Complaint   Patient presents with     UTI     Pain urinating, frequently, started Monday            HPI:  Selam Rene is a 60 y.o. female who is seen in telephone visit for new symptoms of UTI. she  has dysuria, urgency and frequency for the last 4 days. She has tried increasing her water intake. she has also tried 2 days of medication left from her last UTI, Macrobid. This helpped but was not enough. She does not have nausea, vomiting or fever.  She also needs her FMLA extended until July1. She has had recent biopsy for throat mass and this was benign, but she is still concerned about immune compromise from her previous history of breath cancer..  Review of Systems:  ROS: negative for fever, cough, malaise, fatigue, myalgias and as in HPI.    PREVIOUS MEDICAL HISTORY  Past Medical History:   Diagnosis Date     Acute Sinusitis     Created by Conversion      Anxiety      Asthma      Basal cell carcinoma     nasal area, reconstruction.     Breast cancer (H)      Cyclic vomiting syndrome      Depression      Migraine        PREVIOUS SURGICAL HISTORY  @[email or web address]@  CURRENT MEDICATIONS  Current Outpatient Medications on File Prior to Visit   Medication Sig Dispense Refill     albuterol (PROAIR HFA;PROVENTIL HFA;VENTOLIN HFA) 90 mcg/actuation inhaler Inhale 2 puffs every 4 (four) hours as needed for wheezing. 1 Inhaler 2     albuterol (PROVENTIL) 2.5 mg /3 mL (0.083 %) nebulizer solution Take 3 mL (2.5 mg total) by nebulization every 6 (six) hours as needed for wheezing. 75 mL 0     cyclobenzaprine (FLEXERIL) 5 MG tablet Take 1 tablet (5 mg total) by mouth 3 (three) times a day as needed for muscle spasms. 30 tablet 0     diazePAM (VALIUM) 5 MG tablet 1 tab 30 min before MRI. 2 tablet 0     diphenhydramine HCl (ZZZQUIL ORAL) Take 25 mg by mouth at bedtime as needed.       FLOVENT  mcg/actuation inhaler Inhale 2 puffs by mouth daily 1 Inhaler 11     ibuprofen (ADVIL,MOTRIN) 200 MG tablet Take  400 mg by mouth every 6 (six) hours as needed for pain.       lansoprazole (PREVACID) 30 MG capsule Take 1 capsule by mouth daily. 90 capsule 3     ondansetron (ZOFRAN) 4 MG tablet Take 1 tablet (4 mg total) by mouth daily as needed for nausea. 30 tablet 1     ondansetron (ZOFRAN-ODT) 4 MG disintegrating tablet Take 4-8 mg by mouth.       rizatriptan (MAXALT) 10 MG tablet Take 1 tablet (10 mg total) by mouth every 2 (two) hours as needed for migraine. May repeat in 2 hours if needed 10 tablet 11     valACYclovir (VALTREX) 500 MG tablet Take 1 tablet (500 mg total) by mouth daily. 30 tablet 0     No current facility-administered medications on file prior to visit.          ALLERGIES  Allergies   Allergen Reactions     Sulfa (Sulfonamide Antibiotics)      hives         PROBLEM LIST  Past Medical History:   Diagnosis Date     Acute Sinusitis     Created by Conversion      Anxiety      Asthma      Basal cell carcinoma     nasal area, reconstruction.     Breast cancer (H)      Cyclic vomiting syndrome      Depression      Migraine        SOCIAL HISTORY  Social History     Socioeconomic History     Marital status:      Spouse name: Not on file     Number of children: Not on file     Years of education: Not on file     Highest education level: Not on file   Occupational History     Not on file   Social Needs     Financial resource strain: Not on file     Food insecurity     Worry: Not on file     Inability: Not on file     Transportation needs     Medical: Not on file     Non-medical: Not on file   Tobacco Use     Smoking status: Never Smoker     Smokeless tobacco: Never Used   Substance and Sexual Activity     Alcohol use: Yes     Frequency: 2-4 times a month     Drinks per session: 1 or 2     Comment: 5-6/week     Drug use: No     Sexual activity: Never     Partners: Male   Lifestyle     Physical activity     Days per week: Not on file     Minutes per session: Not on file     Stress: Not on file   Relationships      Social connections     Talks on phone: Not on file     Gets together: Not on file     Attends Rastafarian service: Not on file     Active member of club or organization: Not on file     Attends meetings of clubs or organizations: Not on file     Relationship status: Not on file     Intimate partner violence     Fear of current or ex partner: Not on file     Emotionally abused: Not on file     Physically abused: Not on file     Forced sexual activity: Not on file   Other Topics Concern     Not on file   Social History Narrative     Not on file     OBJECTIVE:  There were no vitals taken for this visit.    Objective:  General: alert, oriented and in no distress  Respiratory: no difficulty speaking in full sentences, no apparent shortness of breath or audible wheezing.  Mood: no pressured speech, normal voice inflections, no agitation, no suicidal or homicidal ideations.    ASSESSMENT/PLAN:  1. Dysuria  Urinalysis-UC if Indicated    nitrofurantoin, macrocrystal-monohydrate, (MACROBID) 100 MG capsule       Push fluids and watch for any worsening signs of nausea, vomiting or fever. ER if these develop. Cranberry juice and Azo can be helpful.     Follow up with Meg Stallworth PA-C  for recheck if not improving.        Meg Stallworth 12/11/2018 6:03 AM

## 2021-06-08 NOTE — TELEPHONE ENCOUNTER
UTI symptoms,  again    Last UTI a few months ago, only took 3/4 of total RX , had leftover meds .    Recently had UTI symptoms again.  She Took left over medication . Two days ago.. two doses each day. 500mg x 2 Macrobid.    Symptoms.have returned.    Burning , frequency. Urgency.  No blood in urine  No back Pain No fever.      Telephone appointment made for patient at 1130 with NATY Good  [phone]      Jolie Marcelo RN  [facility] Triage/refill nurse

## 2021-06-08 NOTE — PROGRESS NOTES
"Selam Rene is a 60 y.o. female who is being evaluated via a billable telephone visit.    Chief Complaint   Patient presents with     Follow-up     paperwork to go back to work      Selam Rene is seen to check on her FMLA status. She was unsure when her next check was. Review of other surveilence shows that she had a colonoscopy 1 1/2 years ago and was told that her next recheck would be in [id number]. She has had her yearly skin cancer screening and only had actinic keratosis. She have been well, healing well from her throat biopsy and this was a non-cancerous lesion. She had time off until July 7 2020. She is still worried about Covid cases in the hospital staff and in the community.  ROS: negative for fever, cough, malaise, fatigue, myalgias and as in HPI.      The patient has been notified of following:     \"This telephone visit will be conducted via a call between you and your physician/provider. We have found that certain health care needs can be provided without the need for a physical exam.  This service lets us provide the care you need with a short phone conversation.  If a prescription is necessary we can send it directly to your pharmacy.  If lab work is needed we can place an order for that and you can then stop by our lab to have the test done at a later time.    Telephone visits are billed at different rates depending on your insurance coverage. During this emergency period, for some insurers they may be billed the same as an in-person visit.  Please reach out to your insurance provider with any questions.    If during the course of the call the physician/provider feels a telephone visit is not appropriate, you will not be charged for this service.\"    Patient has given verbal consent to a Telephone visit? Yes    What phone number would you like to be contacted at? [phone]    Patient would like to receive their AVS by AVS Preference: Shama.    Additional provider notes: " Objective:  General: alert, oriented and in no distress  Respiratory: no difficulty speaking in full sentences, no apparent shortness of breath or audible wheezing.  Mood: no pressured speech, normal voice inflections, no agitation, no suicidal or homicidal ideations.    Assessment/Plan:  .  1. Mass of salivary gland     2. History of skin cancer     3. Personal history of malignant neoplasm of breast     4. Malignant neoplasm of sigmoid colon (H)       Reviewed her current surveillance recommendations for colon cancer, skin cancer and her current salivary mass follow up. She is doing well, no illness and voice improved. She will return as planned to work on July 7. Follow up to release for work in early July.       Phone call duration:  6 minutes    Steven Avila

## 2021-06-08 NOTE — TELEPHONE ENCOUNTER
Patient wants FMLA extended to 7/1/2020, if appropriate I can print and fax it to you, so you can make the change and fax it back.  Please advise

## 2021-06-09 NOTE — TELEPHONE ENCOUNTER
Do you know anything for these forms?? Everything from her mailbox was sent through [facility] but have not seen this [facility] paperwork, Know anything?

## 2021-06-09 NOTE — TELEPHONE ENCOUNTER
Who is calling:  Patient.  Reason for Call:  Requesting her paperwork to be extended to go back to work on 8/1/2020.  Would like to  paperwork today at the clinic.  Please call when updated and at .  Date of last appointment with primary care: 6/18/2020  Okay to leave a detailed message: Yes

## 2021-06-09 NOTE — PROGRESS NOTES
[facility] Hematology and Oncology Progress Note    Patient: Selam Rene  MRN: [id number]  Date of Service: 07/07/2020        Reason for Visit    Chief Complaint   Patient presents with     HE Cancer     Cancer of minor salivary gland       Assessment and Plan  Cancer Staging  No matching staging information was found for the patient.    1. Salivary gland adenocarcinoma, T1N0M0, Dx September 2019: pt is currently on observation and doing well. Clinically no signs of recurrence. We will have her return in 3 months with repeat scans with MRI, CT    2. Hx of breast cancer at young age (42): s/p bilateral mastectomy, chemo and radiation. Presumed triple negative. Continue to monitor.     3. 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 Has met with genetic counselor. Continue to follow their recommendations for further screening. Is seeing dermatologist every 6-12 months. Getting colonoscopy every 5 years.       ECOG Performance   ECOG Performance Status: 0     Distress Assessment  Distress Assessment Score: 2(Covid related)    Pain  Currently in Pain: No/denies      Problem List    1. Malignant neoplasm of female breast, unspecified estrogen receptor status, unspecified laterality, unspecified site of breast (H)     2. 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 Salivary gland carcinoma (H)          ______________________________________________________________________________    History of Present Illness    Measurable Disease: none post operatively    Treatment: biopsy September 2019  resection and right radical neck dissection on October 21, 2019  Observation since    Interim History: pt is here today for follow up visit. She is doing well. No mouth pain. No swelling. No neck changes. Eating well. No weight loss.       Review of Systems    Oncology Nurse Assessment/CMA Intake: Constitutional  Constitutional (WDL): All constitutional elements are within defined  "limits  Neurosensory  Neurosensory (WDL): All neurosensory elements are within defined limits  Eye   Eye Disorder (WDL): All eye disorder elements are within defined limits  Ear  Ear Disorder (WDL): All ear disorder elements are within defined limits  Cardiovascular  Cardiovascular (WDL): All cardiovascular elements are within defined limits  Pulmonary  Respiratory (WDL): Within Defined Limits  Gastrointestinal  Gastrointestinal (WDL): All gastrointestinal elements are within defined limits  Genitourinary  Genitourinary (WDL): All genitourinary elements are within defined limits  Lymphatic  Lymph (WDL): All lymph disorder elements are within defined limits  Musculoskeletal and Connective Tissue  Musculoskeletal and Connetive Tissue Disorders (WDL): All Musculoskeletal and Connetive Tissue Disorder elements are within defined limits  Integumentary  Integumentary (WDL): All integumentary elements are within defined limits  Patient Coping  Patient Coping: Accepting;Open/discussion  Accompanied by  Accompanied by: Alone  Oral Chemo Adherence         Past History  Past Medical History:   Diagnosis Date     Acute Sinusitis     Created by Conversion      Anxiety      Asthma      Basal cell carcinoma     nasal area, reconstruction.     Breast cancer (H)      Cyclic vomiting syndrome      Depression      Migraine        PHYSICAL EXAM  /82   Pulse 93   Ht 5' 3.5\" (1.613 m)   Wt 139 lb 11.2 oz (63.4 kg)   SpO2 97%   BMI 24.36 kg/m      GENERAL: no acute distress. Cooperative in conversation. Here alone due to visitor restrictions. Mask on  HEENT: some post surgical changes.   RESP: Regular respiratory rate. No expiratory wheezes   MUSCULOSKELETAL: no bilateral leg swelling  NEURO: non focal. Alert and oriented x3.   PSYCH: within normal limits. No depression or anxiety.  SKIN: exposed skin is dry intact.     Lab Results    No results found for this or any previous visit (from the past 168 hour(s)).    Imaging    No " results found.      Signed by: Sridevi Mancia, CNP

## 2021-06-09 NOTE — PROGRESS NOTES
"Selam Rene is a 60 y.o. female who is being evaluated via a billable telephone visit.    Chief Complaint   Patient presents with     Follow-up     she wants work restrictions extended through August 1st from her surgery,needs a letter for her work.       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 She has continued to remain out of the public and only goes to the grocery store. She wears a mask whenever she is out of her house. She has a diabetic child who she takes care of in the home as well. She works mainly in [employer] and this is a safe position, but she is currently required by her job to be anywhere in the hospital including ER where she could be exposed to COVID. She would like to extend her FMLA until September 1, 2020. She notes that she has increased situational anxiety due to this pandemic and this with her health and her daughter's health concerns are her needs to remain in her home.   ROS: negative for fever, cough, malaise, fatigue, myalgias and as in HPI.    The patient has been notified of following:     \"This telephone visit will be conducted via a call between you and your physician/provider. We have found that certain health care needs can be provided without the need for a physical exam.  This service lets us provide the care you need with a short phone conversation.  If a prescription is necessary we can send it directly to your pharmacy.  If lab work is needed we can place an order for that and you can then stop by our lab to have the test done at a later time.    Telephone visits are billed at different rates depending on your insurance coverage. During this emergency period, for some insurers they may be billed the same as an in-person visit.  Please reach out to your insurance provider with any questions.    If during the course of the call the physician/provider feels a telephone visit is not appropriate, you will " "not be charged for this service.\"    Patient has given verbal consent to a Telephone visit? Yes    What phone number would you like to be contacted at? [phone]    Patient would like to receive their AVS by AVS Preference: Mail a copy.    Additional provider notes: Objective:  General: alert, oriented and in no distress  Respiratory: no difficulty speaking in full sentences, no apparent shortness of breath or audible wheezing.  Mood: no pressured speech, normal voice inflections, no agitation, no suicidal or homicidal ideations.      Assessment/Plan:    1. Salivary gland carcinoma (H)     2. Situational anxiety     3. Malignant neoplasm of sigmoid colon (H)     4. Personal history of malignant neoplasm of breast       Given the patient's multiple cancer history and recent salivary gland tumor surgery as well as her situational anxiety and her care of a child with Diabetes in her home, she will remain off work until September 1, 2020 and follow up at this time to consider any return to work.   Paperwork from  to be faxed to [phone].    Phone call duration:  9 minutes    Sia Garcia CMA  "

## 2021-06-09 NOTE — TELEPHONE ENCOUNTER
Who is calling:  Patient   Reason for Call:  Patient is requesting a printed copy of her FMLA paper work she is going to stop by clinic today requested to keep ready at  please call patient when it is ready for  .  Date of last appointment with primary care: 06/18/20  Okay to leave a detailed message: No

## 2021-06-09 NOTE — TELEPHONE ENCOUNTER
Forms Request  Name of form/paperwork:printed copy of [facility] papers .  She needs a hardcopy also she states. Please call when up in front.   Have you been seen for this request: Yes:  .  Do we have the form: Yes- in clinic  When is form needed by: ASAP  How would you like the form returned:   Patient Notified form requests are processed in 3-5 business days: No    Okay to leave a detailed message? Yes

## 2021-06-09 NOTE — TELEPHONE ENCOUNTER
Called to pt and pt was informed that PCP is out of office at this time.  Pt would like a call on Monday when PCP returns to address staying out of work until 08/01/2020      PCP last note states that pt is off until 07/07/2020 but the [facility] paperwork states that pt is off 06/01-07/01/2020    Please advise if you would like Video visit or if you would like to call pt to discuss?

## 2021-06-10 NOTE — PROGRESS NOTES
"Selam Rene is a 60 y.o. female who is being evaluated via a billable telephone visit.  Rhinorrhea, congestion, headaches for about 2 weeks, worse on right.  No fever ear pain or cough.  Taking Advil but not helping, Sudafed not helping.    The patient has been notified of following:     \"This telephone visit will be conducted via a call between you and your physician/provider. We have found that certain health care needs can be provided without the need for a physical exam.  This service lets us provide the care you need with a short phone conversation.  If a prescription is necessary we can send it directly to your pharmacy.  If lab work is needed we can place an order for that and you can then stop by our lab to have the test done at a later time.    Telephone visits are billed at different rates depending on your insurance coverage. During this emergency period, for some insurers they may be billed the same as an in-person visit.  Please reach out to your insurance provider with any questions.    If during the course of the call the physician/provider feels a telephone visit is not appropriate, you will not be charged for this service.\"    Patient has given verbal consent to a Telephone visit? Yes    What phone number would you like to be contacted at? [phone]    Patient would like to receive their AVS by AVS Preference: Shama.    Additional provider notes: Objective:  General: alert, oriented and in no distress  Respiratory: no difficulty speaking in full sentences, no apparent shortness of breath or audible wheezing.  Mood: no pressured speech, normal voice inflections, no agitation, no suicidal or homicidal ideations.  Assessment/Plan:  1. Acute recurrent maxillary sinusitis  azithromycin (ZITHROMAX) 250 MG tablet     Use flonase daily and continue tylenol for pain or Advil.     She will return [employer] paperwork for return to work next week.      Phone call duration:  6 minutes    Leny Silvestre CMA    "

## 2021-06-11 NOTE — PROGRESS NOTES
Assessment/Plan:        1. Encounter to establish care      2. Acute recurrent maxillary sinusitis  Recent notes reviewed  Workability form signed  Follow-up PRN        Patient Care Team:  Meg Stallworth PA-C as PCP - General (Physician Assistant)  Meg Stallworth PA-C as Assigned PCP  Naveen Jc MD as Physician (Hematology and Oncology)  Jennifer Hong, RN as Oncology Nurse Navigator          Subjective:    Patient ID:   Selam Rene is a 60 y.o. female here for healthcare establishment.  She has a history of asthma and multiple neoplasms involving the salivary gland, basal cell of the face, and breast.  She has a workability form with her to be signed to able her to return to work as of 9/18/2020, been off work due to recent sinusitis.  She notes to be feeling well now and able to return to work without restrictions         Review of Systems  Allergy: reviewed  General : negative  A complete 10 point review of systems was obtained and is negative other than what is stated in the HPI.       The following patient's history were reviewed and updated as appropriate:   She  has a past medical history of Acute Sinusitis, Anxiety, Asthma, Basal cell carcinoma, Breast cancer (H), Cyclic vomiting syndrome, Depression, and Migraine..      Outpatient Encounter Medications as of 9/14/2020   Medication Sig Dispense Refill     albuterol (PROVENTIL) 2.5 mg /3 mL (0.083 %) nebulizer solution Take 3 mL (2.5 mg total) by nebulization every 6 (six) hours as needed for wheezing. 75 mL 0     azithromycin (ZITHROMAX) 250 MG tablet Take 500mg (2 tablets) day one, and then 250mg (1 tablet) days 2-5 6 tablet 0     cyclobenzaprine (FLEXERIL) 5 MG tablet Take 1 tablet (5 mg total) by mouth 3 (three) times a day as needed for muscle spasms. 30 tablet 0     FLOVENT  mcg/actuation inhaler Inhale 2 puffs by mouth daily 1 Inhaler 11     ibuprofen (ADVIL,MOTRIN) 200 MG tablet Take 400 mg by mouth  every 6 (six) hours as needed for pain.       lansoprazole (PREVACID) 30 MG capsule Take 1 capsule by mouth daily. 90 capsule 3     ondansetron (ZOFRAN) 4 MG tablet Take 1 tablet (4 mg total) by mouth daily as needed for nausea. 30 tablet 1     PROAIR HFA 90 mcg/actuation inhaler INHALE 2 PUFFS BY MOUTH EVERY 4 HOURS AS NEEDED FOR WHEEZING 8.5 g 0     rizatriptan (MAXALT) 10 MG tablet Take 1 tablet (10 mg total) by mouth every 2 (two) hours as needed for migraine. May repeat in 2 hours if needed 10 tablet 11     valACYclovir (VALTREX) 500 MG tablet Take 1 tablet (500 mg total) by mouth daily. 30 tablet 0     No facility-administered encounter medications on file as of 9/14/2020.          Objective:   Pulse 87   Wt 141 lb (64 kg)   SpO2 97%   BMI 24.59 kg/m        Physical Exam    General Appearance:    Alert, cooperative, no distress   Head:    Normocephalic, Sinus: Nontender to palpation   Eyes:    PERRL, conjunctiva/corneas clear, EOM's intact, both eyes   Ears:    bilateral TM's and external ear canals normal   Throat:   mucous membranes moist, pharynx normal without lesions   Neck:   Supple, symmetrical, trachea midline, no adenopathy;     thyroid:  no enlargement/tenderness/nodules;    Lungs:     clear to auscultation, no wheezes, rales or rhonchi, symmetric air entry    Chest Wall:    No tenderness or deformity    Heart:    Regular rate and rhythm, S1 and S2 normal, no murmur, rub   or gallop   Skin:   Skin color, texture, turgor normal, no rashes or lesions

## 2021-06-11 NOTE — TELEPHONE ENCOUNTER
Called to pt - per PCP pt will need an appointment for the forms.  Will need to Est Care with new provider.      Pt needs forms for Monday 9/14     Scheduled appt

## 2021-06-11 NOTE — TELEPHONE ENCOUNTER
FYI - Status Update  Who is Calling: [facility]  Update: Questioning if refill can be expedited due to patient being out of medication. Please advise.  Okay to leave a detailed message?:  No return call needed

## 2021-06-11 NOTE — TELEPHONE ENCOUNTER
Refill Approved    Rx renewed per Medication Renewal Policy. Medication was last renewed on 9/5/19.    Althea Ascencio, [facility] Connection Triage/Med Refill 9/11/2020     Requested Prescriptions   Pending Prescriptions Disp Refills     PROAIR HFA 90 mcg/actuation inhaler [Pharmacy Med Name: ProAir HFA Inhalation Aerosol Solution 108 (90 Base) MCG/ACT] 8.5 g 0     Sig: INHALE 2 PUFFS BY MOUTH EVERY 4 HOURS AS NEEDED FOR WHEEZING       Albuterol/Levalbuterol Refill Protocol Passed - 9/11/2020 12:06 PM        Passed - PCP or prescribing provider visit in last year     Last office visit with prescriber/PCP: Visit date not found OR same dept: 3/2/2020 Meg Stallworth PA-C OR same specialty: 3/2/2020 Meg Stallworth PA-C Last physical: Visit date not found       Next appt within 3 mo: Visit date not found  Next physical within 3 mo: Visit date not found  Prescriber OR PCP: Astrid Landin MD  Last diagnosis associated with med order: 1. Uncomplicated asthma, unspecified asthma severity, unspecified whether persistent  - PROAIR HFA 90 mcg/actuation inhaler [Pharmacy Med Name: ProAir HFA Inhalation Aerosol Solution 108 (90 Base) MCG/ACT]; INHALE 2 PUFFS BY MOUTH EVERY 4 HOURS AS NEEDED FOR WHEEZING  Dispense: 8.5 g; Refill: 0    If protocol passes may refill for 6 months if within 3 months of last provider visit (or a total of 9 months). If patient requesting >1 inhaler per month refill x 6 months and have patient make appointment with provider.

## 2021-06-12 NOTE — TELEPHONE ENCOUNTER
Who is calling:  [facility]  Reason for Call:  [facility] asking to expedite the refill for the albuterol inhaler because the patient had complained of shortness of breath. The patient was tested for Covid today -pending results.  Date of last appointment with primary care:   Okay to leave a detailed message: Yes

## 2021-06-12 NOTE — TELEPHONE ENCOUNTER
Refill Approved    Rx renewed per Medication Renewal Policy. Medication was last renewed on 9/11/20.    Althea Ascencio, [facility] Connection Triage/Med Refill 10/6/2020     Requested Prescriptions   Pending Prescriptions Disp Refills     albuterol (PROAIR HFA;PROVENTIL HFA;VENTOLIN HFA) 90 mcg/actuation inhaler [Pharmacy Med Name: Albuterol Sulfate HFA Inhalation Aerosol Solution 108 (90 Base) MCG/ACT] 8.5 g 0     Sig: INHALE 2 PUFFS BY MOUTH EVERY 4 HOURS AS NEEDED FOR WHEEZING       Albuterol/Levalbuterol Refill Protocol Passed - 10/6/2020  2:28 PM        Passed - PCP or prescribing provider visit in last year     Last office visit with prescriber/PCP: Visit date not found OR same dept: 9/14/2020 Morteza Nichole MD OR same specialty: 9/14/2020 Morteza Nichole MD Last physical: Visit date not found       Next appt within 3 mo: Visit date not found  Next physical within 3 mo: Visit date not found  Prescriber OR PCP: Astrid Landin MD  Last diagnosis associated with med order: 1. Uncomplicated asthma, unspecified asthma severity, unspecified whether persistent  - albuterol (PROAIR HFA;PROVENTIL HFA;VENTOLIN HFA) 90 mcg/actuation inhaler [Pharmacy Med Name: Albuterol Sulfate HFA Inhalation Aerosol Solution 108 (90 Base) MCG/ACT]; INHALE 2 PUFFS BY MOUTH EVERY 4 HOURS AS NEEDED FOR WHEEZING  Dispense: 8.5 g; Refill: 0    If protocol passes may refill for 6 months if within 3 months of last provider visit (or a total of 9 months). If patient requesting >1 inhaler per month refill x 6 months and have patient make appointment with provider.

## 2021-06-12 NOTE — PROGRESS NOTES
[facility] Hematology and Oncology Progress Note    Patient: Selam Rene  MRN: [id number]  Date of Service: 11/02/2020        Reason for Visit    Chief Complaint   Patient presents with     HE Cancer     Cancer of minor salivary gland       Assessment and Plan  Cancer Staging  No matching staging information was found for the patient.    1. Salivary gland adenocarcinoma, T1N0M0, Dx September 2019: pt is currently on observation and doing well. Clinically no signs of recurrence. We will have her return in 6 months with repeat scans with MRI, CT    2. Hx of breast cancer at young age (42): s/p bilateral mastectomy, chemo and radiation. Presumed triple negative. Continue to monitor.     3. 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 Has met with genetic counselor. Continue to follow their recommendations for further screening. Is seeing dermatologist every 6-12 months. Getting colonoscopy every 5 years.       ECOG Performance   ECOG Performance Status: 0     Distress Assessment  Distress Assessment Score: No distress    Pain  Currently in Pain: Yes  Pain Score (Initial OR Reassessment): 8  Location: right neck: she is going to return to see her surgeon. Pain seems to be different than after surgery. If no improvement, I would try PT.       Problem List    1. 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 She is doing well. No mouth pain. No swelling. No neck changes but is having intermittent, sharp neck pain. It is very severe, but comes and goes. Not constant. Sometimes  "positional. Eating well. No weight loss.       Review of Systems    Oncology Nurse Assessment/CMA Intake: Constitutional  Constitutional (WDL): All constitutional elements are within defined limits  Neurosensory  Neurosensory (WDL): All neurosensory elements are within defined limits  Eye   Eye Disorder (WDL): All eye disorder elements are within defined limits  Ear  Ear Disorder (WDL): All ear disorder elements are within defined limits  Cardiovascular  Cardiovascular (WDL): All cardiovascular elements are within defined limits  Pulmonary  Respiratory (WDL): Exceptions to WDL(allergies)  Gastrointestinal  Gastrointestinal (WDL): All gastrointestinal elements are within defined limits  Genitourinary  Genitourinary (WDL): All genitourinary elements are within defined limits  Lymphatic  Lymph (WDL): All lymph disorder elements are within defined limits  Musculoskeletal and Connective Tissue  Musculoskeletal and Connetive Tissue Disorders (WDL): All Musculoskeletal and Connetive Tissue Disorder elements are within defined limits  Integumentary  Integumentary (WDL): All integumentary elements are within defined limits  Patient Coping  Patient Coping: Accepting;Open/discussion  Accompanied by  Accompanied by: Alone  Oral Chemo Adherence         Past History  Past Medical History:   Diagnosis Date     Acute Sinusitis     Created by Conversion      Anxiety      Asthma      Basal cell carcinoma     nasal area, reconstruction.     Breast cancer (H)      Cyclic vomiting syndrome      Depression      Migraine        PHYSICAL EXAM  /71   Pulse 91   Temp 98.1  F (36.7  C) (Oral)   Ht 5' 3.5\" (1.613 m)   Wt 138 lb 14.4 oz (63 kg)   SpO2 96%   BMI 24.22 kg/m      GENERAL: no acute distress. Cooperative in conversation. Here alone due to visitor restrictions. Mask on  HEENT: some post surgical changes. Well healed  RESP: Regular respiratory rate. No expiratory wheezes   MUSCULOSKELETAL: no bilateral leg swelling  NEURO: " non focal. Alert and oriented x3.   PSYCH: within normal limits. No depression or anxiety.  SKIN: exposed skin is dry intact.     Lab Results    Recent Results (from the past 168 hour(s))   POCT CREATININE   Result Value Ref Range    iSTAT Creatinine 0.8 0.6 - 1.1 mg/dL    iSTAT GFR MDRD Af Amer >60 >60 mL/min/1.73m2    iSTAT GFR MDRD Non Af Amer >60 >60 mL/min/1.73m2   POCT CREATININE   Result Value Ref Range    iSTAT Creatinine 0.7 0.6 - 1.1 mg/dL    iSTAT GFR MDRD Af Amer >60 >60 mL/min/1.73m2    iSTAT GFR MDRD Non Af Amer >60 >60 mL/min/1.73m2       Imaging    Ct Chest With Contrast    Result Date: 10/28/2020  EXAM: CT CHEST W CONTRAST LOCATION: [facility] DATE/TIME: 10/28/2020 8:39 PM INDICATION: fu salivary gland tumor,  s/p resection in 9/19 COMPARISON: 04/01/2020. TECHNIQUE: CT chest with IV contrast. Multiplanar reformats were obtained. Dose reduction techniques were used. CONTRAST: Iohexol (Omni) 75mL FINDINGS: LUNGS AND PLEURA: No significant change or suspicious nodules. MEDIASTINUM/AXILLAE: Stable 9 x 10 mm high left posterior paratracheal node (series 3, image 26). No new or enlarging adenopathy. UPPER ABDOMEN: No significant finding. MUSCULOSKELETAL: Bilateral breast surgical changes and prosthetics. Anterior right rib deformities and sclerosis without significant change.     1.  No significant change to prior.     Mr Soft Tissue Neck With Without Contrast    Result Date: 10/29/2020  EXAM: MR NECK SOFT TISSUE ONLY W WO CONTRAST LOCATION: [facility] DATE/TIME: 10/28/2020 8:32 PM INDICATION: fu minor salivary gland tumor resected in 9/19 COMPARISON: MRI 4/1/2020. CONTRAST: Gadavist 7ml TECHNIQUE: Multiplanar multisequence neck MRI performed without and with IV contrast. FINDINGS: VISUALIZED INTRACRANIAL/ORBITS/SINUSES: No abnormality of the visualized intracranial compartment or orbits. Postoperative changes nasal cavity and paranasal sinuses with  scattered mucosal thickening in the paranasal sinuses. No air-fluid level. Mastoids  clear. SUPRAHYOID NECK: Normal nasopharynx and oropharynx. Normal parapharyngeal and  spaces. Normal oral cavity and floor of mouth structures. No evidence for recurrent mass in the right glossal tonsillar sulcus at the site of original lesion. INFRAHYOID NECK: Normal supraglottic larynx, vocal cords, and hypopharynx. SALIVARY GLANDS: Stable surgical removal of the right submandibular gland. Normal left submandibular gland. Both parotid glands unremarkable. LYMPH NODES: No new pathologic lymph nodes by size or morphology criteria. Stable small cluster of right level 2 nodes near the inferior margin of the right parotid gland from prior MRI with the largest oval nonnecrotic node measuring 8 mm in transverse diameter unchanged from prior. Stable changes from right upper neck dissection. VESSELS: Expected vascular flow voids are identified. THYROID: Normal. BONES: Normal marrow signal.     1.  Stable appearance of the MRI neck soft tissues from 4/1/2020. No localized tumor recurrence in the right glossotonsillar sulcus. 2.  No new pathologic lymphadenopathy in the neck. Stable small cluster of right level 2 nonnecrotic lymph nodes measuring up to 8 mm compared to MRI 4/1/2020. 3.  Stable changes from right submandibular gland removal and right upper neck dissection.        Signed by: Sridevi Mancia CNP

## 2021-06-12 NOTE — TELEPHONE ENCOUNTER
Pt is calling.    COVID online test told her that she needs testing for COVID-19 per the CDC web site.  Starting yesterday, she was complaining of being cold. Today she woke up with nasal congestion and a dry cough. No fever. No body aches, increase fatigue, or HA.  No chest pain, shortness of breath, or chest tightness. Chest tightness occurs with a cough only. Wheezing off and on due to allergies and asthma for the past 2 weeks, per patient. She stated that she is using her Albuterol inhaler and this does help.   Care advice reviewed with her. I advised her to go online for a virtual visit through [employer]. Call back sooner with any new or worsening signs, symptoms, concerns, or questions.    COVID 19 Nurse Triage Plan/Patient Instructions    Please be aware that novel coronavirus (COVID-19) may be circulating in the community. If you develop symptoms such as fever, cough, or SOB or if you have concerns about the presence of another infection including coronavirus (COVID-19), please contact your health care provider or visit [email or web address].     Disposition/Instructions    Virtual Visit with provider recommended. Reference Visit Selection Guide.    Thank you for taking steps to prevent the spread of this virus.  o Limit your contact with others.  o Wear a simple mask to cover your cough.  o Wash your hands well and often.    Resources    [facility]: About COVID-19: [email or web address]    CDC: What to Do If You're Sick: www.cdc.gov/coronavirus/2019-ncov/about/steps-when-sick.html    CDC: Ending Home Isolation: www.cdc.gov/coronavirus/2019-ncov/hcp/disposition-in-home-patients.html     CDC: Caring for Someone: www.cdc.gov/coronavirus/2019-ncov/if-you-are-sick/care-for-someone.html     [facility]: Interim Guidance for Hospital Discharge to Home: www.health.[facility].[place]/diseases/coronavirus/hcp/hospdischarge.pdf    [facility] clinical trials (COVID-19 research studies):  clinicalaffairs.[facility]-clinical-trials     Below are the COVID-19 hotlines at the [place] [facility] ([facility]). Interpreters are available.   o For health questions: Call 850-708-3850 or 1-465.295.9947 (7 a.m. to 7 p.m.)  o For questions about schools and childcare: Call 091-902-3153 or 1-655.691.1295 (7 a.m. to 7 p.m.)     Reason for Disposition    [1] COVID-19 infection suspected by caller or triager AND [2] mild symptoms (cough, fever, or others) AND [3] no complications or SOB    Additional Information    Negative: SEVERE difficulty breathing (e.g., struggling for each breath, speaks in single words)    Negative: Difficult to awaken or acting confused (e.g., disoriented, slurred speech)    Negative: Bluish (or gray) lips or face now    Negative: Shock suspected (e.g., cold/pale/clammy skin, too weak to stand, low BP, rapid pulse)    Negative: Sounds like a life-threatening emergency to the triager    Negative: [1] COVID-19 exposure AND [2] no symptoms    Negative: COVID-19 and Breastfeeding, questions about    Negative: [1] Adult with possible COVID-19 symptoms AND [2] triager concerned about severity of symptoms or other causes    Negative: SEVERE or constant chest pain or pressure (Exception: mild central chest pain, present only when coughing)    Negative: MODERATE difficulty breathing (e.g., speaks in phrases, SOB even at rest, pulse 100-120)    Negative: Patient sounds very sick or weak to the triager    Negative: MILD difficulty breathing (e.g., minimal/no SOB at rest, SOB with walking, pulse <100)    Negative: Chest pain or pressure    Negative: Fever > 103 F (39.4 C)    Negative: [1] Fever > 101 F (38.3 C) AND [2] age > 60    Negative: [1] Fever > 100.0 F (37.8 C) AND [2] bedridden (e.g., nursing home patient, CVA, chronic illness, recovering from surgery)    Negative: HIGH RISK patient (e.g., age > 64 years, diabetes, heart or lung disease, weak immune system) (Exception: Has already been  evaluated by healthcare provider and has no new or worsening symptoms)    Negative: Fever present > 3 days (72 hours)    Negative: [1] Fever returns after gone for over 24 hours AND [2] symptoms worse or not improved    Negative: [1] Continuous (nonstop) coughing interferes with work or school AND [2] no improvement using cough treatment per protocol    Protocols used: CORONAVIRUS (COVID-19) DIAGNOSED OR [id number] 8.4.20    Geeta Lau RN   [facility] Nurse Advisor  10/05/20 at 10:13 PM

## 2021-06-13 NOTE — TELEPHONE ENCOUNTER
Refill Approved    Rx renewed per Medication Renewal Policy. Medication was last renewed on 10/31/19.    Althea Ascencio, [facility] Triage/Med Refill 12/7/2020     Requested Prescriptions   Pending Prescriptions Disp Refills     lansoprazole (PREVACID) 30 MG capsule [Pharmacy Med Name: Lansoprazole Oral Capsule Delayed Release 30 MG] 90 capsule 0     Sig: Take 1 capsule by mouth daily.       GI Medications Refill Protocol Passed - 12/6/2020  3:19 PM        Passed - PCP or prescribing provider visit in last 12 or next 3 months.     Last office visit with prescriber/PCP: 3/2/2020 Meg Stallworth PA-C OR same dept: 9/14/2020 Morteza Nichole MD OR same specialty: 9/14/2020 Morteza Nichole MD  Last physical: 9/16/2019 Last MTM visit: Visit date not found   Next visit within 3 mo: Visit date not found  Next physical within 3 mo: Visit date not found  Prescriber OR PCP: Meg Stallworth PA-C  Last diagnosis associated with med order: 1. Gastroesophageal reflux disease without esophagitis  - lansoprazole (PREVACID) 30 MG capsule [Pharmacy Med Name: Lansoprazole Oral Capsule Delayed Release 30 MG]; Take 1 capsule by mouth daily.  Dispense: 90 capsule; Refill: 0    If protocol passes may refill for 12 months if within 3 months of last provider visit (or a total of 15 months).

## 2021-06-14 NOTE — PATIENT INSTRUCTIONS - HE
We will call you if the urine culture shows that the bacteria causing the infection is resistant to the antibiotic we prescribed.    Macrobid 100 mg for 5 days     Macrobid half dose to prevent UTI after sexual intercourse - refills from this from your provider.  Also be sure to urinate immediately after.    Push water.      Recheck if not better in 2-3 days.

## 2021-06-14 NOTE — TELEPHONE ENCOUNTER
Patient informed. I also confirmed that even thought she has received COVID vaccines, she will still need to test for COVID prior to surgery. Her [facility] paperwork has been faxed over. I informed her this can not be completed until day of surgery. She confirmed with HR that it fine as long as it is completed and faxed back ASAP after surgery.

## 2021-06-14 NOTE — TELEPHONE ENCOUNTER
Pt taking Macrobid for UTI <72 hours, not seeing as much improvement as she would like, wants to make sure antibiotic is going to be effective.     Advised pt culture showed infection is sensitive to Macrobid so continue taking. Increase fluids. Call back if symptoms persist or worsen.    Pt verbalizes understanding and agrees to plan.     Reason for Disposition    [1] Taking antibiotic < 72 hours (3 days) for UTI AND [2] painful urination or frequency not improved    Additional Information    Negative: Shock suspected (e.g., cold/pale/clammy skin, too weak to stand, low BP, rapid pulse)    Negative: Sounds like a life-threatening emergency to the triager    Negative: Urinary tract infection suspected, but not taking antibiotics    Negative: [1] Unable to urinate (or only a few drops) > 4 hours AND     [2] bladder feels very full (e.g., palpable bladder or strong urge to urinate)    Negative: Passing pure blood or large blood clots (i.e., size > a dime)  (Exceptions: flecks, small strands, or pinkish-red color)    Negative: Patient sounds very sick or weak to the triager    Negative: [1] SEVERE pain (e.g., excruciating) AND [2] no improvement 2 hours after pain medications    Negative: [1] Fever > 100.0 F (37.8 C) AND [2] new onset since starting antibiotics    Negative: [1] Side (flank) or lower back pain AND [2] new onset since starting antibiotics    Negative: [1] Taking antibiotic > 24 hours for UTI AND [2] flank or lower back pain worsening    Negative: [1] Vomiting 2 or more times AND [2] interferes with taking oral antibiotic    Negative: [1] Taking antibiotic > 24 hours for UTI (urinary tract or bladder infection) AND [2] fever persists    Negative: [1] Taking antibiotic > 72 hours (3 days) for UTI AND [2] painful urination or frequency not improved    Negative: [1] Taking antibiotic > 72 hours (3 days) for UTI AND [2] flank or lower back pain not improved    Negative: Diabetes mellitus or weak immune  system (e.g., HIV positive, cancer chemo, splenectomy, organ transplant, chronic steroids)    Negative: Sickle cell disease    Protocols used: URINARY TRACT INFECTION ON ANTIBIOTIC FOLLOW-UP CALL - FEMALE-A-

## 2021-06-14 NOTE — PROGRESS NOTES
Chief Complaint   Patient presents with     Dysuria     x2 days       ASSESSMENT & PLAN:   Diagnoses and all orders for this visit:    Urinary tract infection in female  -     nitrofurantoin, macrocrystal-monohydrate, (MACROBID) 100 MG capsule; Take 1 capsule (100 mg total) by mouth 2 (two) times a day for 5 days.  Dispense: 10 capsule; Refill: 0  -     nitrofurantoin (MACRODANTIN) 50 MG capsule; Take 1 capsule (50 mg total) by mouth daily as needed (Immediately after intercourse to prevent UTI).  Dispense: 28 capsule; Refill: 0    Dysuria  -     Urinalysis-UC if Indicated  -     Culture, Urine    HX: breast cancer      History and UA consistent with UTI.  Push fluids.  Return in 3 days if no better, sooner if worsening.    Patient given preventive UTI medication per her request to prevent postcoital UTIs.  Is a frequent occurrence for her.    Supportive care discussed.  See discharge instructions below for specific recommendations given.    At the end of the encounter, I discussed results, diagnosis, medications with the patient and/or caregivers. Discussed red flags for immediate return to clinic/ER, as well as indications for follow up if no improvement. Patient and/or caregiver understood and agreed to plan. Patient was stable for discharge.          SUBJECTIVE    HPI:  HPI  Selam JOAQUÍN Rene presents to the walk-in clinic with   Chief Complaint   Patient presents with     Dysuria     x2 days     Uncomfortable bladder with dysuria for the last 2 days.  Feels like previous UTIs.  Has a history of UTIs associated with intercourse.  Even urinating following intercourse does not necessarily prevent UTIs.  Patient has been frustrated with this.  She has history of cancer and is not currently on chemotherapy.    Has done well in the past with preventive medications for postcoital UTIs.  Prefers Macrobid for UTI treatment.    Review of urine cultures shows no resistance to Macrobid over the last few positive  cultures.    No flank pain, fevers.    History of breast, skin, and salivary gland cancer.  Not currently on chemotherapy.  Has a concerning supraclavicular lymph node that is being scheduled for a biopsy with Dr. Carina myles.    See ROS for additional symptoms and/or pertinent negatives.       History obtained from the patient.      Review of Systems   Constitutional: Negative for chills and fever.   HENT: Negative for congestion.        OBJECTIVE    Vitals:    01/16/21 1440   BP: 122/83   Pulse: (!) 103   Resp: 16   Temp: 98.3  F (36.8  C)   TempSrc: Oral   SpO2: 96%       Physical Exam  Constitutional:       General: She is not in acute distress.     Appearance: She is well-developed.   Eyes:      General:         Right eye: No discharge.         Left eye: No discharge.      Conjunctiva/sclera: Conjunctivae normal.   Pulmonary:      Effort: Pulmonary effort is normal.   Musculoskeletal: Normal range of motion.   Skin:     General: Skin is warm and dry.      Capillary Refill: Capillary refill takes less than 2 seconds.   Neurological:      Mental Status: She is alert and oriented to person, place, and time.   Psychiatric:         Behavior: Behavior normal.         Thought Content: Thought content normal.         Judgment: Judgment normal.         Labs/EKG:  Results for orders placed or performed in visit on 01/16/21   Urinalysis-UC if Indicated   Result Value Ref Range    Color, UA Yellow Colorless, Yellow, Straw, Light Yellow    Clarity, UA Cloudy (!) Clear    Glucose, UA Negative Negative    Bilirubin, UA Negative Negative    Ketones, UA Negative Negative    Specific Gravity, UA 1.025 1.005 - 1.030    Blood, UA Small (!) Negative    pH, UA 7.0 5.0 - 8.0    Protein, UA Negative Negative mg/dL    Urobilinogen, UA 0.2 E.U./dL 0.2 E.U./dL, 1.0 E.U./dL    Nitrite, UA Negative Negative    Leukocytes, UA Small (!) Negative    Bacteria, UA Moderate (!) None Seen hpf    RBC, UA 5-10 (!) None Seen, 0-2 hpf    WBC, UA  10-25 (!) None Seen, 0-5 hpf    Squam Epithel, UA 0-5 None Seen, 0-5 lpf    Amorphous, UA Many (!) None Seen           Radiology:    No results found.      PATIENT INSTRUCTIONS:   Patient Instructions   We will call you if the urine culture shows that the bacteria causing the infection is resistant to the antibiotic we prescribed.    Macrobid 100 mg for 5 days     Macrobid half dose to prevent UTI after sexual intercourse - refills from this from your provider.  Also be sure to urinate immediately after.    Push water.      Recheck if not better in 2-3 days.
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
## 2021-06-14 NOTE — PROGRESS NOTES
"  Assessment & Plan     Lymphadenopathy left supraclavicular space medial aspect.  Suggest biopsy with Dr. Maximilian Leach at [facility] [phone].    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    Non-smoker.  Oral pharyngeal cancer was not HPV related.  No excess alcohol intake.    Review of external notes as documented above           15 minutes spent on the date of the encounter doing chart review, patient visit and documentation            No follow-ups on file.    Ed Caraballo MD  [facility]    Marshall Rene is 61 y.o. and presents to clinic today for the following health issues   HPI                 Review of Systems  No blood in stool or urine no blood in sputum.  No other adenopathy palpable by patient and neck or supraclavicular space or axilla.  No night sweats or constitutional symptoms of weight loss.  Appetite is good.  Non-smoker no excess alcohol.  Prior oral pharyngeal cancer was not HPV related.      Objective    /70 (Patient Site: Left Arm, Patient Position: Sitting)   Pulse 96   Temp 97.4  F (36.3  C)   Ht 5' 3.5\" (1.613 m)   Wt 137 lb (62.1 kg)   SpO2 96%   BMI 23.89 kg/m    Body mass index is 23.89 kg/m .  Physical Exam  Chest clear to auscultation and percussion.  Heart tones regular rhythm without murmur rub or gallop.  Abdomen soft nontender no organomegaly.  No peritoneal signs.  Extremities free of edema cyanosis or clubbing.  Neck veins nondistended no thyromegaly or scleral icterus noted, carotids full.  Skin warm and dry easily conversant good spirited.  Normal intelligence.  Neurologically intact no gross localizing findings.  Palpable node medial aspect left supraclavicular space mobile nontender not reddened in the area.              "

## 2021-06-14 NOTE — TELEPHONE ENCOUNTER
Refill Request  Did you contact pharmacy: [facility] pharmacy is calling  Medication name: Albutrol  Who prescribed the medication: CP  Pharmacy Name and Location: [facility]  Is patient out of medication: No, patient has requested to  RX with all her medications this afternoon  Patient notified refills processed in 72 hours:  yes  Okay to leave a detailed message: yes

## 2021-06-14 NOTE — TELEPHONE ENCOUNTER
OK to schedule. Order entered. Called patient. She is at another appointment now and will call back.

## 2021-06-14 NOTE — TELEPHONE ENCOUNTER
Patient was contacted and notified that Dr. Weiss is out of office until 02/15/21 and that we would have to reschedule her 02/01/21 surgery to 02/22/21 or after.    She is unable to do the later date. She will be starting a new job on 02/15/21 and will not be able to take time off until next year. She will follow up as able.

## 2021-06-14 NOTE — TELEPHONE ENCOUNTER
She would like to be off work for the least amount of time after surgery (partial phalangectomy fifth toe).Will she be in a post op shoe or boot? Also, she is not able to work in open toe foot wear, Will she be able to wear normal footwear after her second post op or sooner?    She would like to schedule for 02/01 or 02/08 and will call me back later today.

## 2021-06-14 NOTE — TELEPHONE ENCOUNTER
I spoke to patient and offered a consult with Dr. Romero on 02/01/21 and advised her we could get her surgery rescheduled to 02/04/21 if he agreed to go ahead with procedure. She said 02/04/21 would be too late as she needs as much time as possible to recover prior to starting her new job.    Patient has since had a consult with Dr. Tello at [facility]. He will be able to proceed with surgery 02/02/21.

## 2021-06-14 NOTE — TELEPHONE ENCOUNTER
New Appointment Needed  What is the reason for the visit:    Pre-Op Appt Request  When is the surgery? :  02-01-21  Where is the surgery?:   [facility]  Who is the surgeon? :  Dr. Weiss  What type of surgery is being done?: Foot  Provider Preference: Any available, the patient does not want to see Ayse Basurto.  How soon do you need to be seen?: tomorrow  Waitlist offered?: No  Okay to leave a detailed message:  Yes, please leave a message, she'll be at work.

## 2021-06-14 NOTE — TELEPHONE ENCOUNTER
Last office visit was 03/10/2020. Surgery was canceled last March due to COVID. Partial phalangectomy of 5th toe. She would like to reschedule surgery. Would like to see her in clinic first?    If ok to schedule without OV, please enter new order for surgery.

## 2021-06-16 NOTE — TELEPHONE ENCOUNTER
Reason for Call:  Medication or medication refill:    Do you use a [facility]?  Name of the pharmacy and phone number for the current request: [facility] pharmacy on file    Name of the medication requested:     Albuterol 90 mcg    Other request: Patient pharmacy requesting refill as patient is out of meds at this time and they have no refills available.  Please expedite if possible.    Can we leave a detailed message on this number? Yes    Phone number patient can be reached at: Home number on file [phone] (home)    Best Time: Any time    Call taken on 3/26/2021 at 11:41 AM by Akash Tilley

## 2021-06-17 NOTE — PATIENT INSTRUCTIONS - HE
"Patient Instructions by Bonny Recinos MD at 12/20/2019 10:00 AM     Author: Bonny Recinos MD Service: -- Author Type: Physician    Filed: 12/20/2019 10:23 AM Encounter Date: 12/20/2019 Status: Signed    : Bonny Recinos MD (Physician)         Patient Education     Bladder Infection, Female (Adult)    Urine is normally doesn't have any bacteria in it. But bacteria can get into the urinary tract from the skin around the rectum. Or they can travel in the blood from elsewhere in the body. Once they are in your urinary tract, they can cause infection in the urethra (urethritis), the bladder (cystitis), or the kidneys (pyelonephritis).  The most common place for an infection is in the bladder. This is called a bladder infection. This is one of the most common infections in women. Most bladder infections are easily treated. They are not serious unless the infection spreads to the kidney.  The phrases \"bladder infection,\" \"UTI,\" and \"cystitis\" are often used to describe the same thing. But they are not always the same. Cystitis is an inflammation of the bladder. The most common cause of cystitis is an infection.  Symptoms  The infection causes inflammation in the urethra and bladder. This causes many of the symptoms. The most common symptoms of a bladder infection are:    Pain or burning when urinating    Having to urinate more often than usual    Urgent need to urinate    Only a small amount of urine comes out    Blood in urine    Abdominal discomfort. This is usually in the lower abdomen above the pubic bone.    Cloudy urine    Strong- or bad-smelling urine    Unable to urinate (urinary retention)    Unable to hold urine in (urinary incontinence)    Fever    Loss of appetite    Confusion (in older adults)  Causes  Bladder infections are not contagious. You can't get one from someone else, from a toilet seat, or from sharing a bath.  The most common cause of bladder infections is bacteria from " the bowels. The bacteria get onto the skin around the opening of the urethra. From there, they can get into the urine and travel up to the bladder, causing inflammation and infection. This usually happens because of:    Wiping improperly after urinating. Always wipe from front to back.    Bowel incontinence    Pregnancy    Procedures such as having a catheter inserted    Older age    Not emptying your bladder. This can allow bacteria a chance to grow in your urine.    Dehydration    Constipation    Sex    Use of a diaphragm for birth control   Treatment  Bladder infections are diagnosed by a urine test. They are treated with antibiotics and usually clear up quickly without complications. Treatment helps prevent a more serious kidney infection.  Medicines  Medicines can help in the treatment of a bladder infection:    Take antibiotics until they are used up, even if you feel better. It is important to finish them to make sure the infection has cleared.    You can use acetaminophen or ibuprofen for pain, fever, or discomfort, unless another medicine was prescribed. If you have chronic liver or kidney disease, talk with your healthcare provider before using these medicines. Also talk with your provider if you've ever had a stomach ulcer or gastrointestinal bleeding, or are taking blood-thinner medicines.    If you are given phenazopydridine to reduce burning with urination, it will cause your urine to become a bright orange color. This can stain clothing.  Care and prevention  These self-care steps can help prevent future infections:    Drink plenty of fluids to prevent dehydration and flush out your bladder. Do this unless you must restrict fluids for other health reasons, or your doctor told you not to.    Proper cleaning after going to the bathroom is important. Wipe from front to back after using the toilet to prevent the spread of bacteria.    Urinate more often. Don't try to hold urine in for a long time.    Wear  loose-fitting clothes and cotton underwear. Avoid tight-fitting pants.    Improve your diet and prevent constipation. Eat more fresh fruit and vegetables, and fiber, and less junk and fatty foods.    Avoid sex until your symptoms are gone.    Avoid caffeine, alcohol, and spicy foods. These can irritate your bladder.    Urinate right after intercourse to flush out your bladder.    If you use birth control pills and have frequent bladder infections, discuss it with your doctor.  Follow-up care  Call your healthcare provider if all symptoms are not gone after 3 days of treatment. This is especially important if you have repeat infections.  If a culture was done, you will be told if your treatment needs to be changed. If directed, you can call to find out the results.  If X-rays were done, you will be told if the results will affect your treatment.  Call 911  Call 911 if any of the following occur:    Trouble breathing    Hard to wake up or confusion    Fainting or loss of consciousness    Rapid heart rate  When to seek medical advice  Call your healthcare provider right away if any of these occur:    Fever of 100.4 F (38.0 C) or higher, or as directed by your healthcare provider    Symptoms are not better by the third day of treatment    Back or belly (abdominal) pain that gets worse    Repeated vomiting, or unable to keep medicine down    Weakness or dizziness    Vaginal discharge    Pain, redness, or swelling in the outer vaginal area (labia)  Date Last Reviewed: 10/1/2016    9511-9759 [employer]. [address] [place] [address], [place] [address]. All rights reserved. This information is not intended as a substitute for professional medical care. Always follow your healthcare professional's instructions.

## 2021-06-17 NOTE — PATIENT INSTRUCTIONS - HE
"Patient Instructions by Eri Rodriguez MD at 10/7/2019 12:00 PM     Author: Eri Rodriguez MD Service: -- Author Type: Physician    Filed: 10/7/2019 12:52 PM Encounter Date: 10/7/2019 Status: Addendum    : Eri Rodriguez MD (Physician)    Related Notes: Original Note by Eri Rodriguez MD (Physician) filed at 10/7/2019 12:52 PM       1. Keep well hydrated  2. May alternate Tylenol every 6 hours with ibuprofen every 6 hours as needed for fever or pain  3. Keep your follow up appointment at [facility] tomorrow  4. If you have any questions, call the clinic number  - it's answered 24/7    Patient Education     Viral Syndrome (Adult)  A viral illness may cause a number of symptoms. The symptoms depend on the part of the body that the virus affects. If it settles in your nose, throat, and lungs, it may cause cough, sore throat, congestion, and sometimes headache. If it settles in your stomach and intestinal tract, it may cause vomiting and diarrhea. Sometimes it causes vague symptoms like \"aching all over,\" feeling tired, loss of appetite, or fever.  A viral illness usually lasts 1 to 2 weeks, but sometimes it lasts longer. In some cases, a more serious infection can look like a viral syndrome in the first few days of the illness. You may need another exam and additional tests to know the difference. Watch for the warning signs listed below.  Home care  Follow these guidelines for taking care of yourself at home:    If symptoms are severe, rest at home for the first 2 to 3 days.    Stay away from cigarette smoke - both your smoke and the smoke from others.    You may use over-the-counter acetaminophen or ibuprofen for fever, muscle aching, and headache, unless another medicine was prescribed for this. If you have chronic liver or kidney disease or ever had a stomach ulcer or GI bleeding, talk with your doctor before using these medicines. No one who is younger than 18 and ill with a fever should take " aspirin. It may cause severe disease or death.    Your appetite may be poor, so a light diet is fine. Avoid dehydration by drinking 8 to 12 8-ounce glasses of fluids each day. This may include water; orange juice; lemonade; apple, grape, and cranberry juice; clear fruit drinks; electrolyte replacement and sports drinks; and decaffeinated teas and coffee. If you have been diagnosed with a kidney disease, ask your doctor how much and what types of fluids you should drink to prevent dehydration. If you have kidney disease, drinking too much fluid can cause it build up in the your body and be dangerous to your health.    Over-the-counter remedies won't shorten the length of the illness but may be helpful for cough, sore throat; and nasal and sinus congestion. Don't use decongestants if you have high blood pressure.  Follow-up care  Follow up with your healthcare provider if you do not improve over the next week.  Call 911  Call 911 if any of the following occur:    Convulsion    Feeling weak, dizzy, or like you are going to faint    Chest pain, shortness of breath, wheezing, or difficulty breathing  When to seek medical advice  Call your healthcare provider right away if any of these occur:    Cough with lots of colored sputum (mucus) or blood in your sputum    Chest pain, shortness of breath, wheezing, or difficulty breathing    Severe headache; face, neck, or ear pain    Severe, constant pain in the lower right side of your belly (abdominal)    Continued vomiting (cant keep liquids down)    Frequent diarrhea (more than 5 times a day); blood (red or black color) or mucus in diarrhea    Feeling weak, dizzy, or like you are going to faint    Extreme thirst    Fever of 100.4 F (38 C) or higher, or as directed by your healthcare provider  Date Last Reviewed: 9/25/2015 2000-2017 [employer]. [address] [place] [address], [place] [address]. All rights reserved. This information is not intended as a substitute for  professional medical care. Always follow your healthcare professional's instructions.

## 2021-06-17 NOTE — PATIENT INSTRUCTIONS - HE
"Patient Instructions by Dahlia Gomez CNP at 7/1/2019  5:50 PM     Author: Dahlia Gomez CNP Service: -- Author Type: Nurse Practitioner    Filed: 7/1/2019  6:47 PM Encounter Date: 7/1/2019 Status: Signed    : Dahlia Gomez CNP (Nurse Practitioner)         Patient Education     Bladder Infection, Female (Adult)    Urine is normally doesn't have any bacteria in it. But bacteria can get into the urinary tract from the skin around the rectum. Or they can travel in the blood from elsewhere in the body. Once they are in your urinary tract, they can cause infection in the urethra (urethritis), the bladder (cystitis), or the kidneys (pyelonephritis).  The most common place for an infection is in the bladder. This is called a bladder infection. This is one of the most common infections in women. Most bladder infections are easily treated. They are not serious unless the infection spreads to the kidney.  The phrases \"bladder infection,\" \"UTI,\" and \"cystitis\" are often used to describe the same thing. But they are not always the same. Cystitis is an inflammation of the bladder. The most common cause of cystitis is an infection.  Symptoms  The infection causes inflammation in the urethra and bladder. This causes many of the symptoms. The most common symptoms of a bladder infection are:    Pain or burning when urinating    Having to urinate more often than usual    Urgent need to urinate    Only a small amount of urine comes out    Blood in urine    Abdominal discomfort. This is usually in the lower abdomen above the pubic bone.    Cloudy urine    Strong- or bad-smelling urine    Unable to urinate (urinary retention)    Unable to hold urine in (urinary incontinence)    Fever    Loss of appetite    Confusion (in older adults)  Causes  Bladder infections are not contagious. You can't get one from someone else, from a toilet seat, or from sharing a bath.  The most common cause of bladder infections is bacteria " from the bowels. The bacteria get onto the skin around the opening of the urethra. From there, they can get into the urine and travel up to the bladder, causing inflammation and infection. This usually happens because of:    Wiping improperly after urinating. Always wipe from front to back.    Bowel incontinence    Pregnancy    Procedures such as having a catheter inserted    Older age    Not emptying your bladder. This can allow bacteria a chance to grow in your urine.    Dehydration    Constipation    Sex    Use of a diaphragm for birth control   Treatment  Bladder infections are diagnosed by a urine test. They are treated with antibiotics and usually clear up quickly without complications. Treatment helps prevent a more serious kidney infection.  Medicines  Medicines can help in the treatment of a bladder infection:    Take antibiotics until they are used up, even if you feel better. It is important to finish them to make sure the infection has cleared.    You can use acetaminophen or ibuprofen for pain, fever, or discomfort, unless another medicine was prescribed. If you have chronic liver or kidney disease, talk with your healthcare provider before using these medicines. Also talk with your provider if you've ever had a stomach ulcer or gastrointestinal bleeding, or are taking blood-thinner medicines.    If you are given phenazopydridine to reduce burning with urination, it will cause your urine to become a bright orange color. This can stain clothing.  Care and prevention  These self-care steps can help prevent future infections:    Drink plenty of fluids to prevent dehydration and flush out your bladder. Do this unless you must restrict fluids for other health reasons, or your doctor told you not to.    Proper cleaning after going to the bathroom is important. Wipe from front to back after using the toilet to prevent the spread of bacteria.    Urinate more often. Don't try to hold urine in for a long  time.    Wear loose-fitting clothes and cotton underwear. Avoid tight-fitting pants.    Improve your diet and prevent constipation. Eat more fresh fruit and vegetables, and fiber, and less junk and fatty foods.    Avoid sex until your symptoms are gone.    Avoid caffeine, alcohol, and spicy foods. These can irritate your bladder.    Urinate right after intercourse to flush out your bladder.    If you use birth control pills and have frequent bladder infections, discuss it with your doctor.  Follow-up care  Call your healthcare provider if all symptoms are not gone after 3 days of treatment. This is especially important if you have repeat infections.  If a culture was done, you will be told if your treatment needs to be changed. If directed, you can call to find out the results.  If X-rays were done, you will be told if the results will affect your treatment.  Call 911  Call 911 if any of the following occur:    Trouble breathing    Hard to wake up or confusion    Fainting or loss of consciousness    Rapid heart rate  When to seek medical advice  Call your healthcare provider right away if any of these occur:    Fever of 100.4 F (38.0 C) or higher, or as directed by your healthcare provider    Symptoms are not better by the third day of treatment    Back or belly (abdominal) pain that gets worse    Repeated vomiting, or unable to keep medicine down    Weakness or dizziness    Vaginal discharge    Pain, redness, or swelling in the outer vaginal area (labia)  Date Last Reviewed: 10/1/2016    5607-9362 [employer]. [address] [place] [address], [place] [address]. All rights reserved. This information is not intended as a substitute for professional medical care. Always follow your healthcare professional's instructions.

## 2021-06-18 NOTE — PATIENT INSTRUCTIONS - HE
How Severe Are Your Spot(S)?: mild Patient Instructions by Ayse Basurto CNP at 11/11/2020  8:40 AM     Author: Ayse Basurto CNP Service: -- Author Type: Nurse Practitioner    Filed: 11/11/2020  9:07 AM Encounter Date: 11/11/2020 Status: Signed    : Ayse Basurto CNP (Nurse Practitioner)         Patient Education     Managing Tension-type Headache Symptoms  A tension-type headache can develop slowly. Being aware of the symptoms helps you recognize a headache early. Then you can act to reduce pain and relieve tension. Methods for relieving your symptoms include self-care and medicine.    Tension-type symptoms  The earlier you recognize the symptoms of a tension-type headache, the easier it is to treat. Tension-type headaches may:    Start with fatigue, tension, or pain in the neck and shoulders    Feel like a band around the head    Be concentrated in the temple, the back of the head, behind the eyes, or in the face    Come and go, or last for days, weeks, or even longer    Involve referred pain -- this means that the area that hurts may not be where the problem starts  Self-care during a tension-type headache  When you have a tension-type headache, there are things you can do to relax, loosen muscles, and reduce the pain:    Brush your scalp lightly with a soft hairbrush.    Give yourself a massage. Knead the muscles running from your shoulders up the back of your skull. Or ask a friend to gently massage your neck and shoulders.    Use an ice pack. Wrap a thin cloth around a cold pack, a cold can of soda, or a bag of frozen vegetables. Apply this directly to the place where you feel pain (such as your neck or temples).    Use moist heat to relax your muscles. Take a warm shower or bath. Or drape a warm, moist towel around your neck and shoulders.  Relieving pain and tension  Over-the-counter or prescription medicine can help relieve pain. Another way to reduce your pain is to use relaxation techniques to loosen  What Is The Reason For Today's Visit?: Full Body Skin Examination tight muscles.  Medicine  Medicine used for tension-type headaches include the following:    NSAIDs (nonsteroidal anti-inflammatories), such as aspirin and ibuprofen, relieve inflammation and help block pain signals.    Acetaminophen treats pain, and some formulations contain caffeine.     Muscle relaxants can reduce painful muscle contractions.  Taking medicine safely  Be aware that:    Taking analgesics (pain relievers) or drinking too much coffee may lead to rebound headaches (frequent or severe headaches that can happen if you miss a dose of medication), so take pain medicines only as needed. If you think you have these headaches, contact your healthcare provider.    Taking too much medicine can cause sleep problems or stomach upset. Some over-the-counter headache medications may contain caffeine. These may disrupt sleep and worsen pain.  Relaxation techniques  A , class, book, or tape may help you learn these techniques. One or more of these methods may work for you:    Deep breathing. Slow, calm, deep breathing can help you relax. Breathe in for a count of 5 or more. Then slowly let the breath out.    Visualization. Imagining a peaceful, secure scene can give you a sense of control over your body and surroundings.    Progressive relaxation. This is done by tightening and then releasing muscle groups. Start at the top of your head and work your way down your body. Tighten each muscle group for 5 to 10 seconds. Then release the muscle group for the same amount of time.    Biofeedback. This is a type of training in which you learn to control certain physical functions and responses. This helps you learn to reduce muscle tension.  Date Last Reviewed: 4/1/2018 2000-2019 [employer]. [address] [place] [address], [place] [address]. All rights reserved. This information is not intended as a substitute for professional medical care. Always follow your healthcare professional's instructions.        "    Patient Education     Self-Care for Headaches    Most headaches aren't serious and can be relieved with self-care. But some headaches may be a sign of another health problem like eye trouble or high blood pressure. To find the best treatment, learn what kind of headaches you get. For tension headaches, self-care will usually help. To treat migraines, ask your healthcare provider for advice. It is also possible to get both tension and migraine headaches. Self-care involves relieving the pain and avoiding headache triggers if you can.  Ways to reduce pain and tension  Try these steps:    Apply a cold compress or ice pack to the pain site.    Drink fluids. If nausea makes it hard to drink, try sucking on ice.    Rest. Protect yourself from bright light and loud noises.    Calm your emotions by imagining a peaceful scene.    Massage tight neck, shoulder, and head muscles.    To relax muscles, soak in a hot bath or use a hot shower.  Use medicines  Aspirin or other over-the-counter pain medicines, such as ibuprofen and acetaminophen, can relieve headache. Remember: Never give aspirin to anyone 18 years old or younger because of the risk of developing Reye syndrome. Use pain medicines only when needed. Certain prescription medicines, if taken too often, can lead to rebound headaches. Check with your healthcare provider or pharmacist about your medicines.  Track your headaches  Keeping a headache diary can help you and your healthcare provider identify what's causing your headaches:    Note when each headache happens.    Identify your activities and the foods you've eaten 6 to 8 hours before the headache began.    Look for any trends or \"triggers.\"  Signs of tension headache  Any of the following can be signs:    Dull pain or feeling of pressure in a tight band around your head    Pain in your neck or shoulders    Headache without a definite beginning or end    Headache after an activity such as driving or working on a " What Is The Reason For Today's Visit? (Being Monitored For X): concerning skin lesions on an annual basis "computer  Signs of migraine  Any of the following can be signs:    Throbbing pain on one or both sides of your head    Nausea or vomiting    Extreme sensitivity to light, sound, and smells    Bright spots, flashes, or other visual changes    Pain or nausea so severe that you can't continue your daily activities  Call your healthcare provider   If you have any of the following symptoms, contact your healthcare provider:    A headache that lingers after a recent injury or bump to the head.    A fever with a stiff neck or pain when you bend your head toward your chest.    A headache along with slurred speech, changes in your vision, or numbness or weakness in your arms or legs.    A headache for longer than 3 days.    Frequent headaches, especially in the morning.    Headaches with seizures     Seek immediate medical attention if you have a headache that you would call \"the worst headache you have ever had.\"  Date Last Reviewed: 3/1/2018    9946-5038 [employer]. [address] [place] [address], [place] [address]. All rights reserved. This information is not intended as a substitute for professional medical care. Always follow your healthcare professional's instructions.                "

## 2021-06-18 NOTE — PATIENT INSTRUCTIONS - HE
Patient Instructions by Fitz Torres MD at 1/25/2021  7:00 AM     Author: Fitz Torres MD Service: -- Author Type: Physician    Filed: 1/25/2021  7:39 AM Encounter Date: 1/25/2021 Status: Signed    : Fitz Torres MD (Physician)         Lifestyle Changes to Control Cholesterol  You can control your cholesterol through diet, exercise, weight management, quitting smoking, stress management, and taking your medicines right. These things can also lower your risk for cardiovascular disease.    Eating healthy  Your healthcare provider will give you information on diet changes you may need to make. Your provider may recommend that you see a registered dietitian for help with diet changes. Changes may include:    Cutting back on the amount of fat and cholesterol in your meals    Eating less salt (sodium). This is especially important if you also have high blood pressure.    Eating more fresh vegetables and fruits    Eating lean proteins such as fish, poultry, beans, and peas    Eating less red meat and processed meats    Using low-fat dairy products    Using vegetable and nut oils in limited amounts    Limiting how many sweets and processed foods like chips, cookies, and baked goods that you eat     Limiting how many sugar-sweetened beverages you drink    Limiting how often you eat out    Limiting alcohol  Getting exercise  Regular exercise is a good way to help your body control cholesterol. Regular exercise can help in many ways. It can:    Raise your good cholesterol    Help lower your bad cholesterol    Let blood flow better through your body    Give more oxygen to your muscles and tissues    Help you manage your weight    Help your heart pump better    Lower your blood pressure  Your healthcare provider may recommend that you get more physical activity if you haven't been active. Your provider may recommend that you get moderate to vigorous physical activity for at least  40 minutes each day. You should do this for at least 3 to 4 days each week. A few examples of moderate to vigorous activity are:    Walking at a brisk pace. This is about 3 to 4 miles per hour.    Jogging or running    Swimming or water aerobics    Hiking    Dancing    Martial arts    Tennis    Riding a bicycle or stationary bike    Dancing  Managing your weight  If you are overweight or obese, your healthcare provider will work with you to help you lose weight and lower your BMI (body mass index). Making diet changes and getting more physical activity can help. Changing your diet will help you lose weight more easily than adding exercise.  Quitting smoking  Smoking and other tobacco use can raise cholesterol and make it harder to control. Quitting is tough. But millions of people have given up tobacco for good. You can quit, too! Think about some of the reasons below to quit smoking. Do any of them make you think twice about your smoking habit?  Stop smoking because it:    Keeps your cholesterol high, even if you make all the other changes youre supposed to    Damages your body. It especially harms your heart, lungs, skin, and blood vessels.    Makes you more likely to have a heart attack (acute myocardial infarction), stroke, or cancer    Stains your teeth    Makes your skin, clothes, and breath smell bad    Costs a lot of money  Ask your healthcare provider for medicines or nicotine replacement products to help you quit.  Controlling stress   Learn ways to control stress. This will help you deal with stress in your home and work life. Controlling stress can greatly lower your risk of getting cardiovascular disease.  Making the most of medicines  Healthy eating and exercise are a good start to keeping your cholesterol down. But you may need some extra help from medicine. If your doctor prescribes medicine, be sure to take it exactly as directed. Remember:    Tell your healthcare provider about all other medicines  you take. This includes vitamins and herbs.    Tell your healthcare provider if you have any side effects after starting to take a medicine. Examples of side effects to watch for include muscle aches, weakness, blurred vision, rust-colored urine, yellowing of eyes or skin (jaundice), abdominal pain, and headache.    Dont skip a dose or stop taking your medicine because you feel better or because your cholesterol numbers go down. Never stop taking your medicine unless your healthcare provider has told you its OK.    Ask your healthcare provider if you have any questions about your medicines.  Risk groups  Some people may need to take medicines called statins to control their cholesterol. This is in addition to eating a healthy diet and getting regular exercise. The dose or intensity of the statin depends on your risk factors.   Statins can help you stay healthy. They can also help prevent a heart attack or stroke. You may need to take a statin if you are in one of the intermediate, high, or very-high risk groups or if you have a multiple risk (enhancers) conditions.  Calculating a risk score is done looking at certain risk factors that are considered as an increased risk for atherosclerosis. The risk calculator tool looks at:    Ages between 40-79 years of age    Gender    Race    Blood pressure (systolic and diastolic measurements)    Total cholesterol, good cholesterol (HDL), and bad cholesterol (LDL) levels    History of diabetes    Current or past smoking history    Treatment for high blood pressure    Treatment of cholesterol with a statin    Current use of aspirin  If you are uncertain about your risk factors, you and your provider may decide to proceed with a scan to determine a coronary artery calcium (CAC) score. Depending on the results of this scan you and your provider may decide on whether starting a medicine for cholesterol is the best treatment option for you.   Talk with your healthcare provider about  your treatment goals. Make sure you understand why these goals are important, based on your own health history and your family history of heart disease or high cholesterol.  Make a plan to have regular cholesterol checks. You may need to fast before getting this test. Also ask your provider about any side effects your medicines may cause. Let your provider know about any side effects you have. You may need to take more than one medicine to reach the cholesterol goals that you and your provider decide on.

## 2021-06-19 NOTE — LETTER
Letter by Meg Stallworth PA-C at      Author: Meg Stallworth PA-C Service: -- Author Type: --    Filed:  Encounter Date: 9/23/2019 Status: Signed         Selam Rene  [address] [place] [address]             September 23, 2019         Dear Ms. Edgard,    Below are the results from your recent visit:    Resulted Orders   Basic Metabolic Panel   Result Value Ref Range    Sodium 143 136 - 145 mmol/L    Potassium 4.3 3.5 - 5.0 mmol/L    Chloride 106 98 - 107 mmol/L    CO2 29 22 - 31 mmol/L    Anion Gap, Calculation 8 5 - 18 mmol/L    Glucose 83 70 - 125 mg/dL    Calcium 10.4 8.5 - 10.5 mg/dL    BUN 12 8 - 22 mg/dL    Creatinine 0.69 0.60 - 1.10 mg/dL    GFR MDRD Af Amer >60 >60 mL/min/1.73m2    GFR MDRD Non Af Amer >60 >60 mL/min/1.73m2    Narrative    Fasting Glucose reference range is 70-99 mg/dL per  American Diabetes Association (ADA) guidelines.   HM1 (CBC with Diff)   Result Value Ref Range    WBC 7.4 4.0 - 11.0 thou/uL    RBC 4.22 3.80 - 5.40 mill/uL    Hemoglobin 13.6 12.0 - 16.0 g/dL    Hematocrit 38.9 35.0 - 47.0 %    MCV 92 80 - 100 fL    MCH 32.3 27.0 - 34.0 pg    MCHC 35.0 32.0 - 36.0 g/dL    RDW 11.4 11.0 - 14.5 %    Platelets 307 140 - 440 thou/uL    MPV 7.3 7.0 - 10.0 fL    Neutrophils % 43 (L) 50 - 70 %    Lymphocytes % 41 (H) 20 - 40 %    Monocytes % 7 2 - 10 %    Eosinophils % 8 (H) 0 - 6 %    Basophils % 1 0 - 2 %    Neutrophils Absolute 3.2 2.0 - 7.7 thou/uL    Lymphocytes Absolute 3.0 0.8 - 4.4 thou/uL    Monocytes Absolute 0.6 0.0 - 0.9 thou/uL    Eosinophils Absolute 0.6 (H) 0.0 - 0.4 thou/uL    Basophils Absolute 0.1 0.0 - 0.2 thou/uL        Labs are normal,    Please call with questions or contact us using [email or web address].    Sincerely,        Electronically signed by Meg Stallworth PA-C

## 2021-06-19 NOTE — LETTER
Letter by Meg Stallworth PA-C at      Author: Meg Stallworth PA-C Service: -- Author Type: --    Filed:  Encounter Date: 5/9/2019 Status: (Other)         May 9, 2019     Patient: Selam Rene   YOB: 1960   Date of Visit: 5/9/2019       To Whom It May Concern:    It is my medical opinion that Selam Rene may return to full duty immediately with no restrictions.    If you have any questions or concerns, please don't hesitate to call.    Sincerely,        Electronically signed by Meg Stallworth PA-C

## 2021-06-19 NOTE — LETTER
Letter by Meg Stallworth PA-C at      Author: Meg Stallworth PA-C Service: -- Author Type: --    Filed:  Encounter Date: 9/20/2019 Status: (Other)         September 20, 2019     Patient: Selam Rene   YOB: 1960   Date of Visit: 9/20/2019       To Whom it May Concern:    Selam Rene was seen in my clinic on 9/20/2019. She was off on 9/17/19 and today for current medical concerns.     If you have any questions or concerns, please don't hesitate to call.    Sincerely,         Electronically signed by Meg Stallworth PA-C

## 2021-06-19 NOTE — LETTER
Letter by Ayse Basurto CNP at      Author: Ayse Basurto CNP Service: -- Author Type: --    Filed:  Encounter Date: 4/25/2019 Status: (Other)         Selam Rene  [address] [place] [address]             April 25, 2019         Dear MsNicole Rene,    Below are the results from your recent visit:    Resulted Orders   XR Chest 2 Views    Narrative    EXAM DATE:         04/25/2019    EXAM: X-RAY CHEST, 2 VIEWS, FRONTAL AND LATERAL  LOCATION: [facility]  DATE/TIME: 4/25/2019 10:15 AM    INDICATION: Enlarged lymph nodes, unspecified Personal history of malignant  neoplasm of breast  COMPARISON: 7/31/2018    FINDINGS: No change. Heart size and pulmonary vessels are normal. Lungs are  clear. Breast implants present. Surgical clips present within the right axilla.                  Negative chest x-ray.  Recommend completion of ultrasound as we discussed.     Please call with questions or contact us using [employer].    Sincerely,        Electronically signed by Ayse Basurto CNP

## 2021-06-19 NOTE — LETTER
Letter by Meg Stallworth PA-C at      Author: Meg Stallworth PA-C Service: -- Author Type: --    Filed:  Encounter Date: 6/7/2019 Status: (Other)         June 7, 2019     Patient: Selam Rene   YOB: 1960   Date of Visit: 6/7/2019       To Whom It May Concern:    It is my medical opinion that Selam Rene .    If you have any questions or concerns, please don't hesitate to call.    Sincerely,        Electronically signed by Meg Stallworth PA-C

## 2021-06-19 NOTE — LETTER
Letter by Meg Stallworth PA-C at      Author: Meg Stallworth PA-C Service: -- Author Type: --    Filed:  Encounter Date: 6/24/2019 Status: (Other)         Selam Rene  [address] [place] [address]             June 24, 2019         Dear Ms. Edgard,    Below are the results from your recent visit:    Resulted Orders   Urinalysis-UC if Indicated   Result Value Ref Range    Color, UA Yellow Colorless, Yellow, Straw, Light Yellow    Clarity, UA Clear Clear    Glucose, UA Negative Negative    Bilirubin, UA Negative Negative    Ketones, UA Negative Negative    Specific Gravity, UA 1.020 1.005 - 1.030    Blood, UA Negative Negative    pH, UA 6.5 5.0 - 8.0    Protein, UA Negative Negative mg/dL    Urobilinogen, UA 0.2 E.U./dL 0.2 E.U./dL, 1.0 E.U./dL    Nitrite, UA Negative Negative    Leukocytes, UA Trace (!) Negative    Bacteria, UA None Seen None Seen hpf    RBC, UA None Seen None Seen, 0-2 hpf    WBC, UA 0-5 None Seen, 0-5 hpf    Squam Epithel, UA 0-5 None Seen, 0-5 lpf    Narrative    Urine Culture ordered based on [facility] criteria   Culture, Urine   Result Value Ref Range    Culture No Growth                Urine testing is normal, no infection.    Please call with questions or contact us using [employer].    Sincerely,        Electronically signed by Meg Stallworth PA-C

## 2021-06-19 NOTE — LETTER
Letter by Meg Stallworth PA-C at      Author: Meg Stallworth PA-C Service: -- Author Type: --    Filed:  Encounter Date: 6/13/2019 Status: (Other)         Selam Rene  [address] [place] [address]             June 13, 2019         Dear Ms. Rene,    Below are the results from your recent visit:    Resulted Orders   MR Lumbar Spine Without Contrast    Narrative    EXAM DATE:         06/12/2019    EXAM: MRI LUMBAR SPINE W/O CONTRAST  LOCATION: [facility]  DATE/TIME: 6/12/2019 6:00 PM    INDICATION: Spinal stenosis, lumbar. Low back pain.  COMPARISON: 8/21/2012 MR.  TECHNIQUE: Without IV contrast.    FINDINGS:  Nomenclature is based on 5 lumbar type vertebral bodies. Normal vertebral body  heights, alignment and marrow signal. No evidence of anterolisthesis or  retrolisthesis at any lumbar interspace level. The conus tip is identified at  L2. Few small and moderate-sized simple cysts in the kidneys. The visualized  portions of the bony pelvis are normal for age.    T12-L1: Normal disc height and signal. No herniation. No facet arthropathy. No  spinal canal stenosis. No right neural foraminal stenosis. No left neural  foraminal stenosis.    L1-L2: Normal disc height and signal. No herniation. No facet arthropathy. No  spinal canal stenosis. No right neural foraminal stenosis. No left neural  foraminal stenosis.    L2-L3: Normal disc height and signal. No herniation. No facet arthropathy. No  spinal canal stenosis. No right neural foraminal stenosis. No left neural  foraminal stenosis.    L3-L4: Mild loss of T2 disc signal and preservation of the interspace height.  There is a mild broad-based annular bulge with superimposed small left  paracentral disc protrusion as seen on series 12 image 18. Very mild facet  arthropathy. No central spinal canal or neural foraminal stenosis.    L4-L5: Mild loss of T2 disc signal with preservation of the interspace height.  There is a mild  broad-based annular bulge and mild facet arthropathy. No central  spinal canal or neural foraminal stenosis.    L5-S1: Mild loss of T2 disc signal with preservation of the interspace height.  There is a mild broad-based annular bulge and mild facet arthropathy. No central  spinal canal or neural foraminal stenosis.    CONCLUSION:  1.  No evidence of central spinal canal or neural foraminal stenosis at any  lumbar interspace level.    2.  Small left paracentral disc protrusion at L3-L4.    3.  Mild annular bulges at L4-L5 and L5-S1.    4.  Overall no significant change since the prior 2012 MRI.                   Spinal stenosis is negative     Please call with questions or contact us using [employer].    Sincerely,        Electronically signed by Meg Stallworth PA-C

## 2021-06-19 NOTE — LETTER
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  Off work for 6/7/2019 through 6/9/2019.    If you have any questions or concerns, please don't hesitate to call.    Sincerely,        Electronically signed by Meg Stallworth PA-C

## 2021-06-19 NOTE — LETTER
Letter by Meg Stallworth PA-C at      Author: Meg Stallworth PA-C Service: -- Author Type: --    Filed:  Encounter Date: 6/13/2019 Status: (Other)         Selam Rene  [address] [place] [address]             June 13, 2019         Dear Ms. Rene,    Below are the results from your recent visit:    Resulted Orders   MR Lumbar Spine Without Contrast    Narrative    EXAM DATE:         06/12/2019    EXAM: MRI LUMBAR SPINE W/O CONTRAST  LOCATION: [facility]  DATE/TIME: 6/12/2019 6:00 PM    INDICATION: Spinal stenosis, lumbar. Low back pain.  COMPARISON: 8/21/2012 MR.  TECHNIQUE: Without IV contrast.    FINDINGS:  Nomenclature is based on 5 lumbar type vertebral bodies. Normal vertebral body  heights, alignment and marrow signal. No evidence of anterolisthesis or  retrolisthesis at any lumbar interspace level. The conus tip is identified at  L2. Few small and moderate-sized simple cysts in the kidneys. The visualized  portions of the bony pelvis are normal for age.    T12-L1: Normal disc height and signal. No herniation. No facet arthropathy. No  spinal canal stenosis. No right neural foraminal stenosis. No left neural  foraminal stenosis.    L1-L2: Normal disc height and signal. No herniation. No facet arthropathy. No  spinal canal stenosis. No right neural foraminal stenosis. No left neural  foraminal stenosis.    L2-L3: Normal disc height and signal. No herniation. No facet arthropathy. No  spinal canal stenosis. No right neural foraminal stenosis. No left neural  foraminal stenosis.    L3-L4: Mild loss of T2 disc signal and preservation of the interspace height.  There is a mild broad-based annular bulge with superimposed small left  paracentral disc protrusion as seen on series 12 image 18. Very mild facet  arthropathy. No central spinal canal or neural foraminal stenosis.    L4-L5: Mild loss of T2 disc signal with preservation of the interspace height.  There is a mild  broad-based annular bulge and mild facet arthropathy. No central  spinal canal or neural foraminal stenosis.    L5-S1: Mild loss of T2 disc signal with preservation of the interspace height.  There is a mild broad-based annular bulge and mild facet arthropathy. No central  spinal canal or neural foraminal stenosis.    CONCLUSION:  1.  No evidence of central spinal canal or neural foraminal stenosis at any  lumbar interspace level.    2.  Small left paracentral disc protrusion at L3-L4.    3.  Mild annular bulges at L4-L5 and L5-S1.    4.  Overall no significant change since the prior 2012 MRI.                    No spinal stenosis and no new disk changes since 2012.    Please call with questions or contact us using [employer].    Sincerely,        Electronically signed by Meg Stallworth PA-C

## 2021-06-19 NOTE — LETTER
Letter by Meg Stallworth PA-C at      Author: Meg Stallworth PA-C Service: -- Author Type: --    Filed:  Encounter Date: 9/6/2019 Status: (Other)       Selam Rene  [address]  [place] [address]          09/06/19    Dear  Selam Rene  [id number]    This letter is in regards to the appointment that you had scheduled on September 06, 2019 at the [facility] with Meg Calderon PA-C.     The [facility] strives to see all patients in a timely manner and we need your help to achieve this.  The above-mentioned appointment was missed and we do not have record of a cancellation by you.  Whenever possible, we request appointment cancellations at least 24 hours in advance.  This time allows us to offer the appointment to another patient in need.      If you feel you have received this letter in error, or if you need to reschedule this appointment, please call our office so that we may update our records.      Sincerely,    [facility]

## 2021-06-19 NOTE — LETTER
Letter by Meg Stallworth PA-C at      Author: Meg Stallworth PA-C Service: -- Author Type: --    Filed:  Encounter Date: 6/21/2019 Status: (Other)         June 21, 2019     Patient: Selam Rene   YOB: 1960   Date of Visit: 6/21/2019       To Whom it May Concern:    Selam Rene was seen in my clinic on 6/21/2019. Ten to 15 pound restriction on lifting until 7/1/19 and then can return to full lifting status.     If you have any questions or concerns, please don't hesitate to call.    Sincerely,         Electronically signed by Meg Stallworth PA-C

## 2021-06-19 NOTE — LETTER
Letter by Meg Stallworth PA-C at      Author: Meg Stallworth PA-C Service: -- Author Type: --    Filed:  Encounter Date: 5/3/2019 Status: (Other)         May 3, 2019     Patient: Selam Rene   YOB: 1960   Date of Visit: 5/3/2019       To Whom it May Concern:    Selam Rene was seen in my clinic on 5/3/2019. Off today and may return on 5/6/19. Light duty until non-painful. Can not lift more than 10 pounds until non-painful.     If you have any questions or concerns, please don't hesitate to call.    Sincerely,         Electronically signed by Meg Stallworth PA-C

## 2021-06-20 NOTE — LETTER
Letter by Cleo He MD at      Author: Cleo He MD Service: -- Author Type: --    Filed:  Encounter Date: 12/24/2019 Status: Signed         December 24, 2019     Patient: Selam Rene   YOB: 1960   Date of Visit: 12/24/2019       To Whom It May Concern:    It is my medical opinion that Selam Rene should remain out of work until December 31, 2019.  She should return for re-evaluation at that time if needed.    If you have any questions or concerns, please don't hesitate to call.    Sincerely,        Electronically signed by Cleo He MD

## 2021-06-20 NOTE — LETTER
Letter by Jade García, Genetic Counselor at      Author: Jade García, Genetic Counselor Service: -- Author Type: --    Filed:  Encounter Date: 4/2/2020 Status: (Other)         Selam Rene  [address] [place] [address]      April 2, 2020      Dear Ms. Rene,    It was a pleasure speaking with you on the phone on 04/02/2020.  Here is a copy of the progress note from our discussion.  If you have any additional questions, please feel free to call.    Referring Provider: Dr. Jc    Presenting Information:  I spoke with Selam over the phone today to discuss her genetic testing results. Her blood was drawn on 2/26/2020.  CancerNext-Expanded testing was ordered from [facility]. This testing was done because of her personal and family history of breast cancer and family history of colon and brain cancers and leukemia.    Genetic Testing Results: POSITIVE - CARRIER  Selam is POSITIVE for one MUTYH mutation, meaning that she is a CARRIER for MUTYH-Associated Polyposis (MAP). Specifically her mutation is called p.Y179C (also known as c.536A>G and p.Y165C). We discussed that this mutation is associated with a slightly increased risk for colon cancer. No other mutations were found in the MUTYH gene.    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 This variant is called p.G265R (also known as c.793G>A).  No other variants or mutations were found in the RAD51D gene. Given the uncertain significance of this result, medical management decisions should NOT be made based on this test result alone.    Of note, Selam is negative for pathogenic (harmful) mutations in the AIP, ALK, APC, ANNE MARIE, BAP1, BARD1, BLM, BRCA1, BRCA2, BRIP1, BMPR1A, CDH1, CDK4, CDKN1B, CDKN2A, CHEK2, DICER1, EPCAM, FANCC, FH, FLCN, GALNT12, GREM1, HOXB13, MAX, MEN1, MET, MITF, MLH1, MRE11A, MSH2, MSH6, NBN, NF1, NF2,  PALB2, PHOX2B, PMS2, POLD1, POLE, POT1, AAWTZ7O, PTCH1, PTEN, RAD50, RAD51C, RAD51D, RB1, RET, SDHA, SDHAF2, SDHB, SDHC, SDHD, SMAD4, SMARCA4, SMARCB1, SMARCE1, STK11, SUFU, TUSE658, TP53, TSC1, TSC2, VHL, and XRCC2 genes. No pathogenic (harmful) mutations were found in any of the remaining 66 genes analyzed. This test involved sequencing and deletion/duplication analysis of all genes with the exceptions of EPCAM and GREM1 (deletions/duplications only) and MITF (sequencing only).    Testing did not detect an identifiable mutation associated with Hereditary Breast and Ovarian Cancer syndrome (BRCA1, BRCA2), Pond syndrome (MLH1, MSH2, MSH6, PMS2, EPCAM), Familial Adenomatous Polyposis (APC), Hereditary Diffuse Gastric Cancer (CDH1), Familial Atypical Multiple Mole Melanoma syndrome (CDK4, CDKN2A), Juvenile Polyposis syndrome (BMPR1A, SMAD4), Cowden syndrome (PTEN), Li Fraumeni syndrome (TP53), Peutz-Jeghers syndrome (STK11), MUTYH Associated Polyposis (MUTYH),  Hereditary Leiomyomatosis and Renal Cell Cancer (FH), Uoow-Qqml-Hidw (FLCN), Hereditary Papillary Renal Carcinoma (MET), Hereditary Paraganglioma and Pheochromocytoma syndrome (SDHA, SDHAF2, SDHB, SDHC, SDHD), Multiple Endocrine Neoplasia type 2 (RET), Tuberous sclerosis complex (TSC1, TSC2), Von Hippel-Lindau disease (VHL), Neurofibromatosis type 1 (NF1), Neurofibromatosis type 2 (NF1), Multiple Endocrine Neoplasia type 1 (MEN1), Multiple Endocrine Neoplasia type 4 (CDKN1B), Gorlin syndrome (SUFU, PTCH1), or Hallman complex ([id number]).    MUTYH  Cancer Risks:   We reviewed that having only one MUTYH mutation means that an individual is a carrier for MAP. Approximately 1 in 100 (1%) individuals in the general population are carriers of MAP (carry a single mutation in the MUTYH gene).      Carriers of MAP have a very slightly increased risk of colon cancer (which could be as high as twice the population risk of 5-6%).     Several studies have also suggested that  women who carry one MUTYH mutation may be at some increased risk for breast cancer, though additional research is needed to clarify this potential risk.    We discussed that in comparison, individuals with MAP (who have two MUTYH mutations) can have hundreds to thousands of polyps, but most have fewer than 100. Individuals with MAP have up to an 80% risk of developing colon cancer by age 70 if not treated. MAP is also associated with an increased risk of duodenal cancer.      Cancer Screening and Prevention:  The following screening is recommended for individuals who have one mutation in the MUTYH gene, per current National Comprehensive Cancer Network (NCCN) guidelines:    Individuals who have a first degree relative (parent, child, sibling) with colon cancer should consider have a colonoscopy every five years starting at 40 (or 10 years before their relative's age of diagnosis, whichever is first).    It is uncertain if a specialized screening plan is necessary for those individuals who only have a second degree relative with colon cancer or do not have a family history of colon cancer.  We discussed the importance of keeping in contact with me, at least annually, to determine if guidelines have changed, as this may change how we approach colon screening for her in the future.     There are currently no other specific screening recommendations for other cancers potentially associated with one MUTYH mutation.    RAD51D  Interpretation:  We discussed several different interpretations of this inconclusive test result. It is not clear if this variant in the RAD51D gene is associated with increased cancer risk.  1. This variant may be a benign change that does not increase cancer risk.  2. This variant may be a harmful mutation that causes an increased risk for ovarian and female breast cancer.    We discussed that known pathogenic (harmful) mutations in the RAD51D gene have been associated with moderately increased  risk for ovarian cancer (between 10-12% lifetime risk) female breast cancer, and prostate cancer.     I reiterated that at this time, the variant of uncertain significance identified on Selam's testing is NOT considered a pathogenic (harmful) mutation and her screening/management recommendations will not change based on this result alone.    The laboratory is working to determine if this variant is harmful or benign, and they will contact me if it is reclassified. If this variant is determined to be a benign change, there may be a different gene or combination of genes and environment that are associated with the cancers in Selam and/or her relatives.      It is also important to consider that her other relatives with cancer history may have had a mutation in one of the genes tested and she did not inherit it.  There is also a small possibility that there is a mutation in one of these genes, and we could not find it with our current testing methods.     Other screening based on family history:    Selam should continue to follow her oncology team's recommendations for the treatment and follow-up for her cancer history.    Odilia close female relatives remain at increased risk for breast cancer given their family history. Breast cancer screening is generally recommended to begin approximately 10 years younger than the earliest age of breast cancer diagnosis in the family, or at age 40, whichever comes first. In this family, screening may begin at age 32. Breast screening options should be discussed with an individual's primary care provider and a genetic counselor, to determine at what age to begin screening, what screening is appropriate, and if additional screening (such as breast MRI) is necessary based on personal/family history factors.     We discussed that Selam's close relative likely have some increased risk for skin cancers such as basal cell carcinomas just given her and her mother's and brother's  histories.  Selam's close relatives are encouraged to discuss this history with their care providers.      Other population cancer screening options, such as those recommended by the American Cancer Society and the National Comprehensive Cancer Network (NCCN), are also appropriate for Selam and her family. These screening recommendations may change if there are changes to Selam's personal and/or family history. Final screening recommendations should be made by each individual's managing physician.    Inheritance/Implications for Family Members:  We reviewed the autosomal recessive inheritance of MAP. Individuals with MAP have a mutation within both copies of the MUTYH gene (two mutations, one that was inherited from each parent). When both parents are carriers for MAP, they have a 25% chance of having a child who inherited two mutations (affected with MAP), a 50% chance of having a child who inherited one mutation (carrier of MAP, with small increased risk for colon cancer), and a 25% chance of having a child who inherited neither mutation (unaffected; not a carrier). These chances apply to each pregnancy.     We discussed that testing for MUTYH mutations in the father of Odilia children is available, which would be helpful in determining risk for their children.  If Odilia childrens' fathers are a carrier, the above risks apply.  If they are not, their children would, at most, be carriers, and would just need to be tested for the mutation found in Selam. If Odilia fishs' fathers chooses not to have testing, her children should have full gene sequencing and deletion/duplication analysis of the MUTYH gene to best clarify their risk.     Genetic counseling is also recommended for Odilia siblings, as they have at least a 50% chance to carry the same MUTYH mutation identified in Selam. It will likely be recommended that they consider comprehensive testing of the MUTYH gene, though, as it is  possible that they could have MAP or carry a mutation in the MUTYH gene that is different than the one identified in Selam. I would be happy to see Odilia family members for testing.     We reviewed the autosomal dominant inheritance of the variant of uncertain significance in the RAD51D gene.  We discussed that Selam has a 50% chance to pass this variant to each of her children.   Likewise, each of her siblings has a 50% risk of having the same variant. Because it is unclear what, if any, risk is associated with this variant, clinical genetic testing for this RAD51C variant alone is not recommended for relatives.    Summary:  Selam has a single pathogenic MUTYH mutation and a variant of uncertain significance in her RAD51D gene; neither of which explain her personal and family history of cancer. Because of that, it is important that she continue with cancer screening based on her personal and family history as discussed above.    Genetic testing is rapidly advancing, and new cancer susceptibility genes will most likely be identified in the future. Therefore, I encouraged Selam to contact me annually or if there are changes in her personal or family history. This may change how we assess her cancer risk, screening, and the testing we would offer.    Plan:  1.  A copy of her results will be mailed to Selam.    2. She plans to follow-up with her oncology care team and her primary care providers.  3. She should contact me annually, or sooner if her family history changes.  4. I will contact Selam if the laboratory informs me that this VUS has been reclassified.  This may change screening and testing recommendations for Selam and her relatives.    If Selam has any further questions, I encouraged her to contact me at [phone].      Jade García MS, [facility]  Licensed Genetic Counselor  [facility]  [phone]

## 2021-06-20 NOTE — LETTER
Letter by Jade García, Genetic Counselor at      Author: Jade García, Genetic Counselor Service: -- Author Type: --    Filed:  Encounter Date: 2/26/2020 Status: (Other)         Selam Rene  [address] [place] [address]      February 26, 2020      Dear Ms. Rene,    It was a pleasure meeting with you at [facility] on 02/26/2020.  Here is a copy of the progress note from your recent genetic counseling visit.  If you have any additional questions, please feel free to call.    Referring Provider: Meg Stallworth, *    Present for Today's Visit: Selam    Presenting Information:   I met with Selam Rene today for genetic counseling at [facility] to discuss her personal and family history of brain cancer.  She is here today to review this history, cancer screening recommendations, and available genetic testing options.    Personal History:  Selam is a 60 y.o. female. 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 She underwent a resection and right radical neck dissection on 10/21/2019 with clear margins. She met with Dr. Jc on 01/07/2020 to and no further treatment was recommended. Plan is for imaging next week an follow-up with Dr. Jc in April.     Selam also has a history of right triple negative breast cancer diagnosed at age 42. She underwent bilateral mastectomies and adjuvant chemotherapy and radiation. Selam reports that she had genetic testing at the time of her breast cancer diagnosis in 2002 for BRCA mutation, and she reports this testing was negative. Selam has also had 3 basal cell carcinomas from 2018 to 2019; one on her nose, one between her upper lip and nose and one on her arm.    Selam's medical history also includes GERD, diverticulosis (s/p colon resection in 2015),  degenerative disc disease, and . She is s/p total abdominal hysterectomy in 2003 due to abnormal uterine bleeding.       She had her first menstrual period at age 13, her first child at age 17, and went through surgical menopause at age 43.  Selam has had her ovaries, fallopian tubes and uterus in removed as stated above.  She reports a history of oral contraceptive use from age [age] and that she has never been on hormone replacement therapy.      She no longer receives pap smears. Due to her breast surgery, she no longer receives breast cancer screening. She began having colonoscopies at the age of 42 due to rectal bleeding. She reports that she has had 4 or 5 total colonoscopies for gastrointestinal symptoms and no polyps needed to be removed. She reports that her most recent colonoscopy was in 2019 and no polyps were removed. She cannot remember when follow-up was recommended.  She does not regularly do any other cancer screening at this time.  Selam reported no tobacco use, and social (about 5 drinks/weekend) alcohol use.    Family History: (Please see scanned pedigree for detailed family history information)  Children/Siblings    Selam has a daughter, age 42, who Selam reports had pre-cervical cancer cells around age 20. This daughter has two daughters and a son. On of these daughter (granddaughter to Selam) as Bechet's syndrome.     Selam has a daughter, age 32, with no reported cancer history who has a history of type 1 diabetes, and Fahr's disease with hypoparathyroidism, hypothyroidism, Hashimoto's. Selam also reports that this daughter had kidney stones at age 5.     Selam has a son, age 25, who has no reported cancer history and a history of kidney stones at age 16.    Selam has a brother who passed away at age 3 from a heart condition.    Selam has a brother, age 65, who has COPD and a history of a basal cell carcinoma on his nose at age 65.    Selam has a brother, age 63, with no  reported cancer history and is s/p colon resection due to diverticulosis.    Selam has a sister, age 58, with no reported cancer history and a history of benign breast lesions. This sister is s/p NHUNG in her late 30's from menstrual issues and s/p colon resection for diverticulosis.    Selam has a sister, age 56, with no reported cancer history.    Selam has a maternal half-brother, age 48, with a history of a basal cell carcinoma on his arm and a squamous cell carcinoma on his face.     Selam has four nephews and two nieces; all reportedly healthy with no cancer histories.   Maternal    Selam's mother passed away at age 60 from emphysema with smoking history. Selam's mother had a basal cell carcinoma on her nose around age 55.    Selam has a maternal aunt who passed away in her 60's with a history of breast cancer diagnosed in her 40's.    Selam has a maternal aunt who passed away at age 65 from complications caused by a bladder infection with no reported cancer history.    Selam has a maternal uncle who passed away at age 60 from suicide with no reported cancer history.    Selam's maternal grandmother passed away at age 80 with a history of leukemia diagnosed at an unknown age.    Selam's maternal grandfather passed away at age 88 from age-related health issues with no reported cancer history.  Paternal    Selam's father passed away at age 66 due to complications from his alcoholism with no reported cancer history.    Selam has a number of paternal aunts and uncles who passed away at unknown ages from unknown causes with no reported cancer histories.     Selam has a paternal aunt who passed away in her 70's with a history of breast cancer diagnosed in her 40's.    Selam has a paternal first-cousin, age 65, with a history of throat cancer recently diagnosed. He was a smoker.    Selam has a paternal first-cousin who passed away in her 50's from a brain cancer diagnosed in her  50's.    Selam has a paternal first-cousin who passed away in her 60's from colon cancer diagnosed in her 60's. This cousin has a son who a history of a brain cancer.    Selam has a paternal first-cousin, age 72, who has a history of colon and liver cancer diagnosed in her 70's.    Selam's paternal grandmother passed away in her old age from age-related health issues and no reported cancer history.    Selam's paternal grandfather passed away at an unknown age with a history of lung cancer. He was a smoker.    Her maternal ethnicity is . Her paternal ethnicity is .  There is no known Ashkenazi Catholic ancestry on either side of her family. There is no reported consanguinity.    Discussion:    Selam's personal and family history of breast cancer is suggestive of a hereditary cancer syndrome.    We reviewed the features of sporadic, familial, and hereditary cancers. In looking at Selam's family history, it is possible that a cancer susceptibility gene is present due to her early-onset triple negative breast cancer, her maternal aunt's early-onset breast cancer, and her paternal aunt's early-onset breast cancer.    We discussed the natural history and genetics of hereditary breast cancers and hereditary cancer syndromes in general. A detailed handout regarding the information we discussed was provided to Selam at the end of our appointment today and can be found in the after visit summary. Topics included: inheritance pattern, cancer risks, cancer screening recommendations, and also risks, benefits and limitations of testing.    We reviewed that the most common cause of hereditary breast cancer is Hereditary Breast and Ovarian Cancer (HBOC) syndrome, which is caused by mutations in the genes BRCA1 and BRCA2. BRCA1 and BRCA2 are two genes that increase the risk for breast and ovarian cancers, among others. Women who inherit a BRCA mutation have a 50 to 85% lifetime risk of breast cancer and  a 15 to 45% lifetime risk of ovarian cancer. This is higher than the general population lifetime risks of 12% for breast cancer and less than 2% for ovarian cancer. Men with BRCA gene mutations have up to a 7% risk of breast cancer and 20% risk of prostate cancer. Other cancers, such as pancreatic cancer and melanoma, have also been associated with BRCA mutations.    Based on her personal and family history, Selam meets current National Comprehensive Cancer Network (NCCN) criteria for genetic testing of high penetrance breast and/or ovarian cancer susceptibility genes.      We discussed that there are additional genes that could cause increased risk for breast and  cancer. As many of these genes present with overlapping features in a family and accurate cancer risk cannot always be established based upon the pedigree analysis alone, it would be reasonable for Selam to consider panel genetic testing to analyze multiple genes at once.    We discussed Selam's family history of colon cancer and the genes associated with increased colon cancer risk. We also discussed her family history of brain cancers. I explained that without knowing the specific type of brain cancers/tumors, accurate risk assessment is difficult. I explained that in some cases, brain cancers can have an inherited genetic component. We discussed genetic testing option for the cancers in Selam and her family suggestive of a hereditary component. We discussed CustomNext-Cancer (CancerNext + polyposis genes AXIN2, MSH3, and NTHL1) which covers genes related to increased risk for breast, gynecologic, and gastrointestinal cancers as well as CancerNext-Expanded which covers genes related to increased risk for breast, gynecologic, gastrointestinal, neuroendocrine, renal, and brain cancers. Selam expressed wanting to learn as much information as possible from genetic testing. After our discussion, Selam opted to proceed with genetic testing via the  CancerNext-Expanded panel through [facility].  Genetic testing is available for 67 genes associated with increased risk for many different cancers: CancerNext-Expanded (AIP, ALK, APC, ANNE MARIE, BAP1, BARD1, BLM, BRCA1, BRCA2, BRIP1, BMPR1A, CDH1, CDK4, CDKN1B, CDKN2A, CHEK2, DICER1, EPCAM, FANCC, FH, FLCN, GALNT12, GREM1, HOXB13, MAX, MEN1, MET, MITF, MLH1, MRE11A, MSH2, MSH6, MUTYH, NBN, NF1, NF2, PALB2, PHOX2B, PMS2, POLD1, POLE, POT1, SKYIL3E, PTCH1, PTEN, RAD50, RAD51C, RAD51D, RB1, RET, SDHA, SDHAF2, SDHB, SDHC, SDHD, SMAD4, SMARCA4, SMARCB1, SMARCE1, STK11, SUFU, MFGI544, TP53, TSC1, TSC2, VHL, and XRCC2).  We discussed that many of the genes in the CancerNext-Expanded panel are associated with specific hereditary cancer syndromes and have published management guidelines:  Hereditary Breast and Ovarian Cancer syndrome (BRCA1, BRCA2), Pond syndrome (MLH1, MSH2, MSH6, PMS2, EPCAM), Familial Adenomatous Polyposis (APC), Hereditary Diffuse Gastric Cancer (CDH1), Familial Atypical Multiple Mole Melanoma syndrome (CDK4, CDKN2A), Juvenile Polyposis syndrome (BMPR1A, SMAD4), Cowden syndrome (PTEN), Li Fraumeni syndrome (TP53), Peutz-Jeghers syndrome (STK11), MUTYH Associated Polyposis (MUTYH), Hereditary Leiomyomatosis and Renal Cell Cancer (FH), Fcbx-Xcyg-Zpss (FLCN), Hereditary Papillary Renal Carcinoma (MET), Hereditary Paraganglioma and Pheochromocytoma syndrome (SDHA, SDHAF2, SDHB, SDHC, SDHD), Multiple Endocrine Neoplasia type 2 (RET), Tuberous sclerosis complex (TSC1, TSC2), Von Hippel-Lindau disease (VHL), Neurofibromatosis type 1 (NF1), Neurofibromatosis type 2 (NF1), Multiple Endocrine Neoplasia type 1 (MEN1), Multiple Endocrine Neoplasia type 4 (CDKN1B), Gorlin syndrome (SUFU, PTCH1), and Hallman complex (AZAGC2D).  The ANNE MARIE, BRIP1, CHEK2, GREM1, NBN, PALB2, POLD1, POLE, RAD51C, and RAD51D genes are associated with increased cancer risk and have published management guidelines for certain cancers.    The  remaining genes (AIP, ALK, BAP1, BARD1, BLM, DICER1, FANCC, GALNT12, HOXB13, MRE11A, MAX, MET, MITF, PHOX2B, POT1, RAD50, SMARCA4,SMARCB1, SMARCE1, EMAE000, and XRCC2) are associated with increased cancer risk and may allow us to make medical recommendations when mutations are identified.    Selam was provided with a detailed brochure from [facility] explaining the CancerNext-Expanded testing.  Consent was obtained and genetic testing for CancerNext-Expanded was sent to [facility].     Medical Management: For  Selam, we reviewed that the information from genetic testing may determine:    additional cancer screening for which Selam may qualify (i.e. more frequent colonoscopies, more frequent dermatologic exams, etc.),    and targeted chemotherapies for Selam if she were to develop certain cancers in the future (i.e. immunotherapy for individuals with Pond syndrome, PARP inhibitors, etc.).     These recommendations and possible targeted chemotherapies will be discussed in detail once genetic testing is completed.     Plan:  1) Today Selam elected to proceed with CancerNext-Expanded genetic testing (67 genes) through [facility].  2) This information should be available in approximately 4 weeks.  3) Selam will be contacted by our scheduling department to set up a result disclosure appointment either in person or over the phone.     Jade García MS, [facility]  Licensed Genetic Counselor  [facility]  [phone]

## 2021-06-20 NOTE — LETTER
Letter by Jennifer Hong RN at      Author: Jennifer Hong RN Service: -- Author Type: --    Filed:  Encounter Date: 12/23/2019 Status: Signed       Dear Selam Rene    Thank you for choosing [facility] for your care.  We are committed to providing you with the highest quality and compassionate healthcare services.  The following information pertains to your first appointment with our clinic.    Date/Time of appointment: Tuesday, January 7th, 2020 arrival of 10:45 am please!    Note:  This allows time to complete forms, possible labs and nursing assessment.     Name of your Physician: Naveen Jc MD    What to bring to your appointment:    Completed Patient History/Initial Nursing Assessment and Medication/Allergy List (these forms were sent to you).    Any paperwork or films from your physician that we have asked you to bring.    Your current insurance card(s).    Parking:    Please refer to the map included to direct you.  The [facility] is located at the [place] end of [address] in [place], [place].      After turning onto [address] from [address], take a right turn at the first stop sign.  We have designated parking on the left, identified as parking for Cancer Care patients (Lot D).     The Code to Enter Lot D is: 0101. This code changes monthly and will always coincide with the current month followed by 01. For example August will be 0801.  The month will continue to change but the 01 will remain constant.  If lot D is full please use Parking Lot A, directly across the street.    Please enter the Cancer Care Center on the north end of the \[facility]\"".  You will see a sign on the building.        For Medical Oncology or Hematology appointments, please take the elevator to the second floor to check in.   For Radiation Oncology appointments, please go straight through the double doors and check in.     Also please note appointments can last 1.5-2 hours.       We hope these instructions are helpful to you.  If you have any questions or concerns, please call us at [phone].  It is our pleasure to assist you.    Warm Regards,  Jennifer Hong  Nurse Navigator  [phone]

## 2021-06-20 NOTE — LETTER
Letter by Naveen Jc MD at      Author: Naveen Jc MD Service: -- Author Type: --    Filed:  Encounter Date: 9/30/2020 Status: (Other)                   Office Hours: Monday - Friday 8:00 - 4:30PM    Selam Rene  [address] [place] [address]           September 30, 2020      Dear Selam:    Our clinic records indicate we have attempted to contact you three (3) or more times to schedule a follow up appointment. Unfortunately, we have been unable to reach you. To prevent further delays in your care, please contact our office to schedule your appointment.         Sincerely,         [facility]  [phone]

## 2021-06-20 NOTE — LETTER
Letter by Naveen Jc MD at      Author: Naveen Jc MD Service: -- Author Type: --    Filed:  Encounter Date: 1/7/2020 Status: Signed                   Office Hours: Monday - Friday 8:00 - 4:30PM    Selam Rene  [address] [place] [address]           January 7, 2020      Dear Selam:    Dr. Jc had placed a referral for you to be seen by our genetic counselor. Unfortunately we tried to call you on the number we have, but that number was not working. If you would like to schedule this please call [phone].       Sincerely,        [facility]

## 2021-06-20 NOTE — LETTER
Letter by Meg Stallworth PA-C at      Author: Meg Stallworth PA-C Service: -- Author Type: --    Filed:  Encounter Date: 3/3/2020 Status: (Other)         Selam Rene  [address]  [place] [address]             March 3, 2020         Dear Ms. Rene,    Below are the results from your recent visit:    Resulted Orders   HM2(CBC w/o Differential)   Result Value Ref Range    WBC 7.2 4.0 - 11.0 thou/uL    RBC 4.20 3.80 - 5.40 mill/uL    Hemoglobin 13.2 12.0 - 16.0 g/dL    Hematocrit 38.8 35.0 - 47.0 %    MCV 92 80 - 100 fL    MCH 31.6 27.0 - 34.0 pg    MCHC 34.2 32.0 - 36.0 g/dL    RDW 11.6 11.0 - 14.5 %    Platelets 323 140 - 440 thou/uL    MPV 7.7 7.0 - 10.0 fL   Lipid Cascade   Result Value Ref Range    Cholesterol 228 (H) <=199 mg/dL    Triglycerides 114 <=149 mg/dL    HDL Cholesterol 69 >=50 mg/dL    LDL Calculated 136 (H) <=129 mg/dL    Patient Fasting > 8hrs? No       Comment:      ate banana  ate banana  ate banana   Thyroid Cascade   Result Value Ref Range    TSH 2.53 0.30 - 5.00 uIU/mL   Comprehensive Metabolic Panel   Result Value Ref Range    Sodium 138 136 - 145 mmol/L    Potassium 4.2 3.5 - 5.0 mmol/L    Chloride 102 98 - 107 mmol/L    CO2 26 22 - 31 mmol/L    Anion Gap, Calculation 10 5 - 18 mmol/L    Glucose 102 70 - 125 mg/dL    BUN 14 8 - 22 mg/dL    Creatinine 0.65 0.60 - 1.10 mg/dL    GFR MDRD Af Amer >60 >60 mL/min/1.73m2    GFR MDRD Non Af Amer >60 >60 mL/min/1.73m2    Bilirubin, Total 0.7 0.0 - 1.0 mg/dL    Calcium 9.7 8.5 - 10.5 mg/dL    Protein, Total 7.2 6.0 - 8.0 g/dL    Albumin 3.8 3.5 - 5.0 g/dL    Alkaline Phosphatase 79 45 - 120 U/L    AST 17 0 - 40 U/L    ALT <9 0 - 45 U/L    Narrative    Fasting Glucose reference range is 70-99 mg/dL per  American Diabetes Association (ADA) guidelines.       Labs are normal, with only mildly elevated total cholesterol and LDL, low fat diet and regular exercise can improve these levels, we will recheck in one year.    Please  call with questions or contact us using [employer].    Sincerely,        Electronically signed by Meg Stallworth PA-C

## 2021-06-20 NOTE — LETTER
Letter by Meg Stallworth PA-C at      Author: Meg Stallworth PA-C Service: -- Author Type: --    Filed:  Encounter Date: 3/18/2020 Status: (Other)         March 19, 2020     Patient: Selam Rene   YOB: 1960   Date of Visit: 3/18/2020       To Whom It May Concern:    It is my medical opinion that Selam Rene should remain out of work until 4/19/20. She is immune comprimised due to ongoing asthma, recent neck mass surgery, history of breast cancer and salivary gland cancer. she has also had a recent upper respiratory infection  which was not screened for COVID-19.  If you have any questions or concerns, please don't hesitate to call.    Sincerely,        Electronically signed by Meg Stallworth PA-C
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
## 2021-06-24 NOTE — TELEPHONE ENCOUNTER
Requested Prescriptions     Pending Prescriptions Disp Refills     lansoprazole (PREVACID) 15 MG capsule 30 capsule 2     Sig: Take 1 capsule (15 mg total) by mouth daily.     Patient requesting refill of Prevacid to be decreased to 15mg.  Med teed up to new dosing with refills.
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
## 2021-06-27 NOTE — PROGRESS NOTES
"Progress Notes by Maximilian Diaz PA-C at 2018  2:10 PM     Author: Maximilian Diaz PA-C Service: -- Author Type: Physician Assistant    Filed: 2018  3:47 PM Encounter Date: 2018 Status: Signed    : Maximilian Diaz PA-C (Physician Assistant)       Subjective:      Patient ID: Selam Rene is a 58 y.o. female.    Chief Complaint:    HPI  Selam Rene is a 58 y.o. female with a past medical history of pneumonia who presents today complaining of cough.  She states that she has had a one-week history of \"pneumonia\".  Patient states she was treated in August for pneumonia and is concerned that it may have returned.  She has had acute onset of dry nonproductive cough fatigue subjective fever but has not had night sweats vomiting diarrhea or skin rash or abdominal pain.  She has not tried treatment for this over-the-counter.       Past Medical History:   Diagnosis Date   ? Acute Sinusitis     Created by Conversion    ? Anxiety    ? Asthma    ? Breast cancer (H)    ? Depression    ? Migraine        Past Surgical History:   Procedure Laterality Date   ? NE  DELIVERY ONLY      Description:  Section;  Recorded: 2009;   ? NE ENLARGE BREAST      Description: Breast Surgery Enlargement Procedure Bilateral;  Recorded: 2011;  Comments: , , .   ? NE REMOVAL OF TONSILS,<11 Y/O      Description: Tonsillectomy;  Recorded: 2011;   ? NE TOTAL ABDOM HYSTERECTOMY      Description: Total Abdominal Hysterectomy;  Recorded: 2011;  Comments: with oopherectomy       No family history on file.    Social History     Tobacco Use   ? Smoking status: Never Smoker   ? Smokeless tobacco: Never Used   Substance Use Topics   ? Alcohol use: Yes     Comment: socially   ? Drug use: Not on file       Review of Systems  As above in HPI, otherwise balance of Review of Systems are negative.    Objective:     /76 (Patient Site: Right Arm, Patient Position: Sitting, Cuff Size: " Adult Regular)   Pulse 96   Temp 98.1  F (36.7  C) (Oral)   Resp 20   Wt 141 lb 9.6 oz (64.2 kg)   SpO2 94%   BMI 24.50 kg/m      Physical Exam  General: Patient is resting comfortably no acute distress is afebrile  HEENT: Head is normocephalic atraumatic   eyes are PERRL EOMI sclera anicteric   TMs are clear bilaterally  Throat is clear  No cervical lymphadenopathy present  LUNGS: with bibasilar wheezes to auscultation bilaterally  HEART: Regular rate and rhythm  Skin: Without rash non-diaphoretic      Assessment:     Procedures    The encounter diagnosis was Cough.    Plan:     1. Cough  Tublar Spacer()    doxycycline (MONODOX) 100 MG capsule         Patient Instructions   You were seen today for acute bronchitis. This is likely due to a viral illness.    Symptom management:  - Get plenty of rest  - Avoid smoking and second hand smoke  - May take tylenol or ibuprofen for fever/discomfort  - Drink plenty of non-caffeinated fluids  - Use nasal steroid spray for sinus congestion  - Albuterol inhaler may be used every 6 hours as needed for chest tightness      Reasons to be seen in the emergency room:  - Develop a fever of 100.4 or higher  - Cough changes, coughing up blood, or become short of breath  - Neck stiffness  - Chest pain  - Severe headache  - Unable to tolerate eating or drinking fluids    Otherwise, if no symptom improvement after 5 days, follow-up with your primary care provider.      Patient Education     Bronchitis, Antibiotic Treatment (Adult)    Bronchitis is an infection of the air passages (bronchial tubes) in your lungs. It often occurs when you have a cold. This illness is contagious during the first few days and is spread through the air by coughing and sneezing, or by direct contact (touching the sick person and then touching your own eyes, nose, or mouth).  Symptoms of bronchitis include cough with mucus (phlegm) and low-grade fever. Bronchitis usually lasts 7 to 14 days. Mild cases  can be treated with simple home remedies. More severe infection is treated with an antibiotic.  Home care  Follow these guidelines when caring for yourself at home:    If your symptoms are severe, rest at home for the first 2 to 3 days. When you go back to your usual activities, don't let yourself get too tired.    Do not smoke. Also avoid being exposed to secondhand smoke.    You may use over-the-counter medicines to control fever or pain, unless another medicine was prescribed. If you have chronic liver or kidney disease or have ever had a stomach ulcer or gastrointestinal bleeding, talk with your healthcare provider before using these medicines. Also talk to your provider if you are taking medicine to prevent blood clots. Aspirin should never be given to anyone younger than 18 years of age who is ill with a viral infection or fever. It may cause severe liver or brain damage.    Your appetite may be poor, so a light diet is fine. Avoid dehydration by drinking 6 to 8 glasses of fluids per day (such as water, soft drinks, sports drinks, juices, tea, or soup). Extra fluids will help loosen secretions in the nose and lungs.    Over-the-counter cough, cold, and sore-throat medicines will not shorten the length of the illness, but they may be helpful to reduce symptoms. (Note: Do not use decongestants if you have high blood pressure.)    Finish all antibiotic medicine. Do this even if you are feeling better after only a few days.  Follow-up care  Follow up with your healthcare provider, or as advised. If you had an X-ray or ECG (electrocardiogram), a specialist will review it. You will be notified of any new findings that may affect your care.  If you are age 65 or older, or if you have a chronic lung disease or condition that affects your immune system, or you smoke, ask your healthcare provider about getting a pneumococcal vaccine and a yearly flu shot (influenza vaccine).  When to seek medical advice  Call your  healthcare provider right away if any of these occur:    Fever of 100.4 F (38 C) or higher, or as directed by your healthcare provider    Coughing up increased amounts of colored sputum    Weakness, drowsiness, headache, facial pain, ear pain, or a stiff neck  Call 911  Call 911 if any of these occur.    Coughing up blood    Worsening weakness, drowsiness, headache, or stiff neck    Trouble breathing, wheezing, or pain with breathing  Date Last Reviewed: 9/13/2015 2000-2017 [employer]. [address] [place] [id number]. All rights reserved. This information is not intended as a substitute for professional medical care. Always follow your healthcare professional's instructions.
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
## 2021-06-29 NOTE — PROGRESS NOTES
Progress Notes by Ayse Basurto CNP at 2020  8:40 AM     Author: Ayse Basurto CNP Service: -- Author Type: Nurse Practitioner    Filed: 2020 11:10 AM Encounter Date: 2020 Status: Signed    : Ayse Basurto CNP (Nurse Practitioner)            [facility]  Patient Name: Selam Rene  Patient Age: 60 y.o.  YOB: 1960  MRN: [id number]    Date of Visit: 2020  Reason for Office Visit:   Chief Complaint   Patient presents with   ? Establish Care     previous pt of [facility]    ? Fmla Paperwork     for migraines    ? Headache     would like to discuss ha/s            Assessment / Plan / Medical Decision Makin. Episodic tension-type headache, not intractable  - Ambulatory referral to Neurology  Patient is provided educational material headaches.  Will have a neurology consult recommend as needed Maxalt, adequate hydration, adequate rest.  Recommend obtaining an annual eye exam.    Orders Placed This Encounter   Procedures   ? Ambulatory referral to Neurology   Followup: Return in about 6 months (around 2021). earlier if needed.    Health Maintenance Review  Health Maintenance   Topic Date Due   ? PREVENTIVE CARE VISIT  1960   ? ZOSTER VACCINES (1 of 2) 2010   ? Pneumococcal Vaccine: Pediatrics (0 to 5 Years) and At-Risk Patients (6 to 64 Years) (2 of 3 - PCV13) 2018   ? ADVANCE CARE PLANNING  2021 (Originally 1978)   ? ASTHMA ACTION PLAN  2021   ? Asthma Control Test  2021   ? LIPID  2025   ? COLORECTAL CANCER SCREENING  2025   ? TD 18+ HE  2027   ? DEPRESSION ACTION PLAN  Completed   ? INFLUENZA VACCINE RULE BASED  Completed   ? TDAP ADULT ONE TIME DOSE  Completed   ? HEPATITIS B VACCINES  Aged Out   ? HEPATITIS C SCREENING  Discontinued   ? HIV SCREENING  Discontinued   ? MAMMOGRAM  Discontinued   ? PAP SMEAR  Discontinued         I am having Selam Rene  "maintain her albuterol, lansoprazole, albuterol, and rizatriptan.      HPI:  Selam Rene is a 60 y.o. year old who presents to the office today with chronic conditions including Migraine headaches, asthma, chronic rhinitis, restless leg syndrome, history of diverticulosis, depression, multiple renal cysts, anxiety, history of gastritis, chronic reflux esophagitis, low back pain, HSV-2, rectocele, basal cell carcinoma of the face, lumbar sacral degenerative disc disease, history of partial resection of the colon, lateral epicondylitis, history of breast cancer, right knee DJD, status post Mohs surgery, hypertrophy of bone, malignant neoplasm of minor salivary gland, history of right shoulder pain      She reports a history of headaches and not been seen by Neurology for many years. She states she is having an average of 5 per month.  She states if she wakes up with a headache she states \"I have to ride it out\".  She will occasionally try sudafed if she is feeling congested.  She states the maxalt works if taken soon enough.    She underwent total hyst in 2003/2004.     Review of Systems- pertinent positive in bold:  Constitutional: Fever, chills, night sweats, fainting, weight change, fatigue, dizziness, sleeping difficulties, loud snoring/pauses in breathing  Eyes: change in vision, blurred or double vision, redness/eye pain  Ears, nose, mouth, throat: change in hearing, ear pain, hoarseness, difficulty swallowing, sores in the mouth or throat  Respiratory: shortness of breath, cough, bloody sputum, wheezing  Cardiovascular: chest pain, palpitations   Gastrointestinal: abdominal pain, heartburn/indigestion, nausea/vomiting, change in appetite, change in bowel habits, constipation or diarrhea, rectal bleeding/dark stools, difficulty swallowing  Urinary: painful urination, frequent urination, urinary urgency/incontinence, blood in urine/dark urine, nocturia (new or increasing), muscle cramps, swelling of hands, " feet, ankles, leg pain/redness  Skin: change in moles/freckles, rash, nodules  Hematologic/lymphatic: swollen lymph glands, abnormal bruising/bleeding  Endocrine: excessive thirst/urination, cold or heat intolerance  Neurologic/emotional: worrisome memory change, numbness/tingling, anxiety, mood swings      Current Scheduled Meds:  Outpatient Encounter Medications as of 2020   Medication Sig Dispense Refill   ? albuterol (PROAIR HFA;PROVENTIL HFA;VENTOLIN HFA) 90 mcg/actuation inhaler INHALE 2 PUFFS BY MOUTH EVERY 4 HOURS AS NEEDED FOR WHEEZING 8.5 g 0   ? albuterol (PROVENTIL) 2.5 mg /3 mL (0.083 %) nebulizer solution Take 3 mL (2.5 mg total) by nebulization every 6 (six) hours as needed for wheezing. 75 mL 0   ? lansoprazole (PREVACID) 30 MG capsule Take 1 capsule by mouth daily. 90 capsule 3   ? rizatriptan (MAXALT) 10 MG tablet Take 10 mg by mouth as needed for migraine. May repeat in 2 hours if needed       No facility-administered encounter medications on file as of 2020.      Past Medical History:   Diagnosis Date   ? Acute Sinusitis     Created by Conversion    ? Anxiety    ? Asthma    ? Basal cell carcinoma     nasal area, reconstruction.   ? Breast cancer (H)    ? Cyclic vomiting syndrome    ? Depression    ? Migraine      Past Surgical History:   Procedure Laterality Date   ? BLADDER SUSPENSION     ? NASAL RECONSTRUCTION      multiple mohs procedures and nasal reconstruction for BCC   ? GA  DELIVERY ONLY      Description:  Section;  Recorded: 2009;   ? GA ENLARGE BREAST      Description: Breast Surgery Enlargement Procedure Bilateral;  Recorded: 2011;  Comments: , , .   ? GA REMOVAL OF TONSILS,<11 Y/O      Description: Tonsillectomy;  Recorded: 2011;   ? GA TOTAL ABDOM HYSTERECTOMY      Description: Total Abdominal Hysterectomy;  Recorded: 2011;  Comments: with oopherectomy   ? SIMPLE MASTECTOMY  2002    bilateral, history of breast cancer  "    Social History     Tobacco Use   ? Smoking status: Never Smoker   ? Smokeless tobacco: Never Used   Substance Use Topics   ? Alcohol use: Yes     Frequency: 2-4 times a month     Drinks per session: 1 or 2     Comment: 5-6/week   ? Drug use: No     Family History   Problem Relation Age of Onset   ? COPD Mother    ? Osteoporosis Mother    ? Depression Mother    ? Anxiety disorder Mother    ? Alcohol abuse Mother    ? Lung cancer Father    ? Stroke Father    ? Alcohol abuse Father    ? Liver disease Father    ? Diabetes Daughter    ? Depression Daughter    ? Breast cancer Maternal Aunt         Unknown age of onset   ? Breast cancer Paternal Aunt         Unknown age of onset   ? Stomach cancer Maternal great-grandfather    ? Leukemia Maternal Grandmother         Unknown age of onset   ? Brain cancer Maternal cousin         Unknown age of onset   ? Rectal cancer Maternal cousin         Unknown age of onset   ? Throat cancer Paternal cousin         Unknown age of onset   ? Brain cancer Paternal cousin         Unknown age of onset     Social History     Social History Narrative   ? Not on file       Objective / Physical Examination:  Vitals:    11/11/20 0846   BP: 128/88   Pulse: 89   SpO2: 96%   Weight: 135 lb (61.2 kg)   Height: 5' 3.5\" (1.613 m)     Wt Readings from Last 3 Encounters:   11/11/20 135 lb (61.2 kg)   11/02/20 138 lb 14.4 oz (63 kg)   09/14/20 141 lb (64 kg)     Body mass index is 23.54 kg/m .     General Appearance: Alert and oriented, cooperative, affect appropriate, speech clear, in no apparent distress  Head: Normocephalic, atraumatic  Eyes:  PERRL Conjunctivae clear and sclerae non-icteric  Neck: Supple, trachea midline. No cervical adenopathy  Lungs: Clear to auscultation bilaterally. No adventitious lung sounds noted. Normal inspiratory and expiratory effort  Cardiovascular: Regular rate, normal S1, S2. No murmurs, rubs, or gallops  Extremities: Pulses 2+ and equal throughout. No edema. "   Integumentary: Warm and dry.   Neuro: Alert and oriented, follows commands appropriately.     Ct Chest With Contrast    Result Date: 10/28/2020  EXAM: CT CHEST W CONTRAST LOCATION: [facility] DATE/TIME: 10/28/2020 8:39 PM INDICATION: fu salivary gland tumor,  s/p resection in 9/19 COMPARISON: 04/01/2020. TECHNIQUE: CT chest with IV contrast. Multiplanar reformats were obtained. Dose reduction techniques were used. CONTRAST: Iohexol (Omni) 75mL FINDINGS: LUNGS AND PLEURA: No significant change or suspicious nodules. MEDIASTINUM/AXILLAE: Stable 9 x 10 mm high left posterior paratracheal node (series 3, image 26). No new or enlarging adenopathy. UPPER ABDOMEN: No significant finding. MUSCULOSKELETAL: Bilateral breast surgical changes and prosthetics. Anterior right rib deformities and sclerosis without significant change.     1.  No significant change to prior.     Mr Soft Tissue Neck With Without Contrast    Result Date: 10/29/2020  EXAM: MR NECK SOFT TISSUE ONLY W WO CONTRAST LOCATION: [facility] DATE/TIME: 10/28/2020 8:32 PM INDICATION: fu minor salivary gland tumor resected in 9/19 COMPARISON: MRI 4/1/2020. CONTRAST: Gadavist 7ml TECHNIQUE: Multiplanar multisequence neck MRI performed without and with IV contrast. FINDINGS: VISUALIZED INTRACRANIAL/ORBITS/SINUSES: No abnormality of the visualized intracranial compartment or orbits. Postoperative changes nasal cavity and paranasal sinuses with scattered mucosal thickening in the paranasal sinuses. No air-fluid level. Mastoids  clear. SUPRAHYOID NECK: Normal nasopharynx and oropharynx. Normal parapharyngeal and  spaces. Normal oral cavity and floor of mouth structures. No evidence for recurrent mass in the right glossal tonsillar sulcus at the site of original lesion. INFRAHYOID NECK: Normal supraglottic larynx, vocal cords, and hypopharynx. SALIVARY GLANDS: Stable surgical removal of the right  submandibular gland. Normal left submandibular gland. Both parotid glands unremarkable. LYMPH NODES: No new pathologic lymph nodes by size or morphology criteria. Stable small cluster of right level 2 nodes near the inferior margin of the right parotid gland from prior MRI with the largest oval nonnecrotic node measuring 8 mm in transverse diameter unchanged from prior. Stable changes from right upper neck dissection. VESSELS: Expected vascular flow voids are identified. THYROID: Normal. BONES: Normal marrow signal.     1.  Stable appearance of the MRI neck soft tissues from 4/1/2020. No localized tumor recurrence in the right glossotonsillar sulcus. 2.  No new pathologic lymphadenopathy in the neck. Stable small cluster of right level 2 nonnecrotic lymph nodes measuring up to 8 mm compared to MRI 4/1/2020. 3.  Stable changes from right submandibular gland removal and right upper neck dissection.    No results found for this or any previous visit (from the past 240 hour(s)).  Diagnostics:     Results for orders placed or performed during the hospital encounter of 10/28/20   POCT CREATININE   Result Value Ref Range    iSTAT Creatinine 0.8 0.6 - 1.1 mg/dL    iSTAT GFR MDRD Af Amer >60 >60 mL/min/1.73m2    iSTAT GFR MDRD Non Af Amer >60 >60 mL/min/1.73m2   POCT CREATININE   Result Value Ref Range    iSTAT Creatinine 0.7 0.6 - 1.1 mg/dL    iSTAT GFR MDRD Af Amer >60 >60 mL/min/1.73m2    iSTAT GFR MDRD Non Af Amer >60 >60 mL/min/1.73m2       Quality review:     PHQ-2 Total Score: 0 (3/2/2020  8:00 AM)  Depression Follow-up Plan: coping support management (3/2/2020  8:00 AM)      PHQ-9 Total Score: 1 (3/2/2020  8:00 AM)        Ayse Basurto, [facility]  Internal Medicine  [address] [place] [id number]

## 2021-07-03 NOTE — ADDENDUM NOTE
Addendum Note by Veronique Velazquez DPM at 7/24/2019  9:40 AM     Author: Veronique Velazquez DPM Service: -- Author Type: Physician    Filed: 7/24/2019 10:22 AM Encounter Date: 7/24/2019 Status: Signed    : Veronique Velazquez DPM (Physician)    Addended by: VERONIQUE VELAZQUEZ on: 7/24/2019 10:22 AM        Modules accepted: Orders

## 2021-07-19 NOTE — TELEPHONE ENCOUNTER
Left message on patient's VM with provider message.  
Question following Office Visit  When did you see your provider: 8/6/2019  What is your question: Patient called questioning what medication she will be taking prior to her upcoming MRI appointment?      Patient stated she was under the impression she would be prescribed Xanax, but when she reached out to her pharmacy she was told they did not receive and Rx for this medication.    Patient is questioning Diazepam and would like to know if this is something she needs to  from her pharmacy or will it be given to her at the appointment?    Patient stated she did not receive any of this information when she spoke to the imaging department  Okay to leave a detailed message: Yes    Patient stated if her call is returned prior to 11:00 am today she can be reached at [phone].  If it will be after 11:00 am she asks that a call is placed to [phone].    
Usually given in MRI suite but I sent one to her pharmacy now, just in case.  
What do you recommend?  
2

## 2021-09-05 ENCOUNTER — HEALTH MAINTENANCE LETTER (OUTPATIENT)
Age: 61
End: 2021-09-05

## 2022-01-01 NOTE — LETTER
[facility] ATHLETIC [facility] PHYSICAL THERAPY  [address] [place] [address]  [phone]    November 15, 2019    Re: Selam K Rene   :   1960  MRN:  [id number]   REFERRING PHYSICIAN:   Solitario Posada MD    [facility]THLETIC [facility]    Date of Initial Evaluation:  19  Visits:  Rxs Used: 1  Reason for Referral:     TMJ dysfunction  Shoulder pain, right  Aftercare following surgery of the musculoskeletal system        Physical Therapy Initial Evaluation  2019     Precautions/Restrictions/MD instructions: PT eval and treat. Shoulder ROM and mandibular opening. 6 visits    Subjective:   Date of Surgery:  Biopsy 19,   Salivary gland mass and lymph node removal on 10/23/19  MD order: 19  C/C: right sided neck tightness, pain extending from right suboccipitals to right shoulder, burning from anterior neck extending to mid chest.  Quality of pain is aching, burning and tight, throbbing, achy. Pains are described as constant in nature. Pain is worse: evening. Pain is rated 5/10.   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 Since onset, symptoms are gradually improving. Previous episodes: history of similar pain with bilateral mastectomy 17 years ago  Worsened by: lifting more than 10#, eating, chewing, talking, stress  Alleviated by: Rest.    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 She denies night pain and pain with swallowing. She denies any regional implanted devices. She denies history of other surgeries in the area.  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 Return to MD:  2 months.    Objective:    Posture:   Facial Symmetry: Poor control of right mouth opening, closing and smilng  TMJ AROM:   Movement  Joint noise Deviation   Opening 19 none none   Left Lateral deviation  10 none    Right Lateral Deviation 7 none      Cervical AROM: (Major, Moderate, Minimal or Nil loss)  Movement Loss Jose L Mod Min Nil Pain   Protrusion  x      Flexion  30 degrees       Retraction  x      Extension 15 degrees    Feels unstable   Left Rotation 40 degrees       Right Rotation 15 degrees       Left Side Bending 10 degrees       Right Side bending 15 degrees         Incision: healing well with good approximation, no redness, swelling or heat.                   Re: Selam Rene   :   1960    SHOULDER:     AROM L AROM R   Flex 170 110   Abd 180 70   Extension 50 50    80   Flx/ER C7 Unable due to pain   Ext/IR T10 Unable due to pain     Assessment/Plan:    The patient is a 59 year old female with chief complaint of significant limitation in shoulder and jaw range of motion.    The patient has the following significant findings with corresponding treatment plan.  Diagnosis 1:  Shoulder and jaw pain s/p surgery for removal of salivary gland carcinoma Pain -  hot/cold therapy, manual therapy, self management, education, directional preference exercise and home program  Decreased ROM/flexibility - manual therapy and therapeutic exercise  Decreased joint mobility - manual therapy and therapeutic exercise  Decreased strength - therapeutic exercise and therapeutic activities  Decreased proprioception - neuro re-education and therapeutic activities  Impaired muscle performance - neuro re-education  Decreased function -  therapeutic activities  Impaired posture - neuro re-education    Therapy Evaluation Codes:   1) History comprised of:   Personal factors that impact the plan of care:      Time since onset of symptoms and Work status.    Comorbidity factors that impact the plan of care are:      Asthma, Cancer and Migraines/headaches.     Medications impacting care: Anti-depressant.  2) Examination of Body Systems comprised of:   Body structures and functions that impact the plan of care:      Cervical spine, Head, Shoulder and Thoracic Spine.   Activity limitations that impact the plan of care are:      Bathing, Bending, Driving, Dressing, Grasping, Lifting,  Reading/Computer work, Sitting, Standing, Walking and Working. Eating, talking,  smiling, chewing   Clinical presentation characteristics are:    Evolving/Changing.  3) Presentation comprised of:   Presentation scored as Moderate complexity with Evolving presentation with changing characteristics..  4) Decision-Making    Moderate complexity using standardized patient assessment instrument  and/or measureable assessment of functional outcome.    Re: Selam Rene   :   1960          Cumulative Therapy Evaluation is: Moderate complexity.    Previous and current functional limitations:  (See Goal Flow Sheet for this information)    Short term and Long term goals: (See Goal Flow Sheet for this information)     Communication ability:  Patient appears to be able to clearly communicate and understand verbal and written communication and follow directions correctly.  Treatment Explanation - The following has been discussed with the patient: RX ordered/plan of care, anticipated outcomes, and possible risks and side effects.  This patient would benefit from PT intervention to resume normal activities.   Rehab potential is good.    Frequency:  2 X week, once daily  Duration:  for 1 weeks tapering to 1 X a week over 4 weeks. Will request additional visits as needed.  Discharge  Plan: Achieve all LTGs, be independent in home treatment program, and reach maximal therapeutic benefit.    Please refer to the daily flowsheet for treatment today, total treatment time and time spent performing 1:1 timed codes.         Thank you for your referral.    INQUIRIES  Therapist: Genevieve Yuan DPT   [facility] PHYSICAL THERAPY  [address] [place] [address]  Phone: [phone]  Fax: [phone]      PERRL, EOMI

## 2022-02-20 ENCOUNTER — HEALTH MAINTENANCE LETTER (OUTPATIENT)
Age: 62
End: 2022-02-20

## 2022-10-23 ENCOUNTER — HEALTH MAINTENANCE LETTER (OUTPATIENT)
Age: 62
End: 2022-10-23

## 2023-04-02 ENCOUNTER — HEALTH MAINTENANCE LETTER (OUTPATIENT)
Age: 63
End: 2023-04-02

## 2023-08-11 ENCOUNTER — HOSPITAL ENCOUNTER (EMERGENCY)
Facility: CLINIC | Age: 63
Discharge: LEFT BEFORE DISCHARGE | End: 2023-08-11

## 2024-01-01 NOTE — PROGRESS NOTES
"Date: 10/05/2020 22:29:29  Clinician: Clary Rich  Clinician NPI: [id number]  Patient: Selam Rene  Patient : 1960  Patient Address: [address], [place] [address]  Patient Phone: [phone]  Visit Protocol: URI  Patient Summary:  Selam is a 60 year old ( : 1960 ) female who initiated a [facility] Visit for COVID-19 (Coronavirus) evaluation and screening. When asked the question \"Please sign me up to receive news, health information and promotions from [facility].\", Selam responded \"No\".    Selam states her symptoms started 1-2 days ago.   Her symptoms consist of wheezing, a cough, nasal congestion, facial pain or pressure, chills, and malaise.   Symptom details     Nasal secretions: The color of her mucus is clear.    Cough: Selam coughs a few times an hour and her cough is not more bothersome at night. Phlegm comes into her throat when she coughs. She believes her cough is caused by post-nasal drip. The color of the phlegm is clear.     Wheezing: Selam has been diagnosed with asthma. Additional wheezing details as reported by the patient (free text): I have been wheezing for over a week from allergies. I have asthma       Facial pain or pressure: The facial pain or pressure does not feel worse when bending or leaning forward.      Selam denies having ear pain, headache, fever, enlarged lymph nodes, anosmia, vomiting, rhinitis, nausea, myalgias, sore throat, teeth pain, ageusia, and diarrhea. She also denies taking antibiotic medication in the past month and having recent facial or sinus surgery in the past 60 days. She is not experiencing dyspnea.   Precipitating events  She has not recently been exposed to someone with influenza. Selam has not been in close contact with any high risk individuals.   Pertinent COVID-19 (Coronavirus) information  In the past 14 days, Selam has not worked in a congregate living setting.   She either works or volunteers as a " healthcare worker or a , or works or volunteers in a healthcare facility. She does not provide direct patient care. 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 She lives with a healthcare worker.   Selam has not had a close contact with a laboratory-confirmed COVID-19 patient within 14 days of symptom onset.   Since December 2019, Selam and has not had upper respiratory infection or influenza-like illness. Has not been diagnosed with lab-confirmed COVID-19 test   Pertinent medical history  Selam typically gets a yeast infection when she takes antibiotics. She has used fluconazole (Diflucan) to treat previous yeast infections. 1 dose of fluconazole (Diflucan) has typically been sufficient for symptoms to resolve in the past.   Selam needs a return to work/school note.   Weight: 138 lbs   Selam does not smoke or use smokeless tobacco.   Weight: 138 lbs  A synchronous phone visit was initiated by the provider for the following reason: wheezing    MEDICATIONS: lansoprazole oral, ALLERGIES: Sulfa (Sulfonamide Antibiotics)  Clinician Response:  Dear Selam,    Your symptoms show that you may have coronavirus (COVID-19). This illness can cause fever, cough and trouble breathing. Many people get a mild case and get better on their own. Some people can get very sick.  Based on the symptoms you have shared, I would like you to be re-checked in 2 to 3 days. Please call your family clinic to set up a video or phone visit.  Will I be tested for COVID-19?  We would like to test you for this virus.   Please call [phone] to schedule your visit. Explain that you were referred by [facility] to have a COVID-19 test. Be ready to share your [facility] visit ID number.   The following will serve as your written order for this COVID Test,  "ordered by me, for the indication of suspected COVID [Z20.828]: The test will be ordered in [employer], our electronic health record, after you are scheduled. It will show as ordered and authorized by Trent Keene MD.  Order: COVID-19 (Coronavirus) PCR for SYMPTOMATIC testing from [facility].  1.When it's time for your COVID test:   Stay at least 6 feet away from others. (If someone will drive you to your test, stay in the backseat, as far away from the  as you can.)   Cover your mouth and nose with a mask, tissue or washcloth.  Go straight to the testing site. Don't make any stops on the way there or back.      2.Starting now: Stay home and away from others (self-isolate) until:   You've had no fever---and no medicine that reduces fever---for one full day (24 hours). And...   Your other symptoms have gotten better. For example, your cough or breathing has improved. And...   At least 10 days have passed since your symptoms started.       During this time, don't leave the house except for testing or medical care.   Stay in your own room, even for meals. Use your own bathroom if you can.   Stay away from others in your home. No hugging, kissing or shaking hands. No visitors.  Don't go to work, school or anywhere else.    Clean \"high touch\" surfaces often (doorknobs, counters, handles, etc.). Use a household cleaning spray or wipes. You'll find a full list of  on the EPA website: www.epa.gov/pesticide-registration/list-n-disinfectants-use-against-sars-cov-2.   Cover your mouth and nose with a mask, tissue or washcloth to avoid spreading germs.  Wash your hands and face often. Use soap and water.  Caregivers in these groups are at risk for severe illness due to COVID-19:  o People 65 years and older  o People who live in a nursing home or long-term care facility  o People with chronic disease (lung, heart, cancer, diabetes, kidney, liver, immunologic)   o People who have a weakened immune system, including those who: "   Are in cancer treatment  Take medicine that weakens the immune system, such as corticosteroids  Had a bone marrow or organ transplant  Have an immune deficiency  Have poorly controlled HIV or AIDS  Are obese (body mass index of 40 or higher)  Smoke regularly   o Caregivers should wear gloves while washing dishes, handling laundry and cleaning bedrooms and bathrooms.  o Use caution when washing and drying laundry: Don't shake dirty laundry, and use the warmest water setting that you can.  o For more tips, go to www.cdc.gov/coronavirus/2019-ncov/downloads/10Things.pdf.      How can I take care of myself?   Get lots of rest. Drink extra fluids (unless a doctor has told you not to)   Take Tylenol (acetaminophen) for fever or pain. If you have liver or kidney problems, ask your family doctor if it's okay to take Tylenol.   Adults can take either:    650 mg (two 325 mg pills) every 4 to 6 hours, or...   1,000 mg (two 500 mg pills) every 8 hours as needed.    Note: Don't take more than 3,000 mg in one day. Acetaminophen is found in many medicines (both prescribed and over-the-counter medicines). Read all labels to be sure you don't take too much.   For children, check the Tylenol bottle for the right dose. The dose is based on the child's age or weight.    If you have other health problems (like cancer, heart failure, an organ transplant or severe kidney disease): Call your specialty clinic if you don't feel better in the next 2 days.       Know when to call 911. Emergency warning signs include:    Trouble breathing or shortness of breath Pain or pressure in the chest that doesn't go away Feeling confused like you haven't felt before, or not being able to wake up Bluish-colored lips or face  Where can I get more information?    [facility] -- About COVID-19: [email or web address]   CDC -- What to Do If You're Sick: www.cdc.gov/coronavirus/2019-ncov/about/steps-when-sick.html   CDC -- Ending Home  Isolation: www.cdc.gov/coronavirus/2019-ncov/hcp/disposition-in-home-patients.html   CDC -- Caring for Someone: www.cdc.gov/coronavirus/2019-ncov/if-you-are-sick/care-for-someone.html   [facility] -- Interim Guidance for Hospital Discharge to Home: www.health.[facility].[place]/diseases/coronavirus/hcp/hospdischarge.pdf   [facility] clinical trials (COVID-19 research studies): clinicalaffairs.[facility]-clinical-trials    Below are the COVID-19 hotlines at the [place] Department of Health ([facility]). Interpreters are available.    For health questions: Call 985-220-2070 or 1-649.812.3764 (7 a.m. to 7 p.m.) For questions about schools and childcare: Call 804-966-9752 or 1-330.984.7627 (7 a.m. to 7 p.m.)         Diagnosis: Cough  Diagnosis ICD: R05  Triage Notes: I reviewed the patient's history, verified their identity, and explained the [facility] Visit process.    wheezing is mild and controlled by her albuterol  she states she does not need prednisone yet at this time  no chest pain no shortness of breath  she would like to observe for now and declined in person appointment  Synchronous Triage: phone, status: completed, duration: 166 seconds   13.18

## 2024-06-02 ENCOUNTER — HEALTH MAINTENANCE LETTER (OUTPATIENT)
Age: 64
End: 2024-06-02

## (undated) DEVICE — PACK EXTREMITY SOP15EXFSD

## (undated) DEVICE — DRAPE MINI C-ARM 4003

## (undated) DEVICE — PACK OCULOPLATIC SEN15OCFSD

## (undated) DEVICE — DRAPE SHEET REV FOLD 3/4 9349

## (undated) DEVICE — DRAPE STERI TOWEL LG 1010

## (undated) DEVICE — GLOVE PROTEXIS MICRO 6.5  2D73PM65

## (undated) DEVICE — LINEN TOWEL PACK X5 5464

## (undated) DEVICE — SU MERSILENE 5-0 S-24 18" 1764G

## (undated) DEVICE — GLOVE PROTEXIS W/NEU-THERA 8.5  2D73TE85

## (undated) DEVICE — SOL NACL 0.9% IRRIG 1000ML BOTTLE 2F7124

## (undated) DEVICE — BNDG ROLLER GAUZE CONFORM 3"X4YD 41-53

## (undated) DEVICE — GLOVE PROTEXIS BLUE W/NEU-THERA 8.0  2D73EB80

## (undated) DEVICE — PEN MARKING SKIN FINE 31145942

## (undated) DEVICE — ESU PENCIL W/HOLSTER E2350H

## (undated) DEVICE — ESU ELEC NDL 1" E1552

## (undated) DEVICE — SOL WATER IRRIG 1000ML BOTTLE 2F7114

## (undated) DEVICE — SU PROLENE 4-0 PC-3 18" 8634G

## (undated) DEVICE — GLOVE PROTEXIS MICRO 6.0  2D73PM60

## (undated) DEVICE — NDL ANGIOCATH 20GA 1.25" 4056

## (undated) DEVICE — SUCTION CANISTER MEDIVAC LINER 1500ML W/LID 65651-515

## (undated) DEVICE — BLADE SAW OSCIL/SAG STRK MICRO 5.5X25X0.38MM 2296-003-524

## (undated) DEVICE — SU PLAIN FAST ABSORB 6-0 PC-1 18" 1916G

## (undated) DEVICE — CAST PADDING 4" UNSTERILE 9044

## (undated) DEVICE — GLOVE PROTEXIS W/NEU-THERA 8.0  2D73TE80

## (undated) DEVICE — ESU PENCIL W/SMOKE EVAC E2515HS

## (undated) DEVICE — SU VICRYL 4-0 PS-2 18" UND J496H

## (undated) DEVICE — TUBING SUCTION 12"X1/4" N612

## (undated) RX ORDER — ERYTHROMYCIN 5 MG/G
OINTMENT OPHTHALMIC
Status: DISPENSED
Start: 2017-12-01

## (undated) RX ORDER — ONDANSETRON 2 MG/ML
INJECTION INTRAMUSCULAR; INTRAVENOUS
Status: DISPENSED
Start: 2021-02-02

## (undated) RX ORDER — FENTANYL CITRATE 50 UG/ML
INJECTION, SOLUTION INTRAMUSCULAR; INTRAVENOUS
Status: DISPENSED
Start: 2017-12-01

## (undated) RX ORDER — OXYCODONE HYDROCHLORIDE 5 MG/1
TABLET ORAL
Status: DISPENSED
Start: 2021-02-02

## (undated) RX ORDER — ACETAMINOPHEN 500 MG
TABLET ORAL
Status: DISPENSED
Start: 2017-12-01

## (undated) RX ORDER — LIDOCAINE HCL/EPINEPHRINE/PF 2%-1:200K
VIAL (ML) INJECTION
Status: DISPENSED
Start: 2017-12-01

## (undated) RX ORDER — PROPOFOL 10 MG/ML
INJECTION, EMULSION INTRAVENOUS
Status: DISPENSED
Start: 2021-02-02

## (undated) RX ORDER — DEXAMETHASONE SODIUM PHOSPHATE 4 MG/ML
INJECTION, SOLUTION INTRA-ARTICULAR; INTRALESIONAL; INTRAMUSCULAR; INTRAVENOUS; SOFT TISSUE
Status: DISPENSED
Start: 2017-12-01

## (undated) RX ORDER — ONDANSETRON 2 MG/ML
INJECTION INTRAMUSCULAR; INTRAVENOUS
Status: DISPENSED
Start: 2017-12-01

## (undated) RX ORDER — FENTANYL CITRATE 50 UG/ML
INJECTION, SOLUTION INTRAMUSCULAR; INTRAVENOUS
Status: DISPENSED
Start: 2021-02-02